# Patient Record
Sex: FEMALE | Race: WHITE | NOT HISPANIC OR LATINO | Employment: OTHER | URBAN - NONMETROPOLITAN AREA
[De-identification: names, ages, dates, MRNs, and addresses within clinical notes are randomized per-mention and may not be internally consistent; named-entity substitution may affect disease eponyms.]

---

## 2023-04-24 DIAGNOSIS — E78.2 MIXED HYPERLIPIDEMIA: Primary | ICD-10-CM

## 2023-04-24 RX ORDER — ATORVASTATIN CALCIUM 40 MG/1
TABLET, FILM COATED ORAL
Qty: 90 TABLET | Refills: 1 | Status: SHIPPED | OUTPATIENT
Start: 2023-04-24 | End: 2023-05-04 | Stop reason: SDUPTHER

## 2023-05-04 ENCOUNTER — OFFICE VISIT (OUTPATIENT)
Dept: PRIMARY CARE | Facility: CLINIC | Age: 68
End: 2023-05-04
Payer: COMMERCIAL

## 2023-05-04 VITALS
BODY MASS INDEX: 28.89 KG/M2 | DIASTOLIC BLOOD PRESSURE: 80 MMHG | HEART RATE: 52 BPM | OXYGEN SATURATION: 98 % | WEIGHT: 153 LBS | SYSTOLIC BLOOD PRESSURE: 137 MMHG | HEIGHT: 61 IN

## 2023-05-04 DIAGNOSIS — Z00.00 ROUTINE GENERAL MEDICAL EXAMINATION AT A HEALTH CARE FACILITY: Primary | ICD-10-CM

## 2023-05-04 DIAGNOSIS — Z13.220 LIPID SCREENING: ICD-10-CM

## 2023-05-04 DIAGNOSIS — Z78.0 MENOPAUSE: ICD-10-CM

## 2023-05-04 DIAGNOSIS — I73.00 RAYNAUD'S DISEASE WITHOUT GANGRENE: ICD-10-CM

## 2023-05-04 DIAGNOSIS — M17.11 ARTHRITIS OF KNEE, RIGHT: ICD-10-CM

## 2023-05-04 DIAGNOSIS — B07.0 PLANTAR WART: ICD-10-CM

## 2023-05-04 DIAGNOSIS — E78.2 MIXED HYPERLIPIDEMIA: ICD-10-CM

## 2023-05-04 DIAGNOSIS — I10 BENIGN ESSENTIAL HYPERTENSION: ICD-10-CM

## 2023-05-04 DIAGNOSIS — E03.9 HYPOTHYROIDISM, UNSPECIFIED TYPE: ICD-10-CM

## 2023-05-04 DIAGNOSIS — Z13.1 DIABETES MELLITUS SCREENING: ICD-10-CM

## 2023-05-04 DIAGNOSIS — Z13.0 SCREENING FOR DEFICIENCY ANEMIA: ICD-10-CM

## 2023-05-04 DIAGNOSIS — Z12.31 SCREENING MAMMOGRAM, ENCOUNTER FOR: ICD-10-CM

## 2023-05-04 PROBLEM — R09.89 BRUIT OF RIGHT CAROTID ARTERY: Status: ACTIVE | Noted: 2023-05-04

## 2023-05-04 PROBLEM — E78.5 HYPERLIPIDEMIA: Status: ACTIVE | Noted: 2023-05-04

## 2023-05-04 PROBLEM — E78.01 FAMILIAL HYPERCHOLESTEREMIA: Status: ACTIVE | Noted: 2023-05-04

## 2023-05-04 LAB
ALANINE AMINOTRANSFERASE (SGPT) (U/L) IN SER/PLAS: 28 U/L (ref 7–45)
ALBUMIN (G/DL) IN SER/PLAS: 4.4 G/DL (ref 3.4–5)
ALKALINE PHOSPHATASE (U/L) IN SER/PLAS: 77 U/L (ref 33–136)
ANION GAP IN SER/PLAS: 13 MMOL/L (ref 10–20)
ASPARTATE AMINOTRANSFERASE (SGOT) (U/L) IN SER/PLAS: 27 U/L (ref 9–39)
BILIRUBIN TOTAL (MG/DL) IN SER/PLAS: 0.9 MG/DL (ref 0–1.2)
CALCIUM (MG/DL) IN SER/PLAS: 9.6 MG/DL (ref 8.6–10.3)
CARBON DIOXIDE, TOTAL (MMOL/L) IN SER/PLAS: 29 MMOL/L (ref 21–32)
CHLORIDE (MMOL/L) IN SER/PLAS: 101 MMOL/L (ref 98–107)
CHOLESTEROL (MG/DL) IN SER/PLAS: 118 MG/DL (ref 0–199)
CHOLESTEROL IN HDL (MG/DL) IN SER/PLAS: 33 MG/DL
CHOLESTEROL/HDL RATIO: 3.6
CREATININE (MG/DL) IN SER/PLAS: 0.74 MG/DL (ref 0.5–1.05)
ERYTHROCYTE DISTRIBUTION WIDTH (RATIO) BY AUTOMATED COUNT: 11.9 % (ref 11.5–14.5)
ERYTHROCYTE MEAN CORPUSCULAR HEMOGLOBIN CONCENTRATION (G/DL) BY AUTOMATED: 32.7 G/DL (ref 32–36)
ERYTHROCYTE MEAN CORPUSCULAR VOLUME (FL) BY AUTOMATED COUNT: 94 FL (ref 80–100)
ERYTHROCYTES (10*6/UL) IN BLOOD BY AUTOMATED COUNT: 4.66 X10E12/L (ref 4–5.2)
GFR FEMALE: 87 ML/MIN/1.73M2
GLUCOSE (MG/DL) IN SER/PLAS: 82 MG/DL (ref 74–99)
HEMATOCRIT (%) IN BLOOD BY AUTOMATED COUNT: 43.7 % (ref 36–46)
HEMOGLOBIN (G/DL) IN BLOOD: 14.3 G/DL (ref 12–16)
LDL: 74 MG/DL (ref 0–99)
LEUKOCYTES (10*3/UL) IN BLOOD BY AUTOMATED COUNT: 6.4 X10E9/L (ref 4.4–11.3)
PLATELETS (10*3/UL) IN BLOOD AUTOMATED COUNT: 241 X10E9/L (ref 150–450)
POTASSIUM (MMOL/L) IN SER/PLAS: 4.6 MMOL/L (ref 3.5–5.3)
PROTEIN TOTAL: 6.7 G/DL (ref 6.4–8.2)
SODIUM (MMOL/L) IN SER/PLAS: 138 MMOL/L (ref 136–145)
TRIGLYCERIDE (MG/DL) IN SER/PLAS: 57 MG/DL (ref 0–149)
UREA NITROGEN (MG/DL) IN SER/PLAS: 17 MG/DL (ref 6–23)
VLDL: 11 MG/DL (ref 0–40)

## 2023-05-04 PROCEDURE — 85027 COMPLETE CBC AUTOMATED: CPT

## 2023-05-04 PROCEDURE — 86235 NUCLEAR ANTIGEN ANTIBODY: CPT

## 2023-05-04 PROCEDURE — 3075F SYST BP GE 130 - 139MM HG: CPT | Performed by: FAMILY MEDICINE

## 2023-05-04 PROCEDURE — 86225 DNA ANTIBODY NATIVE: CPT

## 2023-05-04 PROCEDURE — 99397 PER PM REEVAL EST PAT 65+ YR: CPT | Performed by: FAMILY MEDICINE

## 2023-05-04 PROCEDURE — 3079F DIAST BP 80-89 MM HG: CPT | Performed by: FAMILY MEDICINE

## 2023-05-04 PROCEDURE — 80061 LIPID PANEL: CPT

## 2023-05-04 PROCEDURE — 86039 ANTINUCLEAR ANTIBODIES (ANA): CPT

## 2023-05-04 PROCEDURE — 86038 ANTINUCLEAR ANTIBODIES: CPT

## 2023-05-04 PROCEDURE — 80053 COMPREHEN METABOLIC PANEL: CPT

## 2023-05-04 PROCEDURE — 1159F MED LIST DOCD IN RCRD: CPT | Performed by: FAMILY MEDICINE

## 2023-05-04 RX ORDER — ATORVASTATIN CALCIUM 40 MG/1
40 TABLET, FILM COATED ORAL DAILY
Qty: 90 TABLET | Refills: 3 | Status: SHIPPED | OUTPATIENT
Start: 2023-05-04 | End: 2024-01-15 | Stop reason: SDUPTHER

## 2023-05-04 RX ORDER — LEVOTHYROXINE SODIUM 100 UG/1
100 TABLET ORAL DAILY
Qty: 90 TABLET | Refills: 3 | Status: SHIPPED | OUTPATIENT
Start: 2023-05-04 | End: 2023-10-02 | Stop reason: SDUPTHER

## 2023-05-04 RX ORDER — ACETAMINOPHEN 500 MG
1 TABLET ORAL DAILY
COMMUNITY

## 2023-05-04 RX ORDER — LOSARTAN POTASSIUM 25 MG/1
25 TABLET ORAL DAILY
Qty: 90 TABLET | Refills: 2 | Status: SHIPPED | OUTPATIENT
Start: 2023-05-04 | End: 2023-10-02 | Stop reason: SDUPTHER

## 2023-05-04 RX ORDER — HYDROCHLOROTHIAZIDE 12.5 MG/1
1 TABLET ORAL DAILY PRN
COMMUNITY
Start: 2017-07-31 | End: 2023-05-04 | Stop reason: SDUPTHER

## 2023-05-04 RX ORDER — MELOXICAM 7.5 MG/1
7.5 TABLET ORAL DAILY
Qty: 90 TABLET | Refills: 3 | Status: SHIPPED | OUTPATIENT
Start: 2023-05-04 | End: 2024-01-17 | Stop reason: SDUPTHER

## 2023-05-04 RX ORDER — ASPIRIN 81 MG/1
1 TABLET ORAL DAILY
COMMUNITY
Start: 2016-10-27

## 2023-05-04 RX ORDER — HYDROCHLOROTHIAZIDE 12.5 MG/1
12.5 TABLET ORAL DAILY
Qty: 90 TABLET | Refills: 2 | Status: SHIPPED | OUTPATIENT
Start: 2023-05-04 | End: 2024-01-15 | Stop reason: SDUPTHER

## 2023-05-04 RX ORDER — MELOXICAM 15 MG/1
1 TABLET ORAL DAILY
COMMUNITY
Start: 2016-07-19 | End: 2023-05-04

## 2023-05-04 RX ORDER — IMIQUIMOD 12.5 MG/.25G
CREAM TOPICAL
Qty: 24 PACKET | Refills: 2 | Status: SHIPPED | OUTPATIENT
Start: 2023-05-04 | End: 2023-09-12 | Stop reason: ALTCHOICE

## 2023-05-04 RX ORDER — LOSARTAN POTASSIUM 25 MG/1
1 TABLET ORAL DAILY
COMMUNITY
Start: 2017-02-27 | End: 2023-05-04 | Stop reason: SDUPTHER

## 2023-05-04 ASSESSMENT — ENCOUNTER SYMPTOMS
COLOR CHANGE: 0
BLOOD IN STOOL: 0
HEMATURIA: 0
DIFFICULTY URINATING: 0
UNEXPECTED WEIGHT CHANGE: 0
SHORTNESS OF BREATH: 0
APPETITE CHANGE: 0
TROUBLE SWALLOWING: 0
CONSTIPATION: 0
CONFUSION: 0
PALPITATIONS: 0
JOINT SWELLING: 0
NERVOUS/ANXIOUS: 0
SEIZURES: 0
FEVER: 0
DIARRHEA: 0

## 2023-05-04 ASSESSMENT — PATIENT HEALTH QUESTIONNAIRE - PHQ9
SUM OF ALL RESPONSES TO PHQ9 QUESTIONS 1 AND 2: 0
SUM OF ALL RESPONSES TO PHQ9 QUESTIONS 1 AND 2: 0
1. LITTLE INTEREST OR PLEASURE IN DOING THINGS: NOT AT ALL
2. FEELING DOWN, DEPRESSED OR HOPELESS: NOT AT ALL
2. FEELING DOWN, DEPRESSED OR HOPELESS: NOT AT ALL
1. LITTLE INTEREST OR PLEASURE IN DOING THINGS: NOT AT ALL

## 2023-05-04 NOTE — PATIENT INSTRUCTIONS
1.stop losartan and start amlopidine to help w/ BP and raynauds  2. Using cream at night and wash off in AM. Every other night

## 2023-05-04 NOTE — PROGRESS NOTES
"Subjective   Patient ID: Verenice Sotelo is a 68 y.o. female who presents for Annual Exam (Yearly physical, refills ).  Having raynauds syndrome- in cold hands and now feet are changing to white/blue/red  Some pain   Hx of blue toe like changes    Having lesion on right foot            Review of Systems   Constitutional:  Negative for appetite change, fever and unexpected weight change.   HENT:  Negative for congestion and trouble swallowing.    Eyes:  Negative for visual disturbance.   Respiratory:  Negative for shortness of breath.    Cardiovascular:  Negative for chest pain, palpitations and leg swelling.   Gastrointestinal:  Negative for blood in stool, constipation and diarrhea.   Genitourinary:  Negative for difficulty urinating and hematuria.   Musculoskeletal:  Negative for gait problem and joint swelling.   Skin:  Negative for color change.   Allergic/Immunologic: Negative for immunocompromised state.   Neurological:  Negative for seizures and syncope.   Psychiatric/Behavioral:  Negative for confusion and suicidal ideas. The patient is not nervous/anxious.        Objective   /80   Pulse 52   Ht 1.549 m (5' 1\")   Wt 69.4 kg (153 lb)   SpO2 98%   BMI 28.91 kg/m²     Physical Exam  Constitutional:       General: She is not in acute distress.     Appearance: Normal appearance. She is not ill-appearing.   HENT:      Head: Normocephalic and atraumatic.      Right Ear: Tympanic membrane normal.      Left Ear: Tympanic membrane normal.      Nose: Nose normal.      Mouth/Throat:      Mouth: Mucous membranes are moist.   Eyes:      Pupils: Pupils are equal, round, and reactive to light.   Neck:      Vascular: Carotid bruit (right) present.   Cardiovascular:      Rate and Rhythm: Normal rate and regular rhythm.      Heart sounds: No murmur heard.     No friction rub. No gallop.   Pulmonary:      Effort: Pulmonary effort is normal.      Breath sounds: Normal breath sounds.   Abdominal:      General: " Abdomen is flat. There is no distension.      Palpations: Abdomen is soft.      Tenderness: There is no abdominal tenderness. There is no guarding.      Hernia: No hernia is present.   Musculoskeletal:         General: Normal range of motion.   Skin:     General: Skin is warm and dry.      Comments: Right foot plantar wart   Neurological:      General: No focal deficit present.      Mental Status: She is alert. Mental status is at baseline.      Cranial Nerves: No cranial nerve deficit.      Motor: No weakness.      Gait: Gait normal.   Psychiatric:         Mood and Affect: Mood normal.         Behavior: Behavior normal.         Thought Content: Thought content normal.         Judgment: Judgment normal.         Assessment/Plan   Problem List Items Addressed This Visit    None    Assessment:  #Hypothyroidism    #Hyperlipidemia  Lipitor 40mg     #Hypertension  Change losartan to amlodipine to help w/ raynauds in the fall  Continue hydrochlorothiazide    #raynauds:  Check lori  Norvasc in the fall     #Left knee pain: Secondary to arthritis with concern for a possible lateral meniscus tear    #Right carotid bruit: Negative carotid ultrasound    #Osteopenia    #Diverticulosis on colonoscopy     #obesity:  Lost significant wt from WW  We discussed 135lb is good BMI          #hypothyroid:        Levo 100mcg    Health Maintenance Reminder:  -Blood Work: today  -Hep C: complete  -Mammogram: ordered  -Colonoscopy: 10/31  -Pap: 5/23  -DEXA (FRAX): ordered  -Shringix : completed   -Pneumovax: complete  -Flu: Yearly

## 2023-05-05 LAB
ANA PATTERN: ABNORMAL
ANA TITER: ABNORMAL
ANTI-NUCLEAR ANTIBODY (ANA): POSITIVE

## 2023-05-09 LAB
ANTI-CENTROMERE: <0.2 AI
ANTI-CHROMATIN: <0.2 AI
ANTI-DNA (DS): 4 IU/ML
ANTI-JO-1 IGG: <0.2 AI
ANTI-RIBOSOMAL P: <0.2 AI
ANTI-RNP: 0.2 AI
ANTI-SCL-70: <0.2 AI
ANTI-SM/RNP: <0.2 AI
ANTI-SM: <0.2 AI
ANTI-SSA: <0.2 AI
ANTI-SSB: <0.2 AI

## 2023-06-15 DIAGNOSIS — M17.11 ARTHRITIS OF KNEE, RIGHT: Primary | ICD-10-CM

## 2023-06-15 RX ORDER — MELOXICAM 15 MG/1
TABLET ORAL
Qty: 90 TABLET | Refills: 3 | Status: SHIPPED | OUTPATIENT
Start: 2023-06-15 | End: 2023-09-12 | Stop reason: ALTCHOICE

## 2023-09-12 ENCOUNTER — OFFICE VISIT (OUTPATIENT)
Dept: PRIMARY CARE | Facility: CLINIC | Age: 68
End: 2023-09-12
Payer: MEDICARE

## 2023-09-12 VITALS
SYSTOLIC BLOOD PRESSURE: 138 MMHG | WEIGHT: 146 LBS | OXYGEN SATURATION: 99 % | BODY MASS INDEX: 26.87 KG/M2 | DIASTOLIC BLOOD PRESSURE: 60 MMHG | HEART RATE: 58 BPM | HEIGHT: 62 IN

## 2023-09-12 DIAGNOSIS — N81.4 CYSTOCELE WITH PROLAPSE: Primary | ICD-10-CM

## 2023-09-12 PROCEDURE — 3078F DIAST BP <80 MM HG: CPT

## 2023-09-12 PROCEDURE — 1160F RVW MEDS BY RX/DR IN RCRD: CPT

## 2023-09-12 PROCEDURE — 1159F MED LIST DOCD IN RCRD: CPT

## 2023-09-12 PROCEDURE — 1036F TOBACCO NON-USER: CPT

## 2023-09-12 PROCEDURE — 3075F SYST BP GE 130 - 139MM HG: CPT

## 2023-09-12 PROCEDURE — 99213 OFFICE O/P EST LOW 20 MIN: CPT

## 2023-09-12 NOTE — PROGRESS NOTES
Subjective   Patient ID: Verenice Sotelo is a 68 y.o. female who presents for Vaginal Prolapse (She feels something in her vaginal area, comes and goes no pain has more of a frequency to urinate and can't hold it ).  HPI  Verenice presents for a bulge that she has noticed in her vaginal opening.   She says the bulge is not there all the time but when she has to strain or to go to the bathroom she notices it.   This morning she did not have it but then she exercised and she noticed it again after exercising.   She also has sudden urges to urinate now. And then she goes and feels like she has to go again. Sometimes gets a lot of urine out sometimes not.  Lost weight since November.  Does not itch or hurt.  No abnormal vaginal discharge   No vaginal bleeding.  History of 3 vaginal deliveries.  Wakes up in the morning and the bulge is gone.  Once it protudes it stays all day.    Past Surgical History:   Procedure Laterality Date    CATARACT EXTRACTION  06/11/2015    Cataract Surgery    TUBAL LIGATION  10/18/2013    Tubal Ligation      Past Medical History:   Diagnosis Date    Acute maxillary sinusitis, unspecified 10/26/2016    Acute maxillary sinusitis    Encounter for immunization 01/09/2020    Need for shingles vaccine    Encounter for immunization 09/19/2016    Need for shingles vaccine    Encounter for immunization 09/19/2016    Needs flu shot    Encounter for screening for other viral diseases 12/01/2017    Need for hepatitis C screening test    Personal history of other specified conditions 04/23/2015    History of diarrhea     Social History     Tobacco Use    Smoking status: Never    Smokeless tobacco: Never   Substance Use Topics    Alcohol use: Never    Drug use: Never        Review of Systems  10 point review of systems performed and is negative except as noted in the HPI.      Current Outpatient Medications:     aspirin 81 mg EC tablet, Take 1 tablet (81 mg) by mouth once daily., Disp: , Rfl:      "atorvastatin (Lipitor) 40 mg tablet, Take 1 tablet (40 mg) by mouth once daily., Disp: 90 tablet, Rfl: 3    calcium carbonate-vitamin D3 600 mg-20 mcg (800 unit) tablet, Take 1 tablet by mouth once daily., Disp: , Rfl:     hydroCHLOROthiazide (HYDRODiuril) 12.5 mg tablet, Take 1 tablet (12.5 mg) by mouth once daily., Disp: 90 tablet, Rfl: 2    levothyroxine (Synthroid, Levoxyl) 100 mcg tablet, Take 1 tablet (100 mcg) by mouth once daily., Disp: 90 tablet, Rfl: 3    losartan (Cozaar) 25 mg tablet, Take 1 tablet (25 mg) by mouth once daily., Disp: 90 tablet, Rfl: 2    meloxicam (Mobic) 7.5 mg tablet, Take 1 tablet (7.5 mg) by mouth once daily., Disp: 90 tablet, Rfl: 3     Objective   /60   Pulse 58   Ht 1.575 m (5' 2\")   Wt 66.2 kg (146 lb)   SpO2 99%   BMI 26.70 kg/m²     Physical Exam  Vitals reviewed. Chaperone present: declined - see form.   Constitutional:       General: She is not in acute distress.     Appearance: Normal appearance. She is obese. She is not ill-appearing.   HENT:      Head: Normocephalic and atraumatic.   Genitourinary:     Exam position: Lithotomy position.      Labia:         Right: No rash or tenderness.         Left: No rash or tenderness.       Urethra: Prolapse present.      Vagina: No foreign body. Prolapsed vaginal walls (anterior bulge seen through in the introitus) present. No vaginal discharge, erythema, tenderness or bleeding.      Cervix: Normal.      Rectum: Normal.   Musculoskeletal:         General: Normal range of motion.   Skin:     General: Skin is warm and dry.   Neurological:      Mental Status: She is alert and oriented to person, place, and time.       Assessment/Plan   Problem List Items Addressed This Visit    None  Visit Diagnoses       Cystocele with prolapse    -  Primary    Relevant Orders    Referral to Obstetrics / Gynecology        Referral to Dr. Johnson for further evaluation of potential cystocele   Follow up as needed    Discussed at visit any " disease processes that were of concern as well as the risks, benefits and instructions on any new medication provided. Patient (and/or caretaker of patient if present) stated all questions were answered, and they voiced understanding of instructions.

## 2023-10-02 DIAGNOSIS — I10 BENIGN ESSENTIAL HYPERTENSION: ICD-10-CM

## 2023-10-02 DIAGNOSIS — E03.9 HYPOTHYROIDISM, UNSPECIFIED TYPE: ICD-10-CM

## 2023-10-02 RX ORDER — LOSARTAN POTASSIUM 25 MG/1
25 TABLET ORAL DAILY
Qty: 90 TABLET | Refills: 2 | Status: SHIPPED | OUTPATIENT
Start: 2023-10-02 | End: 2024-01-18 | Stop reason: SDUPTHER

## 2023-10-02 RX ORDER — LEVOTHYROXINE SODIUM 100 UG/1
100 TABLET ORAL DAILY
Qty: 90 TABLET | Refills: 3 | Status: SHIPPED | OUTPATIENT
Start: 2023-10-02 | End: 2024-01-15 | Stop reason: SDUPTHER

## 2023-11-13 ENCOUNTER — OFFICE VISIT (OUTPATIENT)
Dept: UROLOGY | Facility: CLINIC | Age: 68
End: 2023-11-13
Payer: MEDICARE

## 2023-11-13 DIAGNOSIS — N81.11 MIDLINE CYSTOCELE: Primary | ICD-10-CM

## 2023-11-13 LAB
POC APPEARANCE, URINE: CLEAR
POC BILIRUBIN, URINE: NEGATIVE
POC BLOOD, URINE: NEGATIVE
POC COLOR, URINE: YELLOW
POC GLUCOSE, URINE: NEGATIVE MG/DL
POC KETONES, URINE: NEGATIVE MG/DL
POC LEUKOCYTES, URINE: ABNORMAL
POC NITRITE,URINE: NEGATIVE
POC PH, URINE: 7 PH
POC PROTEIN, URINE: NEGATIVE MG/DL
POC SPECIFIC GRAVITY, URINE: 1.01
POC UROBILINOGEN, URINE: 0.2 EU/DL

## 2023-11-13 PROCEDURE — 3078F DIAST BP <80 MM HG: CPT | Performed by: STUDENT IN AN ORGANIZED HEALTH CARE EDUCATION/TRAINING PROGRAM

## 2023-11-13 PROCEDURE — 3075F SYST BP GE 130 - 139MM HG: CPT | Performed by: STUDENT IN AN ORGANIZED HEALTH CARE EDUCATION/TRAINING PROGRAM

## 2023-11-13 PROCEDURE — 1159F MED LIST DOCD IN RCRD: CPT | Performed by: STUDENT IN AN ORGANIZED HEALTH CARE EDUCATION/TRAINING PROGRAM

## 2023-11-13 PROCEDURE — 57160 INSERT PESSARY/OTHER DEVICE: CPT | Performed by: STUDENT IN AN ORGANIZED HEALTH CARE EDUCATION/TRAINING PROGRAM

## 2023-11-13 PROCEDURE — 1160F RVW MEDS BY RX/DR IN RCRD: CPT | Performed by: STUDENT IN AN ORGANIZED HEALTH CARE EDUCATION/TRAINING PROGRAM

## 2023-11-13 PROCEDURE — A4562 PESSARY, NON RUBBER,ANY TYPE: HCPCS | Performed by: STUDENT IN AN ORGANIZED HEALTH CARE EDUCATION/TRAINING PROGRAM

## 2023-11-13 PROCEDURE — 99205 OFFICE O/P NEW HI 60 MIN: CPT | Performed by: STUDENT IN AN ORGANIZED HEALTH CARE EDUCATION/TRAINING PROGRAM

## 2023-11-13 PROCEDURE — 1036F TOBACCO NON-USER: CPT | Performed by: STUDENT IN AN ORGANIZED HEALTH CARE EDUCATION/TRAINING PROGRAM

## 2023-11-13 RX ORDER — CELECOXIB 50 MG/1
400 CAPSULE ORAL ONCE
Status: CANCELLED | OUTPATIENT
Start: 2023-11-13 | End: 2023-11-13

## 2023-11-13 RX ORDER — GABAPENTIN 300 MG/1
600 CAPSULE ORAL ONCE
Status: CANCELLED | OUTPATIENT
Start: 2023-11-13 | End: 2023-11-13

## 2023-11-13 RX ORDER — ACETAMINOPHEN 325 MG/1
975 TABLET ORAL ONCE
Status: CANCELLED | OUTPATIENT
Start: 2023-11-13 | End: 2023-11-13

## 2023-11-13 NOTE — LETTER
November 13, 2023     Syeda King PA-C  [address] [facility] [id number]    Patient: Verenice Sotelo   YOB: 1955   Date of Visit: 11/13/2023       Dear Dr. Syeda King PA-C:    Thank you for referring Verenice Sotelo to me for evaluation. Below are my notes for this consultation.  If you have questions, please do not hesitate to call me. I look forward to following your patient along with you.       Sincerely,     Ruben Johnson MD      CC: No Recipients  ______________________________________________________________________________________        PCP  Fermin Bunch DO         CHIEF COMPLAINT: Pelvic organ prolapse         HISTORY OF PRESENT ILLNESS:  This is a  68 y.o. y.o. who presents with sensation of a vaginal bulge and discomfort.  Patient feels the bulge especially when she is most active and at the end of the day.  Also reports worsening urinary urgency frequency and nocturia.  She has had stress incontinence for a long time.  She has had 3 vaginal births.  Her bowel movements are relatively normal.  Previous surgery include a laparoscopic tubal ligation.  She is otherwise in generally good health         Past Medical History  She has a past medical history of Acute maxillary sinusitis, unspecified (10/26/2016), Encounter for immunization (01/09/2020), Encounter for immunization (09/19/2016), Encounter for immunization (09/19/2016), Encounter for screening for other viral diseases (12/01/2017), and Personal history of other specified conditions (04/23/2015).    Surgical History  She has a past surgical history that includes Tubal ligation (10/18/2013) and Cataract extraction (06/11/2015).     Social History  She reports that she has never smoked. She has never used smokeless tobacco. She reports that she does not drink alcohol and does not use drugs.    Family History  Family History   Problem Relation Name Age of Onset   • Cervical cancer Mother     • Prostate cancer Father    "  • Aneurysm Sister          Ruptured Cerebral   • Hyperlipidemia Brother          Allergies  Lisinopril and Sulfa (sulfonamide antibiotics)        A comprehensive 10+ review of systems was negative except for: see hpi                    PHYSICAL EXAMINATION:  BP Readings from Last 3 Encounters:   09/12/23 138/60   05/04/23 137/80   09/02/22 124/60      Wt Readings from Last 3 Encounters:   09/12/23 66.2 kg (146 lb)   05/04/23 69.4 kg (153 lb)   09/02/22 84 kg (185 lb 4 oz)      BMI: Estimated body mass index is 26.7 kg/m² as calculated from the following:    Height as of 9/12/23: 1.575 m (5' 2\").    Weight as of 9/12/23: 66.2 kg (146 lb).  BSA: Estimated body surface area is 1.7 meters squared as calculated from the following:    Height as of 9/12/23: 1.575 m (5' 2\").    Weight as of 9/12/23: 66.2 kg (146 lb).  HEENT: Normocephalic, atraumatic, PER EOMI, nonicteric, trachea normal, thyroid normal, oropharynx normal.  CARDIAC: regular rate & rhythm, S1 & S2 normal.  No heaves, thrills, gallops or murmurs.  LUNGS: Clear to auscultation, no spinal or CV tenderness.  EXTREMITIES: No evidence of cyanosis, clubbing or edema.      Pelvic:  Genitourinary:  normal external genitalia, Bartholin's glands negative, [place]'s glands negative  Urethra   normal meatus, non-tender, no periurethral mass  Vaginal mucosa  normal  Cervix  normal  Uterus  normal size, nontender  Adnexae  negative nontender, no masses  Atrophy positive    CST negative  Pelvic floor muscle contraction  5/5    POP-Q (in supine position):        Aa 1     Ba 1     C -3              gh 4     pb 3     tvl 8              Ap -3     Bp -3     D -3    Rectal: no hemorrhoids, fissures or masses    PVR (by Ultrasound): 18           IMPRESSION AND PLAN:  Verenice Sotelo is a 68 y.o. who presents with stage II uterovaginal prolapse and mixed incontinence    POP:  I discussed treatment options including pessary and surgery, with regard to surgery discussed " hysterectomy vs. Hysteropexy: major benefit of hysteropexy is shorter OR time and less EBL and outcomes equivalent to hysterectomy + repair at 3 years, but no data beyond that time point. Also discussed SCP vs native tissue repair; for SCP the failure rate is 5-10%, but associated with mesh complications including erosion <1% and SBO <0.5% vs native tissue repair which is associated with 20-30% failure rate, but no long term risk of complications and only~15% requiring additional treatment.    She was fitted with a #3 ring with support today, she will also be scheduled for a laparoscopic supracervical hysterectomy and sacrocolpopexy, she was given handouts about all options and will let me know what she wants to do ultimately    She is interested in hearing more about the premier trial    We will follow-up after UDS      MADIE    -discussed mechanism of UUI and FRANCIS, and treatment options for both including PFT, pessary, sling for FRANCIS and PFT, pharmacotherapy and third-line therapy for OAB    Follow-up after UDS    11/13/2023

## 2023-11-13 NOTE — PROGRESS NOTES
PCP  Fermin Bunch DO         CHIEF COMPLAINT: Pelvic organ prolapse         HISTORY OF PRESENT ILLNESS:  This is a  68 y.o. y.o. who presents with sensation of a vaginal bulge and discomfort.  Patient feels the bulge especially when she is most active and at the end of the day.  Also reports worsening urinary urgency frequency and nocturia.  She has had stress incontinence for a long time.  She has had 3 vaginal births.  Her bowel movements are relatively normal.  Previous surgery include a laparoscopic tubal ligation.  She is otherwise in generally good health         Past Medical History  She has a past medical history of Acute maxillary sinusitis, unspecified (10/26/2016), Encounter for immunization (01/09/2020), Encounter for immunization (09/19/2016), Encounter for immunization (09/19/2016), Encounter for screening for other viral diseases (12/01/2017), and Personal history of other specified conditions (04/23/2015).    Surgical History  She has a past surgical history that includes Tubal ligation (10/18/2013) and Cataract extraction (06/11/2015).     Social History  She reports that she has never smoked. She has never used smokeless tobacco. She reports that she does not drink alcohol and does not use drugs.    Family History  Family History   Problem Relation Name Age of Onset    Cervical cancer Mother      Prostate cancer Father      Aneurysm Sister          Ruptured Cerebral    Hyperlipidemia Brother          Allergies  Lisinopril and Sulfa (sulfonamide antibiotics)        A comprehensive 10+ review of systems was negative except for: see hpi                    PHYSICAL EXAMINATION:  BP Readings from Last 3 Encounters:   09/12/23 138/60   05/04/23 137/80   09/02/22 124/60      Wt Readings from Last 3 Encounters:   09/12/23 66.2 kg (146 lb)   05/04/23 69.4 kg (153 lb)   09/02/22 84 kg (185 lb 4 oz)      BMI: Estimated body mass index is 26.7 kg/m² as calculated from the following:    Height as of  "9/12/23: 1.575 m (5' 2\").    Weight as of 9/12/23: 66.2 kg (146 lb).  BSA: Estimated body surface area is 1.7 meters squared as calculated from the following:    Height as of 9/12/23: 1.575 m (5' 2\").    Weight as of 9/12/23: 66.2 kg (146 lb).  HEENT: Normocephalic, atraumatic, PER EOMI, nonicteric, trachea normal, thyroid normal, oropharynx normal.  CARDIAC: regular rate & rhythm, S1 & S2 normal.  No heaves, thrills, gallops or murmurs.  LUNGS: Clear to auscultation, no spinal or CV tenderness.  EXTREMITIES: No evidence of cyanosis, clubbing or edema.      Pelvic:  Genitourinary:  normal external genitalia, Bartholin's glands negative, [place]'s glands negative  Urethra   normal meatus, non-tender, no periurethral mass  Vaginal mucosa  normal  Cervix  normal  Uterus  normal size, nontender  Adnexae  negative nontender, no masses  Atrophy positive    CST negative  Pelvic floor muscle contraction  5/5    POP-Q (in supine position):        Aa 1     Ba 1     C -3              gh 4     pb 3     tvl 8              Ap -3     Bp -3     D -3    Rectal: no hemorrhoids, fissures or masses    PVR (by Ultrasound): 18           IMPRESSION AND PLAN:  Verenice Sotelo is a 68 y.o. who presents with stage II uterovaginal prolapse and mixed incontinence    POP:  I discussed treatment options including pessary and surgery, with regard to surgery discussed hysterectomy vs. Hysteropexy: major benefit of hysteropexy is shorter OR time and less EBL and outcomes equivalent to hysterectomy + repair at 3 years, but no data beyond that time point. Also discussed SCP vs native tissue repair; for SCP the failure rate is 5-10%, but associated with mesh complications including erosion <1% and SBO <0.5% vs native tissue repair which is associated with 20-30% failure rate, but no long term risk of complications and only~15% requiring additional treatment.    She was fitted with a #3 ring with support today, she will also be scheduled for a " laparoscopic supracervical hysterectomy and sacrocolpopexy, she was given handouts about all options and will let me know what she wants to do ultimately    She is interested in hearing more about the premier trial    We will follow-up after UDS      MADIE    -discussed mechanism of UUI and FRANCIS, and treatment options for both including PFT, pessary, sling for FRANCIS and PFT, pharmacotherapy and third-line therapy for OAB    Follow-up after UDS    11/13/2023

## 2023-11-16 ENCOUNTER — APPOINTMENT (OUTPATIENT)
Dept: UROLOGY | Facility: CLINIC | Age: 68
End: 2023-11-16
Payer: MEDICARE

## 2023-11-28 ENCOUNTER — HOSPITAL ENCOUNTER (OUTPATIENT)
Facility: HOSPITAL | Age: 68
Setting detail: OUTPATIENT SURGERY
End: 2023-11-28
Attending: STUDENT IN AN ORGANIZED HEALTH CARE EDUCATION/TRAINING PROGRAM | Admitting: STUDENT IN AN ORGANIZED HEALTH CARE EDUCATION/TRAINING PROGRAM
Payer: COMMERCIAL

## 2023-11-28 PROBLEM — N81.11 MIDLINE CYSTOCELE: Status: ACTIVE | Noted: 2023-11-13

## 2023-12-04 ENCOUNTER — APPOINTMENT (OUTPATIENT)
Dept: UROLOGY | Facility: CLINIC | Age: 68
End: 2023-12-04
Payer: MEDICARE

## 2023-12-07 ENCOUNTER — APPOINTMENT (OUTPATIENT)
Dept: UROLOGY | Facility: CLINIC | Age: 68
End: 2023-12-07
Payer: MEDICARE

## 2024-01-10 ENCOUNTER — APPOINTMENT (OUTPATIENT)
Dept: UROLOGY | Facility: CLINIC | Age: 69
End: 2024-01-10
Payer: COMMERCIAL

## 2024-01-15 DIAGNOSIS — E03.9 HYPOTHYROIDISM, UNSPECIFIED TYPE: ICD-10-CM

## 2024-01-15 DIAGNOSIS — E78.2 MIXED HYPERLIPIDEMIA: ICD-10-CM

## 2024-01-15 DIAGNOSIS — I10 BENIGN ESSENTIAL HYPERTENSION: ICD-10-CM

## 2024-01-15 RX ORDER — LOSARTAN POTASSIUM 25 MG/1
25 TABLET ORAL DAILY
Qty: 30 TABLET | Refills: 0 | Status: CANCELLED | OUTPATIENT
Start: 2024-01-15

## 2024-01-16 ENCOUNTER — APPOINTMENT (OUTPATIENT)
Dept: UROLOGY | Facility: CLINIC | Age: 69
End: 2024-01-16
Payer: COMMERCIAL

## 2024-01-17 DIAGNOSIS — M17.11 ARTHRITIS OF KNEE, RIGHT: ICD-10-CM

## 2024-01-17 RX ORDER — MELOXICAM 7.5 MG/1
7.5 TABLET ORAL DAILY
Qty: 90 TABLET | Refills: 3 | Status: SHIPPED | OUTPATIENT
Start: 2024-01-17 | End: 2024-04-15 | Stop reason: SDUPTHER

## 2024-01-18 DIAGNOSIS — E78.2 MIXED HYPERLIPIDEMIA: ICD-10-CM

## 2024-01-18 DIAGNOSIS — E03.9 HYPOTHYROIDISM, UNSPECIFIED TYPE: ICD-10-CM

## 2024-01-18 DIAGNOSIS — I10 BENIGN ESSENTIAL HYPERTENSION: ICD-10-CM

## 2024-01-18 RX ORDER — ATORVASTATIN CALCIUM 40 MG/1
40 TABLET, FILM COATED ORAL DAILY
Qty: 90 TABLET | Refills: 3 | Status: SHIPPED | OUTPATIENT
Start: 2024-01-18 | End: 2024-04-15 | Stop reason: WASHOUT

## 2024-01-18 RX ORDER — HYDROCHLOROTHIAZIDE 12.5 MG/1
12.5 TABLET ORAL DAILY
Qty: 30 TABLET | Refills: 0 | Status: SHIPPED | OUTPATIENT
Start: 2024-01-18 | End: 2024-04-15 | Stop reason: SDUPTHER

## 2024-01-18 RX ORDER — LEVOTHYROXINE SODIUM 100 UG/1
100 TABLET ORAL DAILY
Qty: 90 TABLET | Refills: 3 | Status: SHIPPED | OUTPATIENT
Start: 2024-01-18 | End: 2024-04-15 | Stop reason: WASHOUT

## 2024-01-18 RX ORDER — LOSARTAN POTASSIUM 25 MG/1
25 TABLET ORAL DAILY
Qty: 90 TABLET | Refills: 2 | Status: SHIPPED | OUTPATIENT
Start: 2024-01-18 | End: 2024-04-15 | Stop reason: WASHOUT

## 2024-01-18 RX ORDER — LEVOTHYROXINE SODIUM 100 UG/1
100 TABLET ORAL DAILY
Qty: 90 TABLET | Refills: 1 | Status: SHIPPED | OUTPATIENT
Start: 2024-01-18 | End: 2024-04-16 | Stop reason: SDUPTHER

## 2024-01-18 RX ORDER — ATORVASTATIN CALCIUM 40 MG/1
40 TABLET, FILM COATED ORAL DAILY
Qty: 30 TABLET | Refills: 0 | Status: SHIPPED | OUTPATIENT
Start: 2024-01-18 | End: 2024-02-06 | Stop reason: SDUPTHER

## 2024-01-18 RX ORDER — HYDROCHLOROTHIAZIDE 12.5 MG/1
12.5 TABLET ORAL DAILY
Qty: 90 TABLET | Refills: 2 | Status: SHIPPED | OUTPATIENT
Start: 2024-01-18 | End: 2024-04-15 | Stop reason: WASHOUT

## 2024-02-06 DIAGNOSIS — E78.2 MIXED HYPERLIPIDEMIA: ICD-10-CM

## 2024-02-06 RX ORDER — ATORVASTATIN CALCIUM 40 MG/1
40 TABLET, FILM COATED ORAL DAILY
Qty: 30 TABLET | Refills: 0 | Status: SHIPPED | OUTPATIENT
Start: 2024-02-06 | End: 2024-04-15 | Stop reason: SDUPTHER

## 2024-03-25 ENCOUNTER — OFFICE VISIT (OUTPATIENT)
Dept: UROLOGY | Facility: CLINIC | Age: 69
End: 2024-03-25
Payer: COMMERCIAL

## 2024-03-25 DIAGNOSIS — N39.3 SUI (STRESS URINARY INCONTINENCE, FEMALE): Primary | ICD-10-CM

## 2024-03-25 DIAGNOSIS — N32.81 OAB (OVERACTIVE BLADDER): ICD-10-CM

## 2024-03-25 DIAGNOSIS — N81.9 FEMALE GENITAL PROLAPSE, UNSPECIFIED TYPE: ICD-10-CM

## 2024-03-25 PROCEDURE — 99214 OFFICE O/P EST MOD 30 MIN: CPT | Performed by: STUDENT IN AN ORGANIZED HEALTH CARE EDUCATION/TRAINING PROGRAM

## 2024-03-25 PROCEDURE — 1036F TOBACCO NON-USER: CPT | Performed by: STUDENT IN AN ORGANIZED HEALTH CARE EDUCATION/TRAINING PROGRAM

## 2024-03-25 NOTE — PROGRESS NOTES
"HISTORY OF PRESENT ILLNESS:  Verenice Sotelo is a 69 y.o. female who presents today for a follow up visit. She reports that she feels her bulge is still coming out, even with the pessary device. She would like to reschedule surgery.          Past Medical History  She has a past medical history of Acute maxillary sinusitis, unspecified (10/26/2016), Encounter for immunization (01/09/2020), Encounter for immunization (09/19/2016), Encounter for immunization (09/19/2016), Encounter for screening for other viral diseases (12/01/2017), and Personal history of other specified conditions (04/23/2015).    Surgical History  She has a past surgical history that includes Tubal ligation (10/18/2013) and Cataract extraction (06/11/2015).     Social History  She reports that she has never smoked. She has never used smokeless tobacco. She reports that she does not drink alcohol and does not use drugs.    Family History  Family History   Problem Relation Name Age of Onset    Cervical cancer Mother      Prostate cancer Father      Aneurysm Sister          Ruptured Cerebral    Hyperlipidemia Brother          Allergies  Lisinopril and Sulfa (sulfonamide antibiotics)      A comprehensive 10+ review of systems was negative except for: see hpi                          PHYSICAL EXAMINATION:  BP Readings from Last 3 Encounters:   09/12/23 138/60   05/04/23 137/80   09/02/22 124/60      Wt Readings from Last 3 Encounters:   09/12/23 66.2 kg (146 lb)   05/04/23 69.4 kg (153 lb)   09/02/22 84 kg (185 lb 4 oz)      BMI: Estimated body mass index is 26.7 kg/m² as calculated from the following:    Height as of 9/12/23: 1.575 m (5' 2\").    Weight as of 9/12/23: 66.2 kg (146 lb).  BSA: Estimated body surface area is 1.7 meters squared as calculated from the following:    Height as of 9/12/23: 1.575 m (5' 2\").    Weight as of 9/12/23: 66.2 kg (146 lb).  HEENT: Normocephalic, atraumatic, PER EOMI, nonicteric, trachea normal, thyroid normal, " oropharynx normal.  CARDIAC: regular rate & rhythm, S1 & S2 normal.  No heaves, thrills, gallops or murmurs.  LUNGS: Clear to auscultation, no spinal or CV tenderness.  EXTREMITIES: No evidence of cyanosis, clubbing or edema.        Aa 1     Ba 1     C -3              gh 4     pb 3     tvl 8              Ap -3     Bp -3     D -3    Replaced pessary            Assessment:  Verenice Sotelo is a 69 y.o. who presents with stage II uterovaginal prolapse and mixed incontinence     POP:  Reschedule Lap scp/nayeli       She was fitted with a #5 gelhorn, #3 caused discomfort and was removed in office 3/25/24      MADIE   -discussed mechanism of UUI and FRANCIS, and treatment options for both including PFT, pessary, sling for FRANCIS and PFT, pharmacotherapy and third-line therapy for OAB    Plan on UDS       Ruben Johnson MD    Scribe Attestation  By signing my name below, I, Yanabranden Chew, Scribe   attest that this documentation has been prepared under the direction and in the presence of Ruben Johnson MD.

## 2024-03-27 PROBLEM — N32.81 OAB (OVERACTIVE BLADDER): Status: ACTIVE | Noted: 2024-03-25

## 2024-03-27 PROBLEM — N81.9 FEMALE GENITAL PROLAPSE: Status: ACTIVE | Noted: 2024-03-25

## 2024-03-27 PROBLEM — N39.3 SUI (STRESS URINARY INCONTINENCE, FEMALE): Status: ACTIVE | Noted: 2024-03-25

## 2024-03-27 RX ORDER — CEFAZOLIN SODIUM 2 G/100ML
2 INJECTION, SOLUTION INTRAVENOUS ONCE
OUTPATIENT
Start: 2024-03-27 | End: 2024-03-27

## 2024-03-27 RX ORDER — PHENAZOPYRIDINE HYDROCHLORIDE 200 MG/1
200 TABLET, FILM COATED ORAL ONCE
OUTPATIENT
Start: 2024-03-27 | End: 2024-03-27

## 2024-03-27 RX ORDER — CELECOXIB 50 MG/1
200 CAPSULE ORAL ONCE
OUTPATIENT
Start: 2024-03-27 | End: 2024-03-27

## 2024-03-27 RX ORDER — GABAPENTIN 300 MG/1
300 CAPSULE ORAL ONCE
OUTPATIENT
Start: 2024-03-27 | End: 2024-03-27

## 2024-03-27 RX ORDER — ACETAMINOPHEN 325 MG/1
975 TABLET ORAL ONCE
OUTPATIENT
Start: 2024-03-27 | End: 2024-03-27

## 2024-04-15 ENCOUNTER — OFFICE VISIT (OUTPATIENT)
Dept: PRIMARY CARE | Facility: CLINIC | Age: 69
End: 2024-04-15
Payer: COMMERCIAL

## 2024-04-15 VITALS
HEIGHT: 61 IN | OXYGEN SATURATION: 99 % | SYSTOLIC BLOOD PRESSURE: 120 MMHG | WEIGHT: 155.6 LBS | DIASTOLIC BLOOD PRESSURE: 78 MMHG | BODY MASS INDEX: 29.38 KG/M2 | HEART RATE: 56 BPM

## 2024-04-15 DIAGNOSIS — Z13.1 DIABETES MELLITUS SCREENING: ICD-10-CM

## 2024-04-15 DIAGNOSIS — Z00.00 MEDICARE ANNUAL WELLNESS VISIT, SUBSEQUENT: Primary | ICD-10-CM

## 2024-04-15 DIAGNOSIS — E78.2 MIXED HYPERLIPIDEMIA: ICD-10-CM

## 2024-04-15 DIAGNOSIS — N81.11 MIDLINE CYSTOCELE: ICD-10-CM

## 2024-04-15 DIAGNOSIS — M85.80 OSTEOPENIA, UNSPECIFIED LOCATION: ICD-10-CM

## 2024-04-15 DIAGNOSIS — E03.9 HYPOTHYROIDISM, UNSPECIFIED TYPE: ICD-10-CM

## 2024-04-15 DIAGNOSIS — Z13.0 SCREENING FOR DEFICIENCY ANEMIA: ICD-10-CM

## 2024-04-15 DIAGNOSIS — Z13.220 LIPID SCREENING: ICD-10-CM

## 2024-04-15 DIAGNOSIS — I73.00 RAYNAUD'S DISEASE WITHOUT GANGRENE: ICD-10-CM

## 2024-04-15 DIAGNOSIS — M17.11 ARTHRITIS OF KNEE, RIGHT: ICD-10-CM

## 2024-04-15 DIAGNOSIS — Z12.31 SCREENING MAMMOGRAM, ENCOUNTER FOR: ICD-10-CM

## 2024-04-15 DIAGNOSIS — I10 BENIGN ESSENTIAL HYPERTENSION: ICD-10-CM

## 2024-04-15 LAB
25(OH)D3 SERPL-MCNC: 58 NG/ML (ref 30–100)
ALBUMIN SERPL BCP-MCNC: 4.6 G/DL (ref 3.4–5)
ALP SERPL-CCNC: 71 U/L (ref 33–136)
ALT SERPL W P-5'-P-CCNC: 23 U/L (ref 7–45)
ANION GAP SERPL CALC-SCNC: 14 MMOL/L (ref 10–20)
AST SERPL W P-5'-P-CCNC: 21 U/L (ref 9–39)
BILIRUB SERPL-MCNC: 0.8 MG/DL (ref 0–1.2)
BUN SERPL-MCNC: 21 MG/DL (ref 6–23)
CALCIUM SERPL-MCNC: 9.5 MG/DL (ref 8.6–10.3)
CHLORIDE SERPL-SCNC: 101 MMOL/L (ref 98–107)
CHOLEST SERPL-MCNC: 132 MG/DL (ref 0–199)
CHOLESTEROL/HDL RATIO: 3.1
CO2 SERPL-SCNC: 32 MMOL/L (ref 21–32)
CREAT SERPL-MCNC: 0.76 MG/DL (ref 0.5–1.05)
EGFRCR SERPLBLD CKD-EPI 2021: 84 ML/MIN/1.73M*2
ERYTHROCYTE [DISTWIDTH] IN BLOOD BY AUTOMATED COUNT: 11.9 % (ref 11.5–14.5)
GLUCOSE SERPL-MCNC: 78 MG/DL (ref 74–99)
HCT VFR BLD AUTO: 41.5 % (ref 36–46)
HDLC SERPL-MCNC: 43 MG/DL
HGB BLD-MCNC: 13.5 G/DL (ref 12–16)
LDLC SERPL CALC-MCNC: 77 MG/DL
MCH RBC QN AUTO: 31 PG (ref 26–34)
MCHC RBC AUTO-ENTMCNC: 32.5 G/DL (ref 32–36)
MCV RBC AUTO: 95 FL (ref 80–100)
NON HDL CHOLESTEROL: 89 MG/DL (ref 0–149)
NRBC BLD-RTO: 0 /100 WBCS (ref 0–0)
PLATELET # BLD AUTO: 231 X10*3/UL (ref 150–450)
POTASSIUM SERPL-SCNC: 4.5 MMOL/L (ref 3.5–5.3)
PROT SERPL-MCNC: 7.1 G/DL (ref 6.4–8.2)
RBC # BLD AUTO: 4.36 X10*6/UL (ref 4–5.2)
SODIUM SERPL-SCNC: 142 MMOL/L (ref 136–145)
TRIGL SERPL-MCNC: 61 MG/DL (ref 0–149)
TSH SERPL-ACNC: 0.45 MIU/L (ref 0.44–3.98)
VLDL: 12 MG/DL (ref 0–40)
WBC # BLD AUTO: 7.6 X10*3/UL (ref 4.4–11.3)

## 2024-04-15 PROCEDURE — 82306 VITAMIN D 25 HYDROXY: CPT

## 2024-04-15 PROCEDURE — 1170F FXNL STATUS ASSESSED: CPT | Performed by: FAMILY MEDICINE

## 2024-04-15 PROCEDURE — 84443 ASSAY THYROID STIM HORMONE: CPT

## 2024-04-15 PROCEDURE — 80061 LIPID PANEL: CPT

## 2024-04-15 PROCEDURE — 36415 COLL VENOUS BLD VENIPUNCTURE: CPT

## 2024-04-15 PROCEDURE — G0439 PPPS, SUBSEQ VISIT: HCPCS | Performed by: FAMILY MEDICINE

## 2024-04-15 PROCEDURE — 3078F DIAST BP <80 MM HG: CPT | Performed by: FAMILY MEDICINE

## 2024-04-15 PROCEDURE — 85027 COMPLETE CBC AUTOMATED: CPT

## 2024-04-15 PROCEDURE — 3074F SYST BP LT 130 MM HG: CPT | Performed by: FAMILY MEDICINE

## 2024-04-15 PROCEDURE — 80053 COMPREHEN METABOLIC PANEL: CPT

## 2024-04-15 PROCEDURE — 1036F TOBACCO NON-USER: CPT | Performed by: FAMILY MEDICINE

## 2024-04-15 RX ORDER — MELOXICAM 7.5 MG/1
7.5 TABLET ORAL DAILY
Qty: 90 TABLET | Refills: 3 | Status: SHIPPED | OUTPATIENT
Start: 2024-04-15 | End: 2025-04-15

## 2024-04-15 RX ORDER — HYDROCHLOROTHIAZIDE 12.5 MG/1
12.5 TABLET ORAL DAILY
Qty: 90 TABLET | Refills: 3 | Status: SHIPPED | OUTPATIENT
Start: 2024-04-15

## 2024-04-15 RX ORDER — ATORVASTATIN CALCIUM 40 MG/1
40 TABLET, FILM COATED ORAL DAILY
Qty: 90 TABLET | Refills: 3 | Status: SHIPPED | OUTPATIENT
Start: 2024-04-15 | End: 2025-04-10

## 2024-04-15 RX ORDER — AMLODIPINE BESYLATE 5 MG/1
5 TABLET ORAL DAILY
Qty: 90 TABLET | Refills: 3 | Status: SHIPPED | OUTPATIENT
Start: 2024-04-15 | End: 2025-04-10

## 2024-04-15 ASSESSMENT — ENCOUNTER SYMPTOMS
HEMATURIA: 0
FEVER: 0
UNEXPECTED WEIGHT CHANGE: 0
CONFUSION: 0
DEPRESSION: 0
SHORTNESS OF BREATH: 0
JOINT SWELLING: 0
LOSS OF SENSATION IN FEET: 0
APPETITE CHANGE: 0
DIARRHEA: 0
CONSTIPATION: 0
DIFFICULTY URINATING: 0
PALPITATIONS: 0
COLOR CHANGE: 0
SEIZURES: 0
NERVOUS/ANXIOUS: 0
OCCASIONAL FEELINGS OF UNSTEADINESS: 0
BLOOD IN STOOL: 0
TROUBLE SWALLOWING: 0

## 2024-04-15 ASSESSMENT — ACTIVITIES OF DAILY LIVING (ADL)
BATHING: INDEPENDENT
GROCERY_SHOPPING: INDEPENDENT
MANAGING_FINANCES: INDEPENDENT
DRESSING: INDEPENDENT
TAKING_MEDICATION: INDEPENDENT
DOING_HOUSEWORK: INDEPENDENT

## 2024-04-15 ASSESSMENT — PATIENT HEALTH QUESTIONNAIRE - PHQ9: 2. FEELING DOWN, DEPRESSED OR HOPELESS: NOT AT ALL

## 2024-04-15 NOTE — PROGRESS NOTES
Subjective   Reason for Visit: Verenice Sotelo is an 69 y.o. female here for a Medicare Wellness visit.     Past Medical, Surgical, and Family History reviewed and updated in chart.         NURY Caldera is a 69 year old F presenting today for medication refills. Patient is taking levothyroxine, losartan, hydrochlorothiazide, atorvastatin as prescribed. Interested in switching losartan to amlodipine to help with raynaud's as diagnosed during last visit. Also interested in switching to [employer] for medication dispensation moving forward.    States that her mother suffered a stroke in January, of which she has been the primary caretaker. Her brother was supposed to assist but has not done as much as she had hoped. Became tearful during out visit.     Pessary placed by Dr. Gonzalez to assist with bladder prolapse. Scheduled for surgical intervention in September.    Denies chest pain, shortness of breath, headache, nausea, vomiting. No new symptoms of concern.  Patient Care Team:  Fermin SOTELO DO as PCP - General  Fermin SOTELO DO as PCP - [facility] [employer] PCP     Review of Systems   Constitutional:  Negative for appetite change, fever and unexpected weight change.   HENT:  Negative for congestion and trouble swallowing.    Eyes:  Negative for visual disturbance.   Respiratory:  Negative for shortness of breath.    Cardiovascular:  Negative for chest pain, palpitations and leg swelling.   Gastrointestinal:  Negative for blood in stool, constipation and diarrhea.   Genitourinary:  Negative for difficulty urinating and hematuria.   Musculoskeletal:  Negative for gait problem and joint swelling.   Skin:  Negative for color change.   Allergic/Immunologic: Negative for immunocompromised state.   Neurological:  Negative for seizures and syncope.   Psychiatric/Behavioral:  Negative for confusion and suicidal ideas. The patient is not nervous/anxious.        Objective   Vitals:  /78   Pulse 56  "  Ht 1.549 m (5' 1\")   Wt 70.6 kg (155 lb 9.6 oz)   SpO2 99%   BMI 29.40 kg/m²       Physical Exam  Constitutional:       General: She is not in acute distress.     Appearance: Normal appearance. She is not ill-appearing.   HENT:      Head: Normocephalic and atraumatic.      Right Ear: Tympanic membrane normal.      Left Ear: Tympanic membrane normal.      Nose: Nose normal.      Mouth/Throat:      Mouth: Mucous membranes are moist.   Eyes:      Pupils: Pupils are equal, round, and reactive to light.   Cardiovascular:      Rate and Rhythm: Normal rate and regular rhythm.      Heart sounds: No murmur heard.     No friction rub. No gallop.   Pulmonary:      Effort: Pulmonary effort is normal.      Breath sounds: Normal breath sounds.   Abdominal:      General: Abdomen is flat. There is no distension.      Palpations: Abdomen is soft.      Tenderness: There is no abdominal tenderness. There is no guarding.      Hernia: No hernia is present.   Musculoskeletal:         General: Normal range of motion.   Skin:     General: Skin is warm and dry.   Neurological:      General: No focal deficit present.      Mental Status: She is alert. Mental status is at baseline.      Cranial Nerves: No cranial nerve deficit.      Motor: No weakness.      Gait: Gait normal.   Psychiatric:         Mood and Affect: Mood normal.         Behavior: Behavior normal.         Thought Content: Thought content normal.         Judgment: Judgment normal.         Assessment/Plan   Problem List Items Addressed This Visit       Benign essential hypertension    Relevant Orders    CBC    Comprehensive Metabolic Panel    Hypothyroidism    Relevant Orders    Thyroid Stimulating Hormone     Other Visit Diagnoses       Screening for deficiency anemia    -  Primary    Relevant Orders    CBC    Diabetes mellitus screening        Relevant Orders    Comprehensive Metabolic Panel    Lipid screening        Relevant Orders    Lipid Panel    Screening mammogram, " encounter for        Relevant Orders    BI mammo bilateral screening tomosynthesis    Osteopenia, unspecified location        Relevant Orders    Vitamin D 25-Hydroxy,Total (for eval of Vitamin D levels)          Assessment:  #Hypothyroidism   check tsh  levo 100mcg    #Hyperlipidemia  Lipitor 40mg      #Hypertension  Change losartan to amlodipine to help w/ raynauds  Continue hydrochlorothiazide     #raynauds:  Check lori  Norvasc in the fall      #Left knee pain: Secondary to arthritis with concern for a possible lateral meniscus tear     #Right carotid bruit:   -Negative carotid ultrasound     #Osteopenia   -vit d/calcium     #Pelvic prolapse:  -surgery scheduled    #Diverticulosis on colonoscopy      #obesity:  Lost significant wt from WW     Health Maintenance Reminder:  -Medicare: 2025  -Preventative: next   -Blood Work: today  -Hep C: complete  -Mammogram: ordered  -Colonoscopy: 10/31  -Pap: 5/23  -DEXA (FRAX): 5/25  -Shringix : completed   -Pneumovax: complete  -Prevnar: wants to do next time   -Flu: Yearly

## 2024-04-16 DIAGNOSIS — E03.9 HYPOTHYROIDISM, UNSPECIFIED TYPE: ICD-10-CM

## 2024-04-16 RX ORDER — LEVOTHYROXINE SODIUM 100 UG/1
100 TABLET ORAL DAILY
Qty: 90 TABLET | Refills: 3 | Status: SHIPPED | OUTPATIENT
Start: 2024-04-16 | End: 2025-04-16

## 2024-05-21 ENCOUNTER — APPOINTMENT (OUTPATIENT)
Dept: RADIOLOGY | Facility: HOSPITAL | Age: 69
End: 2024-05-21
Payer: COMMERCIAL

## 2024-05-31 ENCOUNTER — HOSPITAL ENCOUNTER (OUTPATIENT)
Dept: RADIOLOGY | Facility: HOSPITAL | Age: 69
Discharge: HOME | End: 2024-05-31
Payer: COMMERCIAL

## 2024-05-31 VITALS — HEIGHT: 62 IN | BODY MASS INDEX: 29.44 KG/M2 | WEIGHT: 160 LBS

## 2024-05-31 DIAGNOSIS — Z12.31 SCREENING MAMMOGRAM, ENCOUNTER FOR: ICD-10-CM

## 2024-05-31 PROCEDURE — 77063 BREAST TOMOSYNTHESIS BI: CPT | Performed by: RADIOLOGY

## 2024-05-31 PROCEDURE — 77067 SCR MAMMO BI INCL CAD: CPT

## 2024-05-31 PROCEDURE — 77067 SCR MAMMO BI INCL CAD: CPT | Performed by: RADIOLOGY

## 2024-06-19 NOTE — PROGRESS NOTES
"HISTORY OF PRESENT ILLNESS:  Verenice Sotelo is a 69 y.o. female who presents today as a follow-up visit. The patient was last seen on 3/25/2024 with stage II uterovaginal prolapse and mixed incontinence. The patient has not underwent urodynamics testing yet.          Past Medical History  She has a past medical history of Acute maxillary sinusitis, unspecified (10/26/2016), Encounter for immunization (01/09/2020), Encounter for immunization (09/19/2016), Encounter for immunization (09/19/2016), Encounter for screening for other viral diseases (12/01/2017), and Personal history of other specified conditions (04/23/2015).    Surgical History  She has a past surgical history that includes Tubal ligation (10/18/2013) and Cataract extraction (06/11/2015).     Social History  She reports that she has never smoked. She has never used smokeless tobacco. She reports that she does not drink alcohol and does not use drugs.    Family History  Family History   Problem Relation Name Age of Onset    Cervical cancer Mother      Prostate cancer Father      Aneurysm Sister          Ruptured Cerebral    Hyperlipidemia Brother          Allergies  Lisinopril and Sulfa (sulfonamide antibiotics)      A comprehensive 10+ review of systems was negative except for: see hpi                          PHYSICAL EXAMINATION:  BP Readings from Last 3 Encounters:   04/15/24 120/78   09/12/23 138/60   05/04/23 137/80      Wt Readings from Last 3 Encounters:   05/31/24 72.6 kg (160 lb)   04/15/24 70.6 kg (155 lb 9.6 oz)   09/12/23 66.2 kg (146 lb)      BMI: Estimated body mass index is 29.26 kg/m² as calculated from the following:    Height as of 5/31/24: 1.575 m (5' 2\").    Weight as of 5/31/24: 72.6 kg (160 lb).  BSA: Estimated body surface area is 1.78 meters squared as calculated from the following:    Height as of 5/31/24: 1.575 m (5' 2\").    Weight as of 5/31/24: 72.6 kg (160 lb).  HEENT: Normocephalic, atraumatic, PER EOMI, nonicteric, " trachea normal, thyroid normal, oropharynx normal.  CARDIAC: regular rate & rhythm, S1 & S2 normal.  No heaves, thrills, gallops or murmurs.  LUNGS: Clear to auscultation, no spinal or CV tenderness.  EXTREMITIES: No evidence of cyanosis, clubbing or edema.               Assessment:  Verenice Sotelo is a 68 y.o. who presents with stage II uterovaginal prolapse and mixed incontinence     POP:  Currently has a #5 Gellhorn and this is comfortable, she is having quite a bit of discharge    F/up after UDS         MADIE   urgency improved   F/up after UDS     Ruben Johnson MD    Scribe Attestation  By signing my name below, I, Dana John, Scribe   attest that this documentation has been prepared under the direction and in the presence of Ruben Johnson MD.

## 2024-06-20 ENCOUNTER — APPOINTMENT (OUTPATIENT)
Dept: UROLOGY | Facility: CLINIC | Age: 69
End: 2024-06-20
Payer: COMMERCIAL

## 2024-06-20 DIAGNOSIS — N81.9 FEMALE GENITAL PROLAPSE, UNSPECIFIED TYPE: ICD-10-CM

## 2024-06-20 DIAGNOSIS — N32.81 OAB (OVERACTIVE BLADDER): Primary | ICD-10-CM

## 2024-06-20 PROCEDURE — 99213 OFFICE O/P EST LOW 20 MIN: CPT | Performed by: STUDENT IN AN ORGANIZED HEALTH CARE EDUCATION/TRAINING PROGRAM

## 2024-08-02 ENCOUNTER — APPOINTMENT (OUTPATIENT)
Dept: UROLOGY | Facility: CLINIC | Age: 69
End: 2024-08-02
Payer: COMMERCIAL

## 2024-08-16 ENCOUNTER — APPOINTMENT (OUTPATIENT)
Dept: UROLOGY | Facility: CLINIC | Age: 69
End: 2024-08-16
Payer: COMMERCIAL

## 2024-08-19 ENCOUNTER — APPOINTMENT (OUTPATIENT)
Dept: UROLOGY | Facility: CLINIC | Age: 69
End: 2024-08-19
Payer: COMMERCIAL

## 2024-08-20 ENCOUNTER — APPOINTMENT (OUTPATIENT)
Dept: UROLOGY | Facility: CLINIC | Age: 69
End: 2024-08-20
Payer: COMMERCIAL

## 2024-08-20 DIAGNOSIS — N81.9 FEMALE GENITAL PROLAPSE, UNSPECIFIED TYPE: Primary | ICD-10-CM

## 2024-08-20 PROCEDURE — 99442 PR PHYS/QHP TELEPHONE EVALUATION 11-20 MIN: CPT | Performed by: STUDENT IN AN ORGANIZED HEALTH CARE EDUCATION/TRAINING PROGRAM

## 2024-08-20 NOTE — PROGRESS NOTES
Virtual or Telephone Consent    A telephone visit (audio only) between the patient (at the originating site) and the provider (at the distant site) was utilized to provide this telehealth service.   Verbal consent was requested and obtained from Verenice Sotelo on this date, 08/21/24 for a telehealth visit.     HISTORY OF PRESENT ILLNESS:  Verenice Sotelo is a 69 y.o. female who presents today for a virtual follow up visit. She reports she has not had her UDS test done because it keeps getting cancelled. She is still having discharge.          Past Medical History  She has a past medical history of Acute maxillary sinusitis, unspecified (10/26/2016), Arthritis, Diverticulosis, Encounter for immunization (01/09/2020), Encounter for immunization (09/19/2016), Encounter for immunization (09/19/2016), Encounter for screening for other viral diseases (12/01/2017), Female genital prolapse, Hyperlipidemia, Hypertension, Hypothyroidism, Midline cystocele, OAB (overactive bladder), Osteopenia, Personal history of other specified conditions (04/23/2015), Raynaud's disease, Right carotid bruit, and FRANCIS (stress urinary incontinence, female).    Surgical History  She has a past surgical history that includes Tubal ligation (10/18/2013) and Cataract extraction (06/11/2015).     Social History  She reports that she has never smoked. She has never used smokeless tobacco. She reports that she does not drink alcohol and does not use drugs.    Family History  Family History   Problem Relation Name Age of Onset    Cervical cancer Mother      Prostate cancer Father      Aneurysm Sister          Ruptured Cerebral    Hyperlipidemia Brother          Allergies  Lisinopril and Sulfa (sulfonamide antibiotics)      A comprehensive 10+ review of systems was negative except for: see hpi                    Assessment:  Verenice Sotelo is a 68 y.o. who presents with stage II uterovaginal prolapse and mixed incontinence      POP:  Currently has a #5 Gellhorn and this is comfortable, she is having quite a bit of discharge  UDS keeps getting cancelled due  Will have her come in for office CST         MADIE   urgency improved       I spent a total of 11 minutes speaking with the patient on the telephone            All questions and concerns were answered and addressed.  The patient expressed understanding and agrees with the plan.     Ruben Johnson MD    Scribe Attestation  By signing my name below, I, Yana Chew, Scribe   attest that this documentation has been prepared under the direction and in the presence of Ruben Johnson MD.

## 2024-08-26 ENCOUNTER — APPOINTMENT (OUTPATIENT)
Dept: UROLOGY | Facility: CLINIC | Age: 69
End: 2024-08-26
Payer: COMMERCIAL

## 2024-08-26 DIAGNOSIS — N81.9 FEMALE GENITAL PROLAPSE, UNSPECIFIED TYPE: Primary | ICD-10-CM

## 2024-08-26 DIAGNOSIS — N39.3 SUI (STRESS URINARY INCONTINENCE, FEMALE): ICD-10-CM

## 2024-08-26 PROCEDURE — G2211 COMPLEX E/M VISIT ADD ON: HCPCS | Performed by: STUDENT IN AN ORGANIZED HEALTH CARE EDUCATION/TRAINING PROGRAM

## 2024-08-26 PROCEDURE — 99214 OFFICE O/P EST MOD 30 MIN: CPT | Performed by: STUDENT IN AN ORGANIZED HEALTH CARE EDUCATION/TRAINING PROGRAM

## 2024-08-26 RX ORDER — TAMSULOSIN HYDROCHLORIDE 0.4 MG/1
CAPSULE ORAL
Qty: 10 CAPSULE | Refills: 0 | Status: SHIPPED | OUTPATIENT
Start: 2024-08-26

## 2024-08-26 NOTE — PROGRESS NOTES
"HISTORY OF PRESENT ILLNESS:  Verenice Sotelo is a 69 y.o. female who presents today for a follow up visit.  Planning on sacrocolpopexy in a few weeks.  Procedure discussed in detail.         Past Medical History  She has a past medical history of Acute maxillary sinusitis, unspecified (10/26/2016), Arthritis, Diverticulosis, Encounter for immunization (01/09/2020), Encounter for immunization (09/19/2016), Encounter for immunization (09/19/2016), Encounter for screening for other viral diseases (12/01/2017), Female genital prolapse, Hyperlipidemia, Hypertension, Hypothyroidism, Midline cystocele, OAB (overactive bladder), Osteopenia, Personal history of other specified conditions (04/23/2015), Raynaud's disease, Right carotid bruit, and FRANCIS (stress urinary incontinence, female).    Surgical History  She has a past surgical history that includes Tubal ligation (10/18/2013) and Cataract extraction (06/11/2015).     Social History  She reports that she has never smoked. She has never used smokeless tobacco. She reports that she does not drink alcohol and does not use drugs.    Family History  Family History   Problem Relation Name Age of Onset    Cervical cancer Mother      Prostate cancer Father      Aneurysm Sister          Ruptured Cerebral    Hyperlipidemia Brother          Allergies  Lisinopril and Sulfa (sulfonamide antibiotics)      A comprehensive 10+ review of systems was negative except for: see hpi                          PHYSICAL EXAMINATION:  BP Readings from Last 3 Encounters:   04/15/24 120/78   09/12/23 138/60   05/04/23 137/80      Wt Readings from Last 3 Encounters:   05/31/24 72.6 kg (160 lb)   04/15/24 70.6 kg (155 lb 9.6 oz)   09/12/23 66.2 kg (146 lb)      BMI: Estimated body mass index is 29.26 kg/m² as calculated from the following:    Height as of 5/31/24: 1.575 m (5' 2\").    Weight as of 5/31/24: 72.6 kg (160 lb).  BSA: Estimated body surface area is 1.78 meters squared as calculated from " "the following:    Height as of 5/31/24: 1.575 m (5' 2\").    Weight as of 5/31/24: 72.6 kg (160 lb).  HEENT: Normocephalic, atraumatic, PER EOMI, nonicteric, trachea normal, thyroid normal, oropharynx normal.  CARDIAC: regular rate & rhythm, S1 & S2 normal.  No heaves, thrills, gallops or murmurs.  LUNGS: Clear to auscultation, no spinal or CV tenderness.  EXTREMITIES: No evidence of cyanosis, clubbing or edema.       +CST         Assessment:  Verenice Sotelo is a 69 y.o. who presents with stage II uterovaginal prolapse and mixed incontinence     POP:  Currently has a #5 Gellhorn and this is comfortable, she is having quite a bit of discharge          MADIE   urgency improved   She does have a positive CST, we will plan on sling    Flomax prescribed      Follow up post op, 2 weeks with Sameera and 6 weeks with me     All questions and concerns were answered and addressed.  The patient expressed understanding and agrees with the plan.     Ruben Johnson MD    Scribe Attestation  By signing my name below, I, Yana Chew, Scribdana   attest that this documentation has been prepared under the direction and in the presence of Ruben Johnson MD.  "

## 2024-08-30 ENCOUNTER — TELEPHONE (OUTPATIENT)
Dept: OBSTETRICS AND GYNECOLOGY | Facility: HOSPITAL | Age: 69
End: 2024-08-30

## 2024-08-30 ENCOUNTER — PRE-ADMISSION TESTING (OUTPATIENT)
Dept: PREADMISSION TESTING | Facility: HOSPITAL | Age: 69
End: 2024-08-30
Payer: COMMERCIAL

## 2024-08-30 VITALS
OXYGEN SATURATION: 98 % | BODY MASS INDEX: 30.22 KG/M2 | WEIGHT: 160.05 LBS | DIASTOLIC BLOOD PRESSURE: 83 MMHG | HEIGHT: 61 IN | TEMPERATURE: 97.5 F | SYSTOLIC BLOOD PRESSURE: 149 MMHG | RESPIRATION RATE: 18 BRPM | HEART RATE: 58 BPM

## 2024-08-30 DIAGNOSIS — Z01.818 PREOPERATIVE EXAMINATION: Primary | ICD-10-CM

## 2024-08-30 DIAGNOSIS — N81.9 FEMALE GENITAL PROLAPSE, UNSPECIFIED TYPE: ICD-10-CM

## 2024-08-30 DIAGNOSIS — N39.3 SUI (STRESS URINARY INCONTINENCE, FEMALE): ICD-10-CM

## 2024-08-30 DIAGNOSIS — N32.81 OAB (OVERACTIVE BLADDER): ICD-10-CM

## 2024-08-30 LAB
ALBUMIN SERPL BCP-MCNC: 4.3 G/DL (ref 3.4–5)
ALP SERPL-CCNC: 65 U/L (ref 33–136)
ALT SERPL W P-5'-P-CCNC: 26 U/L (ref 7–45)
ANION GAP SERPL CALC-SCNC: 11 MMOL/L (ref 10–20)
AST SERPL W P-5'-P-CCNC: 24 U/L (ref 9–39)
BASOPHILS # BLD AUTO: 0.03 X10*3/UL (ref 0–0.1)
BASOPHILS NFR BLD AUTO: 0.5 %
BILIRUB SERPL-MCNC: 0.7 MG/DL (ref 0–1.2)
BUN SERPL-MCNC: 19 MG/DL (ref 6–23)
CALCIUM SERPL-MCNC: 9.2 MG/DL (ref 8.6–10.3)
CHLORIDE SERPL-SCNC: 102 MMOL/L (ref 98–107)
CO2 SERPL-SCNC: 29 MMOL/L (ref 21–32)
CREAT SERPL-MCNC: 0.73 MG/DL (ref 0.5–1.05)
EGFRCR SERPLBLD CKD-EPI 2021: 89 ML/MIN/1.73M*2
EOSINOPHIL # BLD AUTO: 0.12 X10*3/UL (ref 0–0.7)
EOSINOPHIL NFR BLD AUTO: 2.1 %
ERYTHROCYTE [DISTWIDTH] IN BLOOD BY AUTOMATED COUNT: 11.7 % (ref 11.5–14.5)
GLUCOSE SERPL-MCNC: 86 MG/DL (ref 74–99)
HCT VFR BLD AUTO: 40.2 % (ref 36–46)
HGB BLD-MCNC: 13.5 G/DL (ref 12–16)
IMM GRANULOCYTES # BLD AUTO: 0.02 X10*3/UL (ref 0–0.7)
IMM GRANULOCYTES NFR BLD AUTO: 0.4 % (ref 0–0.9)
LYMPHOCYTES # BLD AUTO: 2 X10*3/UL (ref 1.2–4.8)
LYMPHOCYTES NFR BLD AUTO: 35.1 %
MCH RBC QN AUTO: 30.5 PG (ref 26–34)
MCHC RBC AUTO-ENTMCNC: 33.6 G/DL (ref 32–36)
MCV RBC AUTO: 91 FL (ref 80–100)
MONOCYTES # BLD AUTO: 0.5 X10*3/UL (ref 0.1–1)
MONOCYTES NFR BLD AUTO: 8.8 %
NEUTROPHILS # BLD AUTO: 3.02 X10*3/UL (ref 1.2–7.7)
NEUTROPHILS NFR BLD AUTO: 53.1 %
NRBC BLD-RTO: 0 /100 WBCS (ref 0–0)
PLATELET # BLD AUTO: 208 X10*3/UL (ref 150–450)
POTASSIUM SERPL-SCNC: 3.8 MMOL/L (ref 3.5–5.3)
PROT SERPL-MCNC: 6.9 G/DL (ref 6.4–8.2)
RBC # BLD AUTO: 4.42 X10*6/UL (ref 4–5.2)
SODIUM SERPL-SCNC: 138 MMOL/L (ref 136–145)
WBC # BLD AUTO: 5.7 X10*3/UL (ref 4.4–11.3)

## 2024-08-30 PROCEDURE — 87081 CULTURE SCREEN ONLY: CPT | Mod: GEALAB

## 2024-08-30 PROCEDURE — 36415 COLL VENOUS BLD VENIPUNCTURE: CPT

## 2024-08-30 PROCEDURE — 80053 COMPREHEN METABOLIC PANEL: CPT

## 2024-08-30 PROCEDURE — 93005 ELECTROCARDIOGRAM TRACING: CPT

## 2024-08-30 PROCEDURE — 99204 OFFICE O/P NEW MOD 45 MIN: CPT | Performed by: NURSE PRACTITIONER

## 2024-08-30 PROCEDURE — 85025 COMPLETE CBC W/AUTO DIFF WBC: CPT

## 2024-08-30 RX ORDER — CHLORHEXIDINE GLUCONATE ORAL RINSE 1.2 MG/ML
15 SOLUTION DENTAL DAILY
Qty: 30 ML | Refills: 0 | Status: SHIPPED | OUTPATIENT
Start: 2024-08-30 | End: 2024-09-01

## 2024-08-30 RX ORDER — MULTIVIT-MIN/IRON FUM/FOLIC AC 7.5 MG-4
2 TABLET ORAL DAILY
COMMUNITY

## 2024-08-30 RX ORDER — CHLORHEXIDINE GLUCONATE 40 MG/ML
1 SOLUTION TOPICAL DAILY
Start: 2024-08-30 | End: 2024-09-04

## 2024-08-30 ASSESSMENT — DUKE ACTIVITY SCORE INDEX (DASI)
TOTAL_SCORE: 44.7
CAN YOU WALK INDOORS, SUCH AS AROUND YOUR HOUSE: YES
CAN YOU RUN A SHORT DISTANCE: YES
CAN YOU DO HEAVY WORK AROUND THE HOUSE LIKE SCRUBBING FLOORS OR LIFTING AND MOVING HEAVY FURNITURE: YES
DASI METS SCORE: 8.2
CAN YOU DO YARD WORK LIKE RAKING LEAVES, WEEDING OR PUSHING A MOWER: YES
CAN YOU DO LIGHT WORK AROUND THE HOUSE LIKE DUSTING OR WASHING DISHES: YES
CAN YOU PARTICIPATE IN STRENOUS SPORTS LIKE SWIMMING, SINGLES TENNIS, FOOTBALL, BASKETBALL, OR SKIING: NO
CAN YOU DO MODERATE WORK AROUND THE HOUSE LIKE VACUUMING, SWEEPING FLOORS OR CARRYING GROCERIES: YES
CAN YOU CLIMB A FLIGHT OF STAIRS OR WALK UP A HILL: YES
CAN YOU WALK A BLOCK OR TWO ON LEVEL GROUND: YES
CAN YOU TAKE CARE OF YOURSELF (EAT, DRESS, BATHE, OR USE TOILET): YES
CAN YOU HAVE SEXUAL RELATIONS: YES
CAN YOU PARTICIPATE IN MODERATE RECREATIONAL ACTIVITIES LIKE GOLF, BOWLING, DANCING, DOUBLES TENNIS OR THROWING A BASEBALL OR FOOTBALL: NO

## 2024-08-30 ASSESSMENT — ENCOUNTER SYMPTOMS
FEVER: 1
DYSPNEA WITH EXERTION: 0
WEAKNESS: 1
NECK NEGATIVE: 1
SHORTNESS OF BREATH: 0
GASTROINTESTINAL NEGATIVE: 1
LIGHT-HEADEDNESS: 0
CHILLS: 0
MUSCULOSKELETAL NEGATIVE: 1
NUMBNESS: 0
PALPITATIONS: 0
WHEEZING: 0
COUGH: 0

## 2024-08-30 ASSESSMENT — PAIN - FUNCTIONAL ASSESSMENT: PAIN_FUNCTIONAL_ASSESSMENT: 0-10

## 2024-08-30 ASSESSMENT — LIFESTYLE VARIABLES: SMOKING_STATUS: NONSMOKER

## 2024-08-30 ASSESSMENT — ACTIVITIES OF DAILY LIVING (ADL): ADL_SCORE: 0

## 2024-08-30 ASSESSMENT — PAIN SCALES - GENERAL: PAINLEVEL_OUTOF10: 1

## 2024-08-30 NOTE — TELEPHONE ENCOUNTER
Spoke with Verenice Sotelo regarding participation in the Constipation Control after Prolapse Surgery study (Pre- and post-operative compared to post-operative only polyethylene glycol 3350 for minimally invasive urogynecologic prolapse surgery IRB # [id number]).     Study screening performed:  Yes to all of the Inclusion criteria:  *Age 18-88yo  *Assigned female sex at birth  *Undergoing an apical prolapse procedure including: sacrocolpopexy, sacrospinous ligament suspension, uterosacral ligament suspension, or colpocleisis. Planned urogynecologic procedure may include hysterectomy, adnexectomy, colporrhaphy, perineorrhaphy, or treatment of stress urinary incontinence.  *Planning to undergo primary procedure as above via minimally invasive route: vaginal or laparoscopic (including robotic-assisted). Including those that have an unplanned conversion intraoperatively of minimally invasive route to open laparotomy.    Exclusion criteria:  YES- Diagnosis of IBS, IBD, slow transit constipation, obstructed defecation, or symptomatic constipation at time of study screening and recruitment. (Slow transit constipation and obstructed defecation defined as per AGA 2013 constipation statement. Symptomatic constipation defined as any of the following: Lamb Stool Scale score of 1 or 2, PAC-SYM score greater than 1.0, self-reported 2 or less bowel movements per week.)  No - Current use of pharmacologic laxative agent >=1x/week (prescription or OTC) for the treatment of constipation symptoms  No - Allergy or intolerance to polyethylene glycol 3350  No - Planning to undergo surgery via laparotomy. Scheduled for any concurrent non-urogynecologic procedure, ex abdominoplasty   No - History of large bowel resection, surgical treatment of constipation, or anal sphincter surgery  No - History of sacral neuromodulation    No - Current or prior radiation therapy to the abdomen or pelvis  No - Current or prior diagnosis of  malignancy   No - Currently pregnant at time of study enrollment by patient report     Participant was consented in person 8/26/24.  Patient no longer eligible based on baseline constipation symptoms. No further study participation or communication planned. All questions answered.     Dulce Colvin MD   [facility] Fellow and

## 2024-08-30 NOTE — PREPROCEDURE INSTRUCTIONS
Thank you for visiting Preadmission Testing (PAT) today for your pre-procedure evaluation, you were seen by     Kami Valentine CNP  Pre Admission Testing  [facility]  [phone]    This summary includes instructions and information to aid you during your perioperative period.  Please read carefully. If you have any questions about your visit today, please call the number listed above.  If you become ill or have any changes to your health before your surgery, please contact your primary care provider and alert your surgeon.    Preparing for your Surgery       Exercises  Preoperative Deep Breathing Exercises  Why it is important to do deep breathing exercises before my surgery?  Deep breathing exercises strengthen your breathing muscles.  This helps you to recover after your surgery and decreases the chance of breathing complications.  How are the deep breathing exercises done?  Sit straight with your back supported.  Breathe in deeply and slowly through your nose. Your lower rib cage should expand and your abdomen may move forward.  Hold that breath for 3 to 5 seconds.  Breathe out through pursed lips, slowly and completely.  Rest and repeat 10 times every hour while awake.  Rest longer if you become dizzy or lightheaded.       Preoperative Brain Exercises    What are brain exercises?  A brain exercise is any activity that engages your thinking (cognitive) skills.    What types of activities are considered brain exercises?  Jigsaw puzzles, crossword puzzles, word jumble, memory games, word search, and many more.  Many can be found free online or on your phone via a mobile rosina.    Why should I do brain exercises before my surgery?  More recent research has shown brain exercise before surgery can lower the risk of postoperative delirium (confusion) which can be especially important for older adults.  Patients who did brain exercises for 5 to 10 hours the days before surgery, cut their risk  of postoperative delirium in half up to 1 week after surgery.    Sit-to-Stand Exercise    What is the sit-to-stand exercise?  The sit-to-stand exercise strengthens the muscles of your lower body and muscles in the center of your body (core muscles for stability) helping to maintain and improve your strength and mobility.  How do I do the sit-to-stand exercise?  The goal is to do this exercise without using your arms or hands.  If this is too difficult, use your arms and hands or a chair with armrests to help slowly push yourself to the standing position and lower yourself back to the sitting position. As the movement becomes easier use your arms and hands less.    Steps to the sit-to-stand exercise  Sit up tall in a sturdy chair, knees bent, feet flat on the floor shoulder-width apart.  Shift your hips/pelvis forward in the chair to correctly position yourself for the next movement.  Lean forward at your hips.  Stand up straight putting equal weight on both feet.  Check to be sure you are properly aligned with the chair, in a slow controlled movement sit back down.  Repeat this exercise 10-15 times.  If needed you can do it fewer times until your strength improves.  Rest for 1 minute.  Do another 10-15 sit-to-stand exercises.  Try to do this in the morning and evening.        Instructions    Preoperative Fasting Guidelines    Why must I stop eating and drinking near surgery time?  With sedation, food or liquid in your stomach can enter your lungs causing serious complications  Food can increase nausea and vomiting  When do I need to stop eating and drinking before my surgery?      Do not eat any food or drink any liquids after midnight the night before your surgery/procedure.  You may have sips of water to take medications.        Simple things you can do to help prevent blood clots     Blood clots are blockages that can form in the body's veins. When a blood clot forms in your deep veins, it may be called a deep  vein thrombosis, or DVT for short. Blood clots can happen in any part of the body where blood flows, but they are most common in the arms and legs. If a piece of a blood clot breaks free and travels to the lungs, it is called a pulmonary embolus (PE). A PE can be a very serious problem.         Being in the hospital or having surgery can raise your chances of getting a blood clot because you may not be well enough to move around as much as you normally do.         Ways you can help prevent blood clots in the hospital       Wearing SCDs  SCDs stands for Sequential Compression Devices.   SCDs are special sleeves that wrap around your legs. They attach to a pump that fills them with air to gently squeeze your legs every few minutes.  This helps return the blood in your legs to your heart.   SCDs should only be taken off when walking or bathing. SCDs may not be comfortable, but they can help save your life.              Pump SCD leg sleeves  Wearing compression stockings - if your doctor orders them. These special snug-fitting stockings gently squeeze your legs to help blood flow.       Walking. Walking helps move the blood in your legs.   If your doctor says it is ok, try walking the halls at least   5 times a day. Ask us to help you get up, so you don't fall.      Taking any blood-thinning medicines your doctor orders.              Ways you can help prevent blood clots at home         Wearing compression stockings - if your doctor orders them.   Walking - to help move the blood in your legs.    Taking any blood-thinning medicines your doctor orders.      Signs of a blood clot or PE    Tell your doctor or nurse right away if you have any of the problems listed below.         If you are at home, seek medical care right away. Call 911 for chest pain or problems breathing.            Signs of a blood clot (DVT) - such as pain, swelling, redness, or warmth in your arm or legs.  Signs of a pulmonary embolism (PE) - such as  "chest pain or feeling short of breath      Tobacco and Alcohol;  Do not drink alcohol or smoke within 24 hours of surgery.  It is best to quit smoking for as long as possible before any surgery or procedure.        Other Instructions  Why did I have my nose, under my arms, and groin swabbed? The purpose of the swab is to identify Staphylococcus aureus inside your nose or on your skin.  The swab was sent to the laboratory for culture.  A positive swab/culture for Staphylococcus aureus is called colonization or carriage.     What is Staphylococcus aureus? Staphylococcus aureus, also known as \"staph\", is a germ found on the skin or in the nose of healthy people.  Sometimes Staphylococcus aureus can get into the body and cause an infection.  This can be minor (such as pimples, boils, or other skin problems).  It might also be serious (such as a blood infection, pneumonia, or a surgical site infection).     What is Staphylococcus aureus colonization or carriage? Colonization or carriage means that a person has the germ but is not sick from it.  These bacteria can be spread on the hands or when breathing or sneezing.   How is Staphylococcus aureus spread? It is most often spread by close contact with a person or item that carries it.   What happens if my culture is positive for Staphylococcus aureus? Your doctor/medical team will use this information to guide any antibiotic treatment which may be necessary.  Regardless of the culture results, we will clean the inside of your nose with a betadine swab just before you have your surgery.   Will I get an infection if I have Staphylococcus aureus in my nose or on my skin? Anyone can get an infection with Staphylococcus aureus.  However, the best way to reduce your risk of infection is to follow the instructions provided to you for the use of your CHG soap and dental rinse.      Body Wash:     What is a home preoperative antibacterial shower? This shower is a way of cleaning " the skin with a germ-killing solution before surgery.  The solution contains chlorhexidine, commonly known as CHG.  CHG is a skin cleanser with germ-killing ability.  Let your doctor know if you are allergic to chlorhexidine.   Why do I need to take a preoperative antibacterial shower? Skin is not sterile.  It is best to try to make your skin as free of germs as possible before surgery.  Proper cleansing with a germ-killing soap before surgery can lower the number of germs on your skin.  This helps to reduce the risk of infection at the surgical site.    Following the instructions listed below will help you prepare your skin for surgery.   How do I use the solution? Steps:  Begin using your CHG soap 5 days before your scheduled surgery on ______9/16/2024_____.     Keep CHG soap away from ear canals and eyes.  Rinse completely, do not condition.  Hair extensions should be removed. ,      Oral/Dental Rinse:     What is oral/dental rinse?  It is a mouthwash. It is a way of cleaning the mouth with a germ-killing solution before your surgery.  The solution contains chlorhexidine, commonly known as CHG. It is used inside the mouth to kill a bacteria known as Staphylococcus aureus.  Let your doctor know if you are allergic to Chlorhexidine.   Why do I need to use CHG oral/dental rinse? The CHG oral/dental rinse helps to kill a bacteria in your mouth known as Staphylococcus aureus.  This reduces the risk of infection at the surgical site.    Using your CHG oral/dental rinse STEPS: Use your CHG oral/dental rinse after you brush your teeth the night before (at bedtime) and the morning of your surgery.  Follow all directions on your prescription label.    Use the cap on the container to measure 15 ml.  Swish (gargle if you can) the mouthwash in your mouth for at least 30 seconds, (do not swallow) and spit out.  After you use your CHG rinse, do not rinse your mouth with water, drink or eat.    Please refer to the prescription  label for the appropriate time to resume oral intake   What side effects might I have using the CHG oral/dental rinse? CHG rinse will stick to plaque on the teeth.  Brush and floss just before use.  Teeth brushing will help avoid staining of plaque during use.          The Week before Surgery        Seven days before Surgery  Check your PAT medication instructions  Do the exercises provided to you by PAT  Arrange for a responsible, adult licensed  to take you home after surgery and stay with you for 24 hours.  You will not be permitted to drive yourself home if you have received any anesthetic/sedation  Six days before surgery  Check your PAT medication instructions  Do the exercises provided to you by PAT  Start using Chlorhexidene (CHG) body wash if prescribed  Five days before surgery  Check your PAT medication instructions  Do the exercises provided to you by PAT  Continue to use CHG body wash if prescribed  Three days before surgery  Check your PAT medication instructions  Do the exercises provided to you by PAT  Continue to use CHG body wash if prescribed  Two days before surgery  Check your PAT medication instructions  Do the exercises provided to you by PAT  Continue to use CHG body wash if prescribed    The Day before Surgery       Check your PAT medication and all other PAT instructions including when to stop eating and drinking  You will be called with your arrival time for surgery in the late afternoon.  If you do not receive a call please reach out to [facility] Pre-Op. [phone]  Do not smoke or drink 24 hours before surgery  Prepare items to bring with you to the hospital  Shower with your chlorhexidine wash if prescribed  Brush your teeth and use your chlorhexidine dental rinse if prescribed    The Day of Surgery       Check your PAT medication instructions  Ensure you follow the instructions for when to stop eating and drinking  Shower, if prescribed use CHG.  Do not apply any lotions, creams,  moisturizers, perfume or deodorant  Brush your teeth and use your CHG dental rinse if prescribed  Wear loose comfortable clothing  Avoid make-up  Remove  jewelry and piercings, consider professional piercing removal with a plastic spacer if needed  Bring photo ID and Insurance card  Bring an accurate medication list that includes medication dose, frequency and allergies  Bring a copy of your advanced directives (will, health care power of )  Bring any devices and controllers as well as medical devices you have been provided with for surgery (CPAP, slings, braces, etc.)  Dentures, eyeglasses, and contacts will be removed before surgery, please bring cases for contacts or glasses

## 2024-08-30 NOTE — CPM/PAT H&P
CPM/PAT Evaluation       Name: Verenice OREILLY Ashleigh (Verenice L Ashleigh)  /Age: 1955/69 y.o.     Visit Type:   In-Person       Chief Complaint: FRANCIS, OAB    HPI 68 y/o female scheduled for disorder sling placement for laparoscopic hysterectomy and sacrocolpopexy on 2024 with  Dr. Johnson secondary to FRANCIS, OAB.  PMHX includes FRANCIS, OAB, HTN.  PAT is consulted today for perioperative risk stratification and optimization.      Past Medical History:   Diagnosis Date    Acute maxillary sinusitis, unspecified 10/26/2016    Acute maxillary sinusitis    Arthritis     Diverticulosis     Encounter for immunization 2020    Need for shingles vaccine    Encounter for immunization 2016    Need for shingles vaccine    Encounter for immunization 2016    Needs flu shot    Encounter for screening for other viral diseases 2017    Need for hepatitis C screening test    Female genital prolapse     Hyperlipidemia     Hypertension     Hypothyroidism     Midline cystocele     OAB (overactive bladder)     Osteopenia     Personal history of other specified conditions 2015    History of diarrhea    Raynaud's disease     Right carotid bruit     FRANCIS (stress urinary incontinence, female)     Wears glasses     Wears partial dentures     lower       Past Surgical History:   Procedure Laterality Date    CATARACT EXTRACTION  2015    Cataract Surgery    TUBAL LIGATION  10/18/2013    Tubal Ligation       Patient  has no history on file for sexual activity.    Family History   Problem Relation Name Age of Onset    Cervical cancer Mother      Prostate cancer Father      Diabetes type II Father      Aneurysm Sister          Ruptured Cerebral    Hyperlipidemia Brother      Diabetes type II Brother         Allergies   Allergen Reactions    Lisinopril Cough    Sulfa (Sulfonamide Antibiotics) Other, Rash and Unknown       Prior to Admission medications    Medication Sig Start Date End Date Taking? Authorizing  Provider   amLODIPine (Norvasc) 5 mg tablet Take 1 tablet (5 mg) by mouth once daily. 4/15/24 4/10/25  Fermin Bunch V DO   aspirin 81 mg EC tablet Take 1 tablet (81 mg) by mouth once daily. 10/27/16   Historical Provider, MD   atorvastatin (Lipitor) 40 mg tablet Take 1 tablet (40 mg) by mouth once daily. 4/15/24 4/10/25  Fermin Bunch V, DO   calcium carbonate-vitamin D3 600 mg-20 mcg (800 unit) tablet Take 1 tablet by mouth once daily.    Historical Provider, MD   chlorhexidine (Hibiclens) 4 % external liquid Apply 1 Application topically once daily for 5 days. Use per CPM/PAT provided instructions 8/30/24 9/4/24  LISA Jacobson   chlorhexidine (Peridex) 0.12 % solution Use 15 mL in the mouth or throat once daily for 2 doses. 8/30/24 9/1/24  LISA Jacobson   hydroCHLOROthiazide (Microzide) 12.5 mg tablet Take 1 tablet (12.5 mg) by mouth once daily. 4/15/24   Fermin Bunch V, DO   levothyroxine (Synthroid, Levoxyl) 100 mcg tablet Take 1 tablet (100 mcg) by mouth once daily. 4/16/24 4/16/25  Fermin Bunch V, DO   meloxicam (Mobic) 7.5 mg tablet Take 1 tablet (7.5 mg) by mouth once daily. 4/15/24 4/15/25  Fermin Bunch V DO   POTASSIUM GLUCONATE ORAL Take 1 tablet by mouth once daily.    Historical Provider, MD   tamsulosin (Flomax) 0.4 mg 24 hr capsule Take 3 days before surgery and 7 days after 8/26/24   Ruben Johnson MD        PAT ROS:   Constitutional:    fever   no chills  Neuro/Psych:    no numbness   weakness   no light-headedness  Eyes:    use of corrective lenses  Ears:   neg    Nose:   Mouth:   Throat:   neg    Neck:   neg    Cardio:    no chest pain   no palpitations   no GRAYSON  Respiratory:    no cough   no wheezing   no shortness of breath  Endocrine:   GI:   neg    :   neg    Musculoskeletal:   neg    Hematologic:    history of blood transfusion  Skin:      Physical Exam  Constitutional:       Appearance: Normal appearance.   HENT:      Head: Normocephalic and  atraumatic.      Mouth/Throat:      Mouth: Mucous membranes are moist.      Pharynx: Oropharynx is clear.   Eyes:      Extraocular Movements: Extraocular movements intact.      Pupils: Pupils are equal, round, and reactive to light.   Cardiovascular:      Rate and Rhythm: Normal rate and regular rhythm.   Pulmonary:      Effort: Pulmonary effort is normal.      Breath sounds: Normal breath sounds.   Abdominal:      General: Abdomen is flat.      Palpations: Abdomen is soft.   Musculoskeletal:         General: Normal range of motion.      Cervical back: Normal range of motion and neck supple.   Skin:     General: Skin is warm and dry.   Neurological:      General: No focal deficit present.      Mental Status: She is alert and oriented to person, place, and time.   Psychiatric:         Mood and Affect: Mood normal.         Behavior: Behavior normal.          PAT AIRWAY:   Airway:     Mallampati::  II    Neck ROM::  Full   partials      Visit Vitals  /83   Pulse 58   Temp 36.4 °C (97.5 °F)   Resp 18       DASI Risk Score      Flowsheet Row Most Recent Value   DASI SCORE 44.7   METS Score (Will be calculated only when all the questions are answered) 8.2          Caprini DVT Assessment      Flowsheet Row Most Recent Value   DVT Score 8   Current Status Major surgery planned, lasting over 3 hours   Age 60-75 years   BMI 30 or less          Modified Frailty Index    No data to display       CHADS2 Stroke Risk  Current as of 25 minutes ago        N/A 3 to 100%: High Risk   2 to < 3%: Medium Risk   0 to < 2%: Low Risk     Last Change: N/A          This score determines the patient's risk of having a stroke if the patient has atrial fibrillation.        This score is not applicable to this patient. Components are not calculated.          Revised Cardiac Risk Index      Flowsheet Row Most Recent Value   Revised Cardiac Risk Calculator 0          Apfel Simplified Score      Flowsheet Row Most Recent Value   Apfel  Simplified Score Calculator 3          Risk Analysis Index Results This Encounter         8/30/2024  1029             HU Cancer History: Patient does not indicate history of cancer    Total Risk Analysis Index Score Without Cancer: 22    Total Risk Analysis Index Score: 22          Stop Bang Score      Flowsheet Row Most Recent Value   Do you snore loudly? 0   Do you often feel tired or fatigued after your sleep? 0   Has anyone ever observed you stop breathing in your sleep? 0   Do you have or are you being treated for high blood pressure? 1   Recent BMI (Calculated) 29.3   Is BMI greater than 35 kg/m2? 0=No   Age older than 50 years old? 1=Yes   Is your neck circumference greater than 17 inches (Male) or 16 inches (Female)? 0   Gender - Male 0=No   STOP-BANG Total Score 2                  Assessment and Plan:     HPI 68 y/o female scheduled for disorder sling placement for laparoscopic hysterectomy and sacrocolpopexy on 9/16/2024 with  Dr. Johnson secondary to FRANCIS, OAB.  PMHX includes FRANCIS, OAB, HTN.  PAT is consulted today for perioperative risk stratification and optimization.  Neuro:  No neurologic diagnosis, however, the patient is at increased risk for perioperative delirium secondary to  age, polypharmacy  Patient is at increased risk for perioperative CVA secondary to  perioperative interruption of antithrombotic, HTN, increased age    HEENT:  No HEENT diagnosis or significant findings on chart review or clinical presentation and evaluation. No further preoperative testing/intervention indicated at this time.    Cardiovascular:  Hypertension -managed by PCP  Controlled  Will continue amlodipine and hydrochlorothiazide  Holding aspirin 7 days prior to procedure  METS: 8.2  RCRI: 0 points, 3.9%  risk for postoperative MACE   PRESLEY: 0.1% risk for 30 day postoperative MACE  EKG - as above    Pulmonary:  No pulmonary diagnosis, however patient is at increased risk of perioperative complications secondary to  age >  60, duration of surgery > 2 hours  Stop Bang score is 2 placing patient at low risk for LIDYA  ARISCAT: 26-44 points, 13.3% risk of in-hospital postoperative pulmonary complication  PRODIGY: Moderate risk for opioid induced respiratory depression  Pumonary education discussed, patient also provided deep breathing exercises with  educational handout    Renal:   No renal diagnosis, however patient is at increase risk for perioperative renal complications secondary to  Age equal to or greater than 56, BMI equal to or greater than 30, HTN, use of an ace, arb, or NSAID      Endocrine:  No endocrine diagnosis or significant findings on chart review or clinical presentation and evaluation. No further testing or intervention is indicated at this time.    Hematologic:  No hematologic diagnosis, however patient is at an increased risk for DVT  Caprini Score 8, patient at High risk for perioperative DVT.  Patient provided with VTE education/handout.    Gastrointestinal:   No GI diagnosis or significant findings on chart review or clinical presentation and evaluation.   Apfel 3    OB/GYN  Follows with Dr. Johnson.  Last seen 8/26/2024 for overactive bladder and FRANCIS  Plan on sling      Infectious disease:   No infectious diagnosis or significant findings on chart review or clinical presentation and evaluation.   Prescription provided for CHG body wash and dental rinse. CHG use instructions reviewed and provided to patient.  Staph screen collected    Musculoskeletal:   No diagnosis or significant findings on chart review or clinical presentation and evaluation.     Anesthesia/Airway:  No anesthesia complications      Medication instructions and NPO guidelines reviewed with the patient.  All questions or concerns discussed and addressed.      Labs and EKG ordered

## 2024-09-01 LAB — STAPHYLOCOCCUS SPEC CULT: NORMAL

## 2024-09-03 LAB
ATRIAL RATE: 62 BPM
P AXIS: 48 DEGREES
P OFFSET: 167 MS
P ONSET: 134 MS
PR INTERVAL: 176 MS
Q ONSET: 222 MS
QRS COUNT: 10 BEATS
QRS DURATION: 74 MS
QT INTERVAL: 410 MS
QTC CALCULATION(BAZETT): 416 MS
QTC FREDERICIA: 414 MS
R AXIS: 36 DEGREES
T AXIS: 61 DEGREES
T OFFSET: 427 MS
VENTRICULAR RATE: 62 BPM

## 2024-09-11 ENCOUNTER — ANESTHESIA EVENT (OUTPATIENT)
Dept: OPERATING ROOM | Facility: HOSPITAL | Age: 69
End: 2024-09-11
Payer: COMMERCIAL

## 2024-09-13 ENCOUNTER — OFFICE VISIT (OUTPATIENT)
Dept: PRIMARY CARE | Facility: CLINIC | Age: 69
End: 2024-09-13
Payer: COMMERCIAL

## 2024-09-13 ENCOUNTER — TELEPHONE (OUTPATIENT)
Dept: UROLOGY | Facility: CLINIC | Age: 69
End: 2024-09-13
Payer: COMMERCIAL

## 2024-09-13 VITALS
OXYGEN SATURATION: 97 % | HEART RATE: 79 BPM | HEIGHT: 61 IN | BODY MASS INDEX: 30.21 KG/M2 | WEIGHT: 160 LBS | DIASTOLIC BLOOD PRESSURE: 60 MMHG | SYSTOLIC BLOOD PRESSURE: 124 MMHG

## 2024-09-13 DIAGNOSIS — S80.811A CAT SCRATCH OF LOWER LEG, RIGHT, INITIAL ENCOUNTER: ICD-10-CM
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
## 2024-09-13 DIAGNOSIS — L03.115 CELLULITIS OF RIGHT LOWER EXTREMITY: Primary | ICD-10-CM

## 2024-09-13 PROCEDURE — 1160F RVW MEDS BY RX/DR IN RCRD: CPT | Performed by: FAMILY MEDICINE

## 2024-09-13 PROCEDURE — 3078F DIAST BP <80 MM HG: CPT | Performed by: FAMILY MEDICINE

## 2024-09-13 PROCEDURE — 3074F SYST BP LT 130 MM HG: CPT | Performed by: FAMILY MEDICINE

## 2024-09-13 PROCEDURE — 1159F MED LIST DOCD IN RCRD: CPT | Performed by: FAMILY MEDICINE

## 2024-09-13 PROCEDURE — 99213 OFFICE O/P EST LOW 20 MIN: CPT | Performed by: FAMILY MEDICINE

## 2024-09-13 PROCEDURE — 3008F BODY MASS INDEX DOCD: CPT | Performed by: FAMILY MEDICINE

## 2024-09-13 RX ORDER — DOXYCYCLINE 100 MG/1
100 CAPSULE ORAL 2 TIMES DAILY
Qty: 14 CAPSULE | Refills: 0 | Status: SHIPPED | OUTPATIENT
Start: 2024-09-13 | End: 2024-09-20

## 2024-09-13 RX ORDER — MUPIROCIN 20 MG/G
OINTMENT TOPICAL 3 TIMES DAILY
Qty: 22 G | Refills: 0 | Status: SHIPPED | OUTPATIENT
Start: 2024-09-13 | End: 2024-09-23

## 2024-09-13 NOTE — PROGRESS NOTES
Subjective   Patient ID: Verenice Sotelo is a 69 y.o. female who presents for Abrasion (Got scratched by her cat on Wednesday. Having surgery Monday was told to come get antibiotic ).  HPI  Got scratched by her cat 2 days ago  Scratched on right lower leg  No discharge  No bleeding  It is painful  Some pain  No fever, chills  Some redness in area    Using triple antibiotic cream on it    Current Outpatient Medications:     amLODIPine (Norvasc) 5 mg tablet, Take 1 tablet (5 mg) by mouth once daily., Disp: 90 tablet, Rfl: 3    aspirin 81 mg EC tablet, Take 1 tablet (81 mg) by mouth once daily at bedtime., Disp: , Rfl:     atorvastatin (Lipitor) 40 mg tablet, Take 1 tablet (40 mg) by mouth once daily. (Patient taking differently: Take 1 tablet (40 mg) by mouth once daily at bedtime.), Disp: 90 tablet, Rfl: 3    calcium carbonate-vitamin D3 600 mg-20 mcg (800 unit) tablet, Take 1 tablet by mouth 2 times a day., Disp: , Rfl:     hydroCHLOROthiazide (Microzide) 12.5 mg tablet, Take 1 tablet (12.5 mg) by mouth once daily., Disp: 90 tablet, Rfl: 3    levothyroxine (Synthroid, Levoxyl) 100 mcg tablet, Take 1 tablet (100 mcg) by mouth once daily. (Patient taking differently: Take 1 tablet (100 mcg) by mouth once daily at bedtime.), Disp: 90 tablet, Rfl: 3    meloxicam (Mobic) 7.5 mg tablet, Take 1 tablet (7.5 mg) by mouth once daily., Disp: 90 tablet, Rfl: 3    tamsulosin (Flomax) 0.4 mg 24 hr capsule, Take 3 days before surgery and 7 days after, Disp: 10 capsule, Rfl: 0    doxycycline (Vibramycin) 100 mg capsule, Take 1 capsule (100 mg) by mouth 2 times a day for 7 days., Disp: 14 capsule, Rfl: 0    multivitamin with minerals tablet, Take 2 tablets by mouth once daily., Disp: , Rfl:     mupirocin (Bactroban) 2 % ointment, Apply topically 3 times a day for 10 days. apply to affected area, Disp: 22 g, Rfl: 0    POTASSIUM GLUCONATE ORAL, Take 1 tablet by mouth once daily., Disp: , Rfl:    Past Surgical History:   Procedure  "Laterality Date    CATARACT EXTRACTION  06/11/2015    Cataract Surgery    TUBAL LIGATION  10/18/2013    Tubal Ligation      Past Medical History:   Diagnosis Date    Acute maxillary sinusitis, unspecified 10/26/2016    Acute maxillary sinusitis    Arthritis     Diverticulosis     Encounter for immunization 01/09/2020    Need for shingles vaccine    Encounter for immunization 09/19/2016    Need for shingles vaccine    Encounter for immunization 09/19/2016    Needs flu shot    Encounter for screening for other viral diseases 12/01/2017    Need for hepatitis C screening test    Female genital prolapse     Hyperlipidemia     Hypertension     Hypothyroidism     Midline cystocele     OAB (overactive bladder)     Osteopenia     Personal history of other specified conditions 04/23/2015    History of diarrhea    Raynaud's disease     Right carotid bruit     FRANCIS (stress urinary incontinence, female)     Wears glasses     Wears partial dentures     lower     Social History     Tobacco Use    Smoking status: Never    Smokeless tobacco: Never   Substance Use Topics    Alcohol use: Never    Drug use: Never      Family History   Problem Relation Name Age of Onset    Cervical cancer Mother      Prostate cancer Father      Diabetes type II Father      Aneurysm Sister          Ruptured Cerebral    Hyperlipidemia Brother      Diabetes type II Brother        Review of Systems    Objective   /60   Pulse 79   Ht 1.549 m (5' 1\")   Wt 72.6 kg (160 lb)   SpO2 97%   BMI 30.23 kg/m²    Physical Exam  Vitals and nursing note reviewed.   Constitutional:       General: She is not in acute distress.     Appearance: Normal appearance. She is not ill-appearing.   Cardiovascular:      Pulses: Normal pulses.   Musculoskeletal:         General: Normal range of motion.   Skin:     Capillary Refill: Capillary refill takes less than 2 seconds.      Comments: 2 large puncture wounds on right mid-calf  No discharge or bleeding  No surrounding " redness   Neurological:      General: No focal deficit present.      Mental Status: She is alert and oriented to person, place, and time.      Sensory: No sensory deficit.      Motor: No weakness.   Psychiatric:         Mood and Affect: Mood normal.         Behavior: Behavior normal.         Assessment/Plan   Problem List Items Addressed This Visit    None  Visit Diagnoses       Cellulitis of right lower extremity    -  Primary    Relevant Medications    doxycycline (Vibramycin) 100 mg capsule    mupirocin (Bactroban) 2 % ointment    Cat scratch of lower leg, right, initial encounter        Relevant Medications    mupirocin (Bactroban) 2 % ointment        Clean with soap and water  Watch for signs of infection    Patient understands and agrees with treatment plan    Froy Reyes, DO

## 2024-09-13 NOTE — TELEPHONE ENCOUNTER
"Pt has surgery scheduled with Dr. Johnson on Monday. Pt is concerned if she can proceed with surgery/not because her cat \"clawed\" her and patient is considered the area is infected. Nurse discussed with Dr. Johnson and he said ok to proceed with surgery however pt needs to go to urgent care. Nurse notified pt that she needs to go to urgent care, pt verbalized understanding.   "

## 2024-09-16 ENCOUNTER — ANESTHESIA (OUTPATIENT)
Dept: OPERATING ROOM | Facility: HOSPITAL | Age: 69
End: 2024-09-16
Payer: COMMERCIAL

## 2024-09-16 ENCOUNTER — HOSPITAL ENCOUNTER (OUTPATIENT)
Facility: HOSPITAL | Age: 69
Setting detail: OUTPATIENT SURGERY
Discharge: HOME | End: 2024-09-16
Attending: STUDENT IN AN ORGANIZED HEALTH CARE EDUCATION/TRAINING PROGRAM | Admitting: STUDENT IN AN ORGANIZED HEALTH CARE EDUCATION/TRAINING PROGRAM
Payer: COMMERCIAL

## 2024-09-16 VITALS
RESPIRATION RATE: 18 BRPM | WEIGHT: 157.41 LBS | TEMPERATURE: 96.8 F | HEART RATE: 65 BPM | HEIGHT: 61 IN | DIASTOLIC BLOOD PRESSURE: 58 MMHG | BODY MASS INDEX: 29.72 KG/M2 | OXYGEN SATURATION: 100 % | SYSTOLIC BLOOD PRESSURE: 112 MMHG

## 2024-09-16 DIAGNOSIS — N81.9 FEMALE GENITAL PROLAPSE, UNSPECIFIED TYPE: ICD-10-CM

## 2024-09-16 DIAGNOSIS — G89.18 POST-OP PAIN: ICD-10-CM

## 2024-09-16 DIAGNOSIS — N32.81 OAB (OVERACTIVE BLADDER): ICD-10-CM

## 2024-09-16 DIAGNOSIS — N39.3 SUI (STRESS URINARY INCONTINENCE, FEMALE): Primary | ICD-10-CM

## 2024-09-16 LAB
ERYTHROCYTE [DISTWIDTH] IN BLOOD BY AUTOMATED COUNT: 11.6 % (ref 11.5–14.5)
HCT VFR BLD AUTO: 37.7 % (ref 36–46)
HGB BLD-MCNC: 12.4 G/DL (ref 12–16)
MCH RBC QN AUTO: 30.6 PG (ref 26–34)
MCHC RBC AUTO-ENTMCNC: 32.9 G/DL (ref 32–36)
MCV RBC AUTO: 93 FL (ref 80–100)
NRBC BLD-RTO: 0 /100 WBCS (ref 0–0)
PLATELET # BLD AUTO: 210 X10*3/UL (ref 150–450)
RBC # BLD AUTO: 4.05 X10*6/UL (ref 4–5.2)
WBC # BLD AUTO: 11.7 X10*3/UL (ref 4.4–11.3)

## 2024-09-16 PROCEDURE — 2720000007 HC OR 272 NO HCPCS: Performed by: STUDENT IN AN ORGANIZED HEALTH CARE EDUCATION/TRAINING PROGRAM

## 2024-09-16 PROCEDURE — 2500000004 HC RX 250 GENERAL PHARMACY W/ HCPCS (ALT 636 FOR OP/ED): Performed by: ANESTHESIOLOGY

## 2024-09-16 PROCEDURE — 2500000001 HC RX 250 WO HCPCS SELF ADMINISTERED DRUGS (ALT 637 FOR MEDICARE OP): Performed by: STUDENT IN AN ORGANIZED HEALTH CARE EDUCATION/TRAINING PROGRAM

## 2024-09-16 PROCEDURE — 7100000010 HC PHASE TWO TIME - EACH INCREMENTAL 1 MINUTE: Performed by: STUDENT IN AN ORGANIZED HEALTH CARE EDUCATION/TRAINING PROGRAM

## 2024-09-16 PROCEDURE — 2780000003 HC OR 278 NO HCPCS: Performed by: STUDENT IN AN ORGANIZED HEALTH CARE EDUCATION/TRAINING PROGRAM

## 2024-09-16 PROCEDURE — A58542 PR LAP, SUPRACERVIAL HYSTERECTOMY W/ TUBE AND OV, <250G: Performed by: ANESTHESIOLOGY

## 2024-09-16 PROCEDURE — 3700000002 HC GENERAL ANESTHESIA TIME - EACH INCREMENTAL 1 MINUTE: Performed by: STUDENT IN AN ORGANIZED HEALTH CARE EDUCATION/TRAINING PROGRAM

## 2024-09-16 PROCEDURE — 7100000002 HC RECOVERY ROOM TIME - EACH INCREMENTAL 1 MINUTE: Performed by: STUDENT IN AN ORGANIZED HEALTH CARE EDUCATION/TRAINING PROGRAM

## 2024-09-16 PROCEDURE — 2500000005 HC RX 250 GENERAL PHARMACY W/O HCPCS: Performed by: STUDENT IN AN ORGANIZED HEALTH CARE EDUCATION/TRAINING PROGRAM

## 2024-09-16 PROCEDURE — 88305 TISSUE EXAM BY PATHOLOGIST: CPT | Mod: TC,GEALAB | Performed by: STUDENT IN AN ORGANIZED HEALTH CARE EDUCATION/TRAINING PROGRAM

## 2024-09-16 PROCEDURE — 2500000004 HC RX 250 GENERAL PHARMACY W/ HCPCS (ALT 636 FOR OP/ED): Performed by: STUDENT IN AN ORGANIZED HEALTH CARE EDUCATION/TRAINING PROGRAM

## 2024-09-16 PROCEDURE — 57425 LAPAROSCOPY SURG COLPOPEXY: CPT | Performed by: STUDENT IN AN ORGANIZED HEALTH CARE EDUCATION/TRAINING PROGRAM

## 2024-09-16 PROCEDURE — C1771 REP DEV, URINARY, W/SLING: HCPCS | Performed by: STUDENT IN AN ORGANIZED HEALTH CARE EDUCATION/TRAINING PROGRAM

## 2024-09-16 PROCEDURE — 57288 REPAIR BLADDER DEFECT: CPT | Performed by: STUDENT IN AN ORGANIZED HEALTH CARE EDUCATION/TRAINING PROGRAM

## 2024-09-16 PROCEDURE — 7100000009 HC PHASE TWO TIME - INITIAL BASE CHARGE: Performed by: STUDENT IN AN ORGANIZED HEALTH CARE EDUCATION/TRAINING PROGRAM

## 2024-09-16 PROCEDURE — 3600000004 HC OR TIME - INITIAL BASE CHARGE - PROCEDURE LEVEL FOUR: Performed by: STUDENT IN AN ORGANIZED HEALTH CARE EDUCATION/TRAINING PROGRAM

## 2024-09-16 PROCEDURE — 3700000001 HC GENERAL ANESTHESIA TIME - INITIAL BASE CHARGE: Performed by: STUDENT IN AN ORGANIZED HEALTH CARE EDUCATION/TRAINING PROGRAM

## 2024-09-16 PROCEDURE — 7100000001 HC RECOVERY ROOM TIME - INITIAL BASE CHARGE: Performed by: STUDENT IN AN ORGANIZED HEALTH CARE EDUCATION/TRAINING PROGRAM

## 2024-09-16 PROCEDURE — 36415 COLL VENOUS BLD VENIPUNCTURE: CPT | Performed by: ANESTHESIOLOGY

## 2024-09-16 PROCEDURE — 2500000005 HC RX 250 GENERAL PHARMACY W/O HCPCS

## 2024-09-16 PROCEDURE — 3600000009 HC OR TIME - EACH INCREMENTAL 1 MINUTE - PROCEDURE LEVEL FOUR: Performed by: STUDENT IN AN ORGANIZED HEALTH CARE EDUCATION/TRAINING PROGRAM

## 2024-09-16 PROCEDURE — 85027 COMPLETE CBC AUTOMATED: CPT | Performed by: ANESTHESIOLOGY

## 2024-09-16 PROCEDURE — 58542 LSH W/T/O UT 250 G OR LESS: CPT | Performed by: STUDENT IN AN ORGANIZED HEALTH CARE EDUCATION/TRAINING PROGRAM

## 2024-09-16 PROCEDURE — P9045 ALBUMIN (HUMAN), 5%, 250 ML: HCPCS | Mod: JZ | Performed by: ANESTHESIOLOGY

## 2024-09-16 PROCEDURE — A58542 PR LAP, SUPRACERVIAL HYSTERECTOMY W/ TUBE AND OV, <250G: Performed by: NURSE ANESTHETIST, CERTIFIED REGISTERED

## 2024-09-16 PROCEDURE — C1781 MESH (IMPLANTABLE): HCPCS | Performed by: STUDENT IN AN ORGANIZED HEALTH CARE EDUCATION/TRAINING PROGRAM

## 2024-09-16 PROCEDURE — 2500000004 HC RX 250 GENERAL PHARMACY W/ HCPCS (ALT 636 FOR OP/ED)

## 2024-09-16 DEVICE — MESH, Y, VERTESSA LITE 26 X 4 X 3CM: Type: IMPLANTABLE DEVICE | Site: BLADDER | Status: FUNCTIONAL

## 2024-09-16 RX ORDER — MIDAZOLAM HYDROCHLORIDE 1 MG/ML
INJECTION INTRAMUSCULAR; INTRAVENOUS AS NEEDED
Status: DISCONTINUED | OUTPATIENT
Start: 2024-09-16 | End: 2024-09-16

## 2024-09-16 RX ORDER — ACETAMINOPHEN 325 MG/1
975 TABLET ORAL ONCE
Status: COMPLETED | OUTPATIENT
Start: 2024-09-16 | End: 2024-09-16

## 2024-09-16 RX ORDER — ADHESIVE BANDAGE
15 BANDAGE TOPICAL DAILY PRN
Qty: 360 ML | Refills: 0 | Status: SHIPPED | OUTPATIENT
Start: 2024-09-16

## 2024-09-16 RX ORDER — ALBUTEROL SULFATE 0.83 MG/ML
2.5 SOLUTION RESPIRATORY (INHALATION) ONCE AS NEEDED
Status: DISCONTINUED | OUTPATIENT
Start: 2024-09-16 | End: 2024-09-16 | Stop reason: HOSPADM

## 2024-09-16 RX ORDER — CEFAZOLIN 1 G/1
INJECTION, POWDER, FOR SOLUTION INTRAVENOUS AS NEEDED
Status: DISCONTINUED | OUTPATIENT
Start: 2024-09-16 | End: 2024-09-16

## 2024-09-16 RX ORDER — ALBUMIN HUMAN 50 G/1000ML
12.5 SOLUTION INTRAVENOUS ONCE
Status: COMPLETED | OUTPATIENT
Start: 2024-09-16 | End: 2024-09-16

## 2024-09-16 RX ORDER — ONDANSETRON HYDROCHLORIDE 2 MG/ML
8 INJECTION, SOLUTION INTRAVENOUS ONCE
Status: DISCONTINUED | OUTPATIENT
Start: 2024-09-16 | End: 2024-09-16 | Stop reason: HOSPADM

## 2024-09-16 RX ORDER — KETOROLAC TROMETHAMINE 15 MG/ML
15 INJECTION, SOLUTION INTRAMUSCULAR; INTRAVENOUS ONCE
Status: COMPLETED | OUTPATIENT
Start: 2024-09-16 | End: 2024-09-16

## 2024-09-16 RX ORDER — SODIUM CHLORIDE, SODIUM LACTATE, POTASSIUM CHLORIDE, CALCIUM CHLORIDE 600; 310; 30; 20 MG/100ML; MG/100ML; MG/100ML; MG/100ML
100 INJECTION, SOLUTION INTRAVENOUS CONTINUOUS
Status: DISCONTINUED | OUTPATIENT
Start: 2024-09-16 | End: 2024-09-16 | Stop reason: HOSPADM

## 2024-09-16 RX ORDER — POLYETHYLENE GLYCOL 3350 17 G/17G
17 POWDER, FOR SOLUTION ORAL DAILY
Qty: 30 PACKET | Refills: 0 | Status: SHIPPED | OUTPATIENT
Start: 2024-09-16 | End: 2024-09-17

## 2024-09-16 RX ORDER — PROPOFOL 10 MG/ML
INJECTION, EMULSION INTRAVENOUS AS NEEDED
Status: DISCONTINUED | OUTPATIENT
Start: 2024-09-16 | End: 2024-09-16

## 2024-09-16 RX ORDER — DOCUSATE SODIUM 100 MG/1
100 CAPSULE, LIQUID FILLED ORAL 2 TIMES DAILY
Qty: 10 CAPSULE | Refills: 0 | Status: SHIPPED | OUTPATIENT
Start: 2024-09-16 | End: 2024-09-21

## 2024-09-16 RX ORDER — PHENAZOPYRIDINE HYDROCHLORIDE 100 MG/1
200 TABLET, FILM COATED ORAL ONCE
Status: COMPLETED | OUTPATIENT
Start: 2024-09-16 | End: 2024-09-16

## 2024-09-16 RX ORDER — ONDANSETRON HYDROCHLORIDE 2 MG/ML
INJECTION, SOLUTION INTRAVENOUS AS NEEDED
Status: DISCONTINUED | OUTPATIENT
Start: 2024-09-16 | End: 2024-09-16

## 2024-09-16 RX ORDER — HYDROMORPHONE HYDROCHLORIDE 2 MG/ML
INJECTION, SOLUTION INTRAMUSCULAR; INTRAVENOUS; SUBCUTANEOUS AS NEEDED
Status: DISCONTINUED | OUTPATIENT
Start: 2024-09-16 | End: 2024-09-16

## 2024-09-16 RX ORDER — GABAPENTIN 300 MG/1
300 CAPSULE ORAL ONCE
Status: COMPLETED | OUTPATIENT
Start: 2024-09-16 | End: 2024-09-16

## 2024-09-16 RX ORDER — LIDOCAINE HYDROCHLORIDE 20 MG/ML
INJECTION, SOLUTION INFILTRATION; PERINEURAL AS NEEDED
Status: DISCONTINUED | OUTPATIENT
Start: 2024-09-16 | End: 2024-09-16

## 2024-09-16 RX ORDER — KETOROLAC TROMETHAMINE 10 MG/1
10 TABLET, FILM COATED ORAL EVERY 6 HOURS PRN
Qty: 20 TABLET | Refills: 0 | Status: SHIPPED | OUTPATIENT
Start: 2024-09-16 | End: 2024-09-21

## 2024-09-16 RX ORDER — CELECOXIB 100 MG/1
200 CAPSULE ORAL ONCE
Status: DISCONTINUED | OUTPATIENT
Start: 2024-09-16 | End: 2024-09-16 | Stop reason: HOSPADM

## 2024-09-16 RX ORDER — LIDOCAINE HYDROCHLORIDE AND EPINEPHRINE 10; 10 MG/ML; UG/ML
INJECTION, SOLUTION INFILTRATION; PERINEURAL AS NEEDED
Status: DISCONTINUED | OUTPATIENT
Start: 2024-09-16 | End: 2024-09-16 | Stop reason: HOSPADM

## 2024-09-16 RX ORDER — ACETAMINOPHEN 500 MG
1000 TABLET ORAL EVERY 6 HOURS PRN
Qty: 20 TABLET | Refills: 0 | Status: SHIPPED | OUTPATIENT
Start: 2024-09-16 | End: 2024-09-27

## 2024-09-16 RX ORDER — BUPIVACAINE HYDROCHLORIDE 5 MG/ML
INJECTION, SOLUTION PERINEURAL AS NEEDED
Status: DISCONTINUED | OUTPATIENT
Start: 2024-09-16 | End: 2024-09-16 | Stop reason: HOSPADM

## 2024-09-16 RX ORDER — ACETAMINOPHEN 325 MG/1
650 TABLET ORAL EVERY 4 HOURS PRN
Status: DISCONTINUED | OUTPATIENT
Start: 2024-09-16 | End: 2024-09-16 | Stop reason: HOSPADM

## 2024-09-16 RX ORDER — ROCURONIUM BROMIDE 10 MG/ML
INJECTION, SOLUTION INTRAVENOUS AS NEEDED
Status: DISCONTINUED | OUTPATIENT
Start: 2024-09-16 | End: 2024-09-16

## 2024-09-16 RX ORDER — CEFAZOLIN SODIUM 2 G/100ML
2 INJECTION, SOLUTION INTRAVENOUS ONCE
Status: DISCONTINUED | OUTPATIENT
Start: 2024-09-16 | End: 2024-09-16 | Stop reason: HOSPADM

## 2024-09-16 RX ORDER — PHENYLEPHRINE HCL IN 0.9% NACL 0.4MG/10ML
SYRINGE (ML) INTRAVENOUS AS NEEDED
Status: DISCONTINUED | OUTPATIENT
Start: 2024-09-16 | End: 2024-09-16

## 2024-09-16 RX ORDER — TRAMADOL HYDROCHLORIDE 50 MG/1
50 TABLET ORAL EVERY 6 HOURS PRN
Qty: 20 TABLET | Refills: 0 | Status: SHIPPED | OUTPATIENT
Start: 2024-09-16 | End: 2024-09-21

## 2024-09-16 RX ORDER — FENTANYL CITRATE 50 UG/ML
INJECTION, SOLUTION INTRAMUSCULAR; INTRAVENOUS AS NEEDED
Status: DISCONTINUED | OUTPATIENT
Start: 2024-09-16 | End: 2024-09-16

## 2024-09-16 SDOH — HEALTH STABILITY: MENTAL HEALTH: CURRENT SMOKER: 0

## 2024-09-16 ASSESSMENT — PAIN SCALES - GENERAL
PAINLEVEL_OUTOF10: 0 - NO PAIN
PAINLEVEL_OUTOF10: 2
PAINLEVEL_OUTOF10: 3
PAINLEVEL_OUTOF10: 5 - MODERATE PAIN
PAINLEVEL_OUTOF10: 3
PAINLEVEL_OUTOF10: 3
PAINLEVEL_OUTOF10: 2
PAINLEVEL_OUTOF10: 3
PAINLEVEL_OUTOF10: 0 - NO PAIN
PAINLEVEL_OUTOF10: 3
PAIN_LEVEL: 2
PAINLEVEL_OUTOF10: 3
PAINLEVEL_OUTOF10: 2

## 2024-09-16 ASSESSMENT — PAIN - FUNCTIONAL ASSESSMENT: PAIN_FUNCTIONAL_ASSESSMENT: 0-10

## 2024-09-16 NOTE — ANESTHESIA PREPROCEDURE EVALUATION
Patient: Verenice Sotelo    Procedure Information       Date/Time: 09/16/24 0725    Procedures:       DESARA SLING PLACEMENT      LAPAROSCOPIC SUPRACERVICAL HYSTERECTOMY      SACROCOLPOPEXY    Location: [facility] / [facility]    Surgeons: Ruben Johnson MD            Relevant Problems   Cardiac   (+) Benign essential hypertension   (+) Familial hypercholesteremia   (+) Hyperlipidemia      Endocrine   (+) Hypothyroidism       Clinical information reviewed:    Allergies  Meds               NPO Detail:  NPO/Void Status  Date of Last Liquid: 09/16/24  Time of Last Liquid: 0530  Date of Last Solid: 09/15/24  Time of Last Solid: 1900         Physical Exam    Airway  Mallampati: II     Cardiovascular - normal exam     Dental    Pulmonary - normal exam     Abdominal - normal exam           Anesthesia Plan    History of general anesthesia?: yes  History of complications of general anesthesia?: no    ASA 2     The patient is not a current smoker.    intravenous induction   Anesthetic plan and risks discussed with patient.  Use of blood products discussed with patient who.    Plan discussed with CRNA and attending.

## 2024-09-16 NOTE — ANESTHESIA PROCEDURE NOTES
Airway  Date/Time: 9/16/2024 8:02 AM  Urgency: elective      Staffing  Performed: SRNA   Authorized by: Kostas Rowe MD    Performed by: Jeffery Mosher  Patient location during procedure: OR    Indications and Patient Condition  Indications for airway management: anesthesia and airway protection  Spontaneous Ventilation: absent  Sedation level: deep  Preoxygenated: yes  Patient position: sniffing  MILS maintained throughout  Mask difficulty assessment: 1 - vent by mask  Planned trial extubation    Final Airway Details  Final airway type: endotracheal airway      Successful airway: ETT  Cuffed: yes   Successful intubation technique: video laryngoscopy  Facilitating devices/methods: intubating stylet  Endotracheal tube insertion site: oral  Blade: Lisette  Blade size: #3  ETT size (mm): 7.0  Cormack-Lehane Classification: grade I - full view of glottis  Placement verified by: chest auscultation and capnometry   Measured from: lips  ETT to lips (cm): 22  Number of attempts at approach: 1

## 2024-09-16 NOTE — OP NOTE
DESARA SLING PLACEMENT, LAPAROSCOPIC SUPRACERVICAL HYSTERECTOMY, SACROCOLPOPEXY, Cystoscopy Rigid Operative Note     Date: 2024  OR Location: [facility]    Name: Verenice Sotelo, : 1955, Age: 69 y.o., MRN: [id number], Sex: female    Diagnosis  Pre-op Diagnosis      * FRANCIS (stress urinary incontinence, female) [N39.3]     * OAB (overactive bladder) [N32.81]     * Female genital prolapse, unspecified type [N81.9] Post-op Diagnosis     * FRANCIS (stress urinary incontinence, female) [N39.3]     * OAB (overactive bladder) [N32.81]     * Female genital prolapse, unspecified type [N81.9]     Procedures  DESARA SLING PLACEMENT  73813 - [place] SLING OPERATION STRESS INCONTINENCE    LAPAROSCOPIC SUPRACERVICAL HYSTERECTOMY  09968 - [place] LAPAROSCOPY SUPRACERVICAL HYSTERECTOMY 250 GM/<    SACROCOLPOPEXY  51895 - [place] LAPAROSCOPY COLPOPEXY SUSPENSION VAGINAL APEX    Cystoscopy Rigid  35031 - MA CYSTOURETHROSCOPY      Surgeons      * Ruben Johnson - Primary    Resident/Fellow/Other Assistant:  Surgeons and Role:     * Sameera Jaime PA-C - [facility] Assist    Procedure Summary  Anesthesia: Anesthesia type not filed in the log.  ASA: ASA status not filed in the log.  Anesthesia Staff: Anesthesiologist: Kostas Rowe MD  CRNA: BALAJI Villa-KENYA  SRNA: Jeffery Mosher  Estimated Blood Loss: 20 mL  Intra-op Medications:   Administrations occurring from 0725 to 1125 on 24:   Medication Name Total Dose   BUPivacaine HCl (Marcaine) 0.5 % (5 mg/mL) injection 28 mL   lidocaine-epinephrine (Xylocaine W/EPI) 1 %-1:100,000 injection 10 mL              Anesthesia Record               Intraprocedure I/O Totals          Output    Total Blood Loss - Surgical Delivery (mL) 25 mL    Total Output 25 mL       Other (could not be determined as input or output)    Surgical Delivery Estimated Blood Loss (mL) 25          Specimen:   ID Type Source Tests Collected by Time   1 : UTERUS WITHOUT CERVIX, FALLOPIAN TUBES AND OVARIES BILATERAL  Tissue UTERUS WITHOUT CERVIX, FALLOPIAN TUBES AND OVARIES BILATERAL SURGICAL PATHOLOGY EXAM Ruben Johnson MD 9/16/2024 1058        Staff:   Circulator: Christina  Circulator: Karlee Fungub Person: Anayeli Fungub Person: Mario Fungub Person: Vasu Barbosa Circulator: Corrina         Drains and/or Catheters:   Urethral Catheter Non-latex 16 Fr. (Active)       Tourniquet Times:         Implants:  Implants       Type Name Action Serial No.      Surgical Mesh Sling Implant MESH, Y, VERTESSA LITE 26 X 4 X 3CM - [id number] Implanted               Findings:  stage 3 uterovginal prolapse    Indications: Verenice Sotelo is an 69 y.o. female who is having surgery for FRANCIS (stress urinary incontinence, female) [N39.3]  OAB (overactive bladder) [N32.81]  Female genital prolapse, unspecified type [N81.9].     The patient was seen in the preoperative area. The risks, benefits, complications, treatment options, non-operative alternatives, expected recovery and outcomes were discussed with the patient. The possibilities of reaction to medication, pulmonary aspiration, injury to surrounding structures, bleeding, recurrent infection, the need for additional procedures, failure to diagnose a condition, and creating a complication requiring transfusion or operation were discussed with the patient. The patient concurred with the proposed plan, giving informed consent.  The site of surgery was properly noted/marked if necessary per policy. The patient has been actively warmed in preoperative area. Preoperative antibiotics have been ordered and given within 1 hours of incision. Venous thrombosis prophylaxis have been ordered including bilateral sequential compression devices    Procedure Details:             Patient was taken to the operating room, prepped  and draped in usual sterile fashion after being placed in dorsal lithotomy. A Teresa catheter was placed in the bladder, and an Vcare uterine manipulator was placed in the uterine.. We  then began the procedure by entering the abdomen  through the umbilicus using Nicolasa technique, the abdomen  was insufflated with carbon dioxide gas to 15 mmHg of carbon dioxide gas then the 12 mm balloon port was placed.  Then under direct visualization we placed 5 mm ports in the right and left lower quadrants and a 12 mm port in the midline 3 cm above the puic bone.  Using the bipolar cauter electrosurgical device, the right IP ligament was secured and ligated, then sequential bites were taken through the broad ligament to the round ligament was then secured and ligated. The broad ligament was then opened and dissected anteriorly to create a bladder flap as well as posteriorly. The same was done on the left side, and this was carried down to the level of the uterine vessels. The uterine vessels were then skeletonized, clamped, coagulated and then transected using voyant and the bipolar and scissors. Hemostasis was achieved with the bipolar cautery device..  The bladder was  then dissected off the anterior vaginal wall and cervix in order to  create site for mesh attachment.  The same procedure was then done on the  opposite side.  The uterus was amputated at the level of the internal  cervical os and placed in the right paracolic gutter.  The cervical stump and endocervical canal were cauterized. The stump  was then imbricated with serosa using interrupted 0-vicryl stitches  We then  turned our attention to the presacral dissection.  The sigmoid was swept  out of the pelvis and held out of the way with an endo loop stitch on the epipoloica.  The sacral promontory was identified.  The  peritoneum over the sacral promontory was grasped and entered sharply.  Careful sharp and blunt dissection were then used to develop the  presacral ligament plane.  The middle sacral artery was identified,  cauterized, and transected.  The peritoneum was then dissected in a  caudal fashion toward the right uterosacral ligament in  order to create  a space to peritonealize the mesh.  We then placed the Vertesa Light mesh into  the abdomen and attached at the anterior and posterior cervical stumps  and vagina using multiple interrupted 0 PDS sutures.  The vaginal apex  was then tensioned appropriately with the C-point 8 cm proximal to the  hymen and the mesh was attached to the sacral ligament using 2  interrupted 0 Ethibond sutures.  The peritoneum was then closed entirely  over the mesh using a running 0 Vicryl stitch.  Once this was completed,  cystoscopy was performed and spill of urine was noted bilaterally from the ureteral orifices.  The bladder was then fully evaluated and found to not have any abnormalities The bladder was drained and the Teresa was  replaced.  We then extended the umbilical incision and placed an Candy  retractor through the abdomen and brought the uterine specimen to the  retractor and then proceeded to remove it without difficulty.  Once this was completed, the fascia was closed using  interrupted figure-of-eight 0 Vicryl sutures.   The skin incisions were all closed using a running 4-0  Vicryl.    Two sites were identified with each site 2.5 cm. from the midline at the level of the symphysis pubis. 1% lidocaine with epinephrine was injected vaginally under the urethra in the vaginal epithelium and bilaterally in the direction of two periurethral tunnels that were about to be created for the sling.  At this point, a 2 cm. sagittal excision was performed vaginally, beginning 1 cm. beneath the urethral meatus. Sharp dissection was carried out bilaterally using Metzenbaum scissors and periurethral tunnels were created bilaterally.  A Teresa catheter with a catheter guide was inserted into the bladder.   The TVT trocars were passed vaginally and retropubically with bladder deviation to the opposite side until it appeared suprapubically through a stab incision. The catheter was removed.  A cystoscopy was performed, which  was entirely normal.  The bladder was drained.   The trocars were brought up entirely suprapubically and excised.  The end of the mesh was held in place with a Jillian clamp.   The end of the mesh was held in place with a Jillian clamp. Tension was adjusted on the mesh by drawing up on the suprapubic ends with a #8 Hegar dilator maintained between the tape and the urethra. The plastic sheaths were removed bilaterally.  The excess mesh was excised suprapubically.  The suprapubic sites were closed with interrupted stitches of 4-0 Vicryl suture.  The vaginal incision was closed with a running stitch of 0 Vicryl suture.      Complications:  None; patient tolerated the procedure well.    Disposition: PACU - hemodynamically stable.  Condition: stable         Additional Details:     Attending Attestation: I was present and scrubbed for the entire procedure.    Ruben Johnson  Phone Number: [phone]

## 2024-09-16 NOTE — DISCHARGE INSTRUCTIONS
Call Dr. Johnson for any problems and/or concerns     *Walk as much as possible, it is ok to use stairs carefully  *Ok to shower, avoid soaking in tub baths or swimming for 6 weeks after surgery   *Continue the coughing and deep breathing exercises that your learned in the hospital  *No lifting/straining and avoid constipation for 6 weeks (Avoid strenuous activity)  *Prevent constipation by using stool softeners and staying hydrated, so that you do not strain against your stitches or have pain from constipation  *Vaginal rest for 6 weeks (Do not put anything in your vagina except prescribed vaginal estrogen. Do not use tampons or douches. Do not have sex until cleared by physician)     Call Doctor right away for:     *Fever above 100.4/shaking chills  *Bright red vaginal bleeding or bleeding that soaks more than one sanitary pad per hour  *A foul smelling discharge from the vagina  *Trouble urinating or burning with urination  *Severe pain or bloating in your abdomen  *Persistent nausea/vomiting  *Redness, swelling, or drainage at your incision sites  *Chest pain/shortness of breath-call 911.     - You will be going home with prescriptions for pain medication. If you are able to take them, alternate a dose of Acetaminophen (Tylenol) and Ketorolac (Toradol) every 3 hours. (For example, 1000mg of Tylenol at 09:00am, 10mg Toradol at 12:00pm, 1000mg of Tylenol at 3:00pm, 10mg Toradol at 6:00pm).   - Use any prescribed narcotic (ie Tramadol) for breakthrough pain as prescribed. Use a stool softener, such as Miralax, to prevent constipation from the narcotic medication.

## 2024-09-17 RX ORDER — POLYETHYLENE GLYCOL 3350 17 G/17G
17 POWDER, FOR SOLUTION ORAL DAILY
Qty: 510 G | Refills: 0 | Status: SHIPPED | OUTPATIENT
Start: 2024-09-17

## 2024-09-19 DIAGNOSIS — N95.8 GENITOURINARY SYNDROME OF MENOPAUSE: Primary | ICD-10-CM

## 2024-09-19 RX ORDER — ESTRADIOL 0.1 MG/G
2 CREAM VAGINAL DAILY
Qty: 42.5 G | Refills: 12 | Status: SHIPPED | OUTPATIENT
Start: 2024-09-19 | End: 2025-09-19

## 2024-09-21 DIAGNOSIS — N32.81 OAB (OVERACTIVE BLADDER): Primary | ICD-10-CM

## 2024-09-23 ENCOUNTER — OFFICE VISIT (OUTPATIENT)
Dept: UROLOGY | Facility: CLINIC | Age: 69
End: 2024-09-23
Payer: COMMERCIAL

## 2024-09-23 DIAGNOSIS — Z09 POSTOP CHECK: Primary | ICD-10-CM

## 2024-09-23 PROCEDURE — 99024 POSTOP FOLLOW-UP VISIT: CPT | Performed by: STUDENT IN AN ORGANIZED HEALTH CARE EDUCATION/TRAINING PROGRAM

## 2024-09-23 NOTE — PROGRESS NOTES
HISTORY OF PRESENT ILLNESS:  Verenice Sotelo is a 69 y.o. female who presents today status post sling on 9/16/24. She reports she has something bothering her in her pelvic area. Other than that, she is doing well. She does have bowel movements, but they are getting smaller and less frequent. She asks if smooth move tea at night would be good.          Past Medical History  She has a past medical history of Acute maxillary sinusitis, unspecified (10/26/2016), Arthritis, Diverticulosis, Encounter for immunization (01/09/2020), Encounter for immunization (09/19/2016), Encounter for immunization (09/19/2016), Encounter for screening for other viral diseases (12/01/2017), Female genital prolapse, Hyperlipidemia, Hypertension, Hypothyroidism, Midline cystocele, OAB (overactive bladder), Osteopenia, Personal history of other specified conditions (04/23/2015), Raynaud's disease, Right carotid bruit, FRANCIS (stress urinary incontinence, female), Wears glasses, and Wears partial dentures.    Surgical History  She has a past surgical history that includes Tubal ligation (10/18/2013); Cataract extraction (06/11/2015); Hysterectomy (N/A, 09/16/2024); Urethral sling (N/A, 09/16/2024); and Sacrocolpopexy (N/A, 09/16/2024).     Social History  She reports that she has never smoked. She has never used smokeless tobacco. She reports that she does not drink alcohol and does not use drugs.    Family History  Family History   Problem Relation Name Age of Onset    Cervical cancer Mother      Prostate cancer Father      Diabetes type II Father      Aneurysm Sister          Ruptured Cerebral    Hyperlipidemia Brother      Diabetes type II Brother          Allergies  Lisinopril and Sulfa (sulfonamide antibiotics)      A comprehensive 10+ review of systems was negative except for: see hpi                          PHYSICAL EXAMINATION:  BP Readings from Last 3 Encounters:   09/16/24 112/58   09/13/24 124/60   08/30/24 149/83      Wt Readings  "from Last 3 Encounters:   09/16/24 71.4 kg (157 lb 6.5 oz)   09/13/24 72.6 kg (160 lb)   08/30/24 72.6 kg (160 lb 0.9 oz)      BMI: Estimated body mass index is 29.74 kg/m² as calculated from the following:    Height as of 9/16/24: 1.549 m (5' 1\").    Weight as of 9/16/24: 71.4 kg (157 lb 6.5 oz).  BSA: Estimated body surface area is 1.75 meters squared as calculated from the following:    Height as of 9/16/24: 1.549 m (5' 1\").    Weight as of 9/16/24: 71.4 kg (157 lb 6.5 oz).  HEENT: Normocephalic, atraumatic, PER EOMI, nonicteric, trachea normal, thyroid normal, oropharynx normal.  CARDIAC: regular rate & rhythm, S1 & S2 normal.  No heaves, thrills, gallops or murmurs.  LUNGS: Clear to auscultation, no spinal or CV tenderness.  EXTREMITIES: No evidence of cyanosis, clubbing or edema.    Patient has a suprapubic hematoma without any overlying bruising, there is no extension into the vaginal wall           Assessment:  Verenice Sotelo is a 69 y.o. who presents with stage II uterovaginal prolapse and mixed incontinence     POP:   status post laparoscopic supracervical hysterectomy, sacrocolpopexy, sling 9/16/2024    Has a retropubic hematoma but otherwise doing well       MADIE   urgency improved   She does have a positive CST  Status post sling 9/16/2024 complicated by suprapubic hematoma  Recommend rest, ice and pain relief with Tylenol  as needed      Follow up post op       All questions and concerns were answered and addressed.  The patient expressed understanding and agrees with the plan.     Ruben Johnson MD    Scribe Attestation  By signing my name below, I, Mayuri Plata   attest that this documentation has been prepared under the direction and in the presence of Ruben Johnson MD.  "

## 2024-09-24 LAB
LABORATORY COMMENT REPORT: NORMAL
PATH REPORT.COMMENTS IMP SPEC: NORMAL
PATH REPORT.FINAL DX SPEC: NORMAL
PATH REPORT.GROSS SPEC: NORMAL
PATH REPORT.RELEVANT HX SPEC: NORMAL
PATH REPORT.TOTAL CANCER: NORMAL

## 2024-09-27 ENCOUNTER — APPOINTMENT (OUTPATIENT)
Dept: RADIOLOGY | Facility: HOSPITAL | Age: 69
DRG: 699 | End: 2024-09-27
Payer: COMMERCIAL

## 2024-09-27 ENCOUNTER — HOSPITAL ENCOUNTER (INPATIENT)
Facility: HOSPITAL | Age: 69
LOS: 2 days | Discharge: ANOTHER HOSPITAL | DRG: 699 | End: 2024-09-29
Attending: STUDENT IN AN ORGANIZED HEALTH CARE EDUCATION/TRAINING PROGRAM | Admitting: STUDENT IN AN ORGANIZED HEALTH CARE EDUCATION/TRAINING PROGRAM
Payer: COMMERCIAL

## 2024-09-27 ENCOUNTER — ANESTHESIA (OUTPATIENT)
Dept: OPERATING ROOM | Facility: HOSPITAL | Age: 69
End: 2024-09-27
Payer: COMMERCIAL

## 2024-09-27 ENCOUNTER — ANESTHESIA EVENT (OUTPATIENT)
Dept: OPERATING ROOM | Facility: HOSPITAL | Age: 69
End: 2024-09-27
Payer: COMMERCIAL

## 2024-09-27 DIAGNOSIS — N81.11 MIDLINE CYSTOCELE: ICD-10-CM

## 2024-09-27 DIAGNOSIS — N17.9 AKI (ACUTE KIDNEY INJURY) (CMS-HCC): Primary | ICD-10-CM

## 2024-09-27 DIAGNOSIS — N39.3 SUI (STRESS URINARY INCONTINENCE, FEMALE): ICD-10-CM

## 2024-09-27 DIAGNOSIS — E78.2 MIXED HYPERLIPIDEMIA: ICD-10-CM

## 2024-09-27 DIAGNOSIS — I73.00 RAYNAUD'S DISEASE WITHOUT GANGRENE: ICD-10-CM

## 2024-09-27 DIAGNOSIS — E78.01 FAMILIAL HYPERCHOLESTEREMIA: ICD-10-CM

## 2024-09-27 DIAGNOSIS — N32.81 OAB (OVERACTIVE BLADDER): ICD-10-CM

## 2024-09-27 DIAGNOSIS — R09.89 BRUIT OF RIGHT CAROTID ARTERY: ICD-10-CM

## 2024-09-27 DIAGNOSIS — M17.11 ARTHRITIS OF KNEE, RIGHT: ICD-10-CM

## 2024-09-27 DIAGNOSIS — S37.20XA INJURY OF BLADDER, INITIAL ENCOUNTER: ICD-10-CM

## 2024-09-27 DIAGNOSIS — I10 BENIGN ESSENTIAL HYPERTENSION: ICD-10-CM

## 2024-09-27 LAB
ALBUMIN SERPL BCP-MCNC: 3.7 G/DL (ref 3.4–5)
ALP SERPL-CCNC: 89 U/L (ref 33–136)
ALT SERPL W P-5'-P-CCNC: 15 U/L (ref 7–45)
ANION GAP SERPL CALC-SCNC: 12 MMOL/L (ref 10–20)
APPEARANCE UR: CLEAR
AST SERPL W P-5'-P-CCNC: 18 U/L (ref 9–39)
BASOPHILS # BLD AUTO: 0.04 X10*3/UL (ref 0–0.1)
BASOPHILS NFR BLD AUTO: 0.5 %
BILIRUB SERPL-MCNC: 0.4 MG/DL (ref 0–1.2)
BILIRUB UR STRIP.AUTO-MCNC: NEGATIVE MG/DL
BUN SERPL-MCNC: 42 MG/DL (ref 6–23)
CALCIUM SERPL-MCNC: 9 MG/DL (ref 8.6–10.3)
CHLORIDE SERPL-SCNC: 103 MMOL/L (ref 98–107)
CO2 SERPL-SCNC: 25 MMOL/L (ref 21–32)
COLOR UR: COLORLESS
CREAT SERPL-MCNC: 2.75 MG/DL (ref 0.5–1.05)
EGFRCR SERPLBLD CKD-EPI 2021: 18 ML/MIN/1.73M*2
EOSINOPHIL # BLD AUTO: 0.12 X10*3/UL (ref 0–0.7)
EOSINOPHIL NFR BLD AUTO: 1.4 %
ERYTHROCYTE [DISTWIDTH] IN BLOOD BY AUTOMATED COUNT: 11.9 % (ref 11.5–14.5)
GLUCOSE SERPL-MCNC: 88 MG/DL (ref 74–99)
GLUCOSE UR STRIP.AUTO-MCNC: NORMAL MG/DL
HCT VFR BLD AUTO: 36.9 % (ref 36–46)
HGB BLD-MCNC: 12.3 G/DL (ref 12–16)
IMM GRANULOCYTES # BLD AUTO: 0.03 X10*3/UL (ref 0–0.7)
IMM GRANULOCYTES NFR BLD AUTO: 0.4 % (ref 0–0.9)
INR PPP: 1 (ref 0.9–1.1)
KETONES UR STRIP.AUTO-MCNC: NEGATIVE MG/DL
LACTATE SERPL-SCNC: 0.7 MMOL/L (ref 0.4–2)
LEUKOCYTE ESTERASE UR QL STRIP.AUTO: ABNORMAL
LYMPHOCYTES # BLD AUTO: 1.79 X10*3/UL (ref 1.2–4.8)
LYMPHOCYTES NFR BLD AUTO: 21 %
MCH RBC QN AUTO: 30.4 PG (ref 26–34)
MCHC RBC AUTO-ENTMCNC: 33.3 G/DL (ref 32–36)
MCV RBC AUTO: 91 FL (ref 80–100)
MONOCYTES # BLD AUTO: 0.52 X10*3/UL (ref 0.1–1)
MONOCYTES NFR BLD AUTO: 6.1 %
NEUTROPHILS # BLD AUTO: 6.03 X10*3/UL (ref 1.2–7.7)
NEUTROPHILS NFR BLD AUTO: 70.6 %
NITRITE UR QL STRIP.AUTO: NEGATIVE
NRBC BLD-RTO: 0 /100 WBCS (ref 0–0)
PH UR STRIP.AUTO: 6 [PH]
PLATELET # BLD AUTO: 321 X10*3/UL (ref 150–450)
POTASSIUM SERPL-SCNC: 4.4 MMOL/L (ref 3.5–5.3)
PROT SERPL-MCNC: 6.6 G/DL (ref 6.4–8.2)
PROT UR STRIP.AUTO-MCNC: NEGATIVE MG/DL
PROTHROMBIN TIME: 11.7 SECONDS (ref 9.8–12.8)
RBC # BLD AUTO: 4.04 X10*6/UL (ref 4–5.2)
RBC # UR STRIP.AUTO: NEGATIVE /UL
RBC #/AREA URNS AUTO: ABNORMAL /HPF
RBC MORPH BLD: NORMAL
SODIUM SERPL-SCNC: 136 MMOL/L (ref 136–145)
SP GR UR STRIP.AUTO: 1
SQUAMOUS #/AREA URNS AUTO: ABNORMAL /HPF
UROBILINOGEN UR STRIP.AUTO-MCNC: NORMAL MG/DL
WBC # BLD AUTO: 8.5 X10*3/UL (ref 4.4–11.3)
WBC #/AREA URNS AUTO: ABNORMAL /HPF

## 2024-09-27 PROCEDURE — 2500000004 HC RX 250 GENERAL PHARMACY W/ HCPCS (ALT 636 FOR OP/ED): Performed by: ANESTHESIOLOGY

## 2024-09-27 PROCEDURE — 74430 CONTRAST X-RAY BLADDER: CPT | Performed by: RADIOLOGY

## 2024-09-27 PROCEDURE — 2500000004 HC RX 250 GENERAL PHARMACY W/ HCPCS (ALT 636 FOR OP/ED): Performed by: NURSE ANESTHETIST, CERTIFIED REGISTERED

## 2024-09-27 PROCEDURE — 96361 HYDRATE IV INFUSION ADD-ON: CPT

## 2024-09-27 PROCEDURE — 7100000002 HC RECOVERY ROOM TIME - EACH INCREMENTAL 1 MINUTE: Performed by: STUDENT IN AN ORGANIZED HEALTH CARE EDUCATION/TRAINING PROGRAM

## 2024-09-27 PROCEDURE — 85610 PROTHROMBIN TIME: CPT | Performed by: PHYSICIAN ASSISTANT

## 2024-09-27 PROCEDURE — 52000 CYSTOURETHROSCOPY: CPT | Performed by: STUDENT IN AN ORGANIZED HEALTH CARE EDUCATION/TRAINING PROGRAM

## 2024-09-27 PROCEDURE — 96375 TX/PRO/DX INJ NEW DRUG ADDON: CPT

## 2024-09-27 PROCEDURE — 99222 1ST HOSP IP/OBS MODERATE 55: CPT

## 2024-09-27 PROCEDURE — 85025 COMPLETE CBC W/AUTO DIFF WBC: CPT | Performed by: PHYSICIAN ASSISTANT

## 2024-09-27 PROCEDURE — 99221 1ST HOSP IP/OBS SF/LOW 40: CPT | Performed by: STUDENT IN AN ORGANIZED HEALTH CARE EDUCATION/TRAINING PROGRAM

## 2024-09-27 PROCEDURE — 83935 ASSAY OF URINE OSMOLALITY: CPT | Mod: GEALAB

## 2024-09-27 PROCEDURE — 74176 CT ABD & PELVIS W/O CONTRAST: CPT

## 2024-09-27 PROCEDURE — 7100000001 HC RECOVERY ROOM TIME - INITIAL BASE CHARGE: Performed by: STUDENT IN AN ORGANIZED HEALTH CARE EDUCATION/TRAINING PROGRAM

## 2024-09-27 PROCEDURE — 3700000002 HC GENERAL ANESTHESIA TIME - EACH INCREMENTAL 1 MINUTE: Performed by: STUDENT IN AN ORGANIZED HEALTH CARE EDUCATION/TRAINING PROGRAM

## 2024-09-27 PROCEDURE — 99285 EMERGENCY DEPT VISIT HI MDM: CPT

## 2024-09-27 PROCEDURE — 2500000004 HC RX 250 GENERAL PHARMACY W/ HCPCS (ALT 636 FOR OP/ED): Performed by: STUDENT IN AN ORGANIZED HEALTH CARE EDUCATION/TRAINING PROGRAM

## 2024-09-27 PROCEDURE — 1100000001 HC PRIVATE ROOM DAILY

## 2024-09-27 PROCEDURE — 0T9B8ZZ DRAINAGE OF BLADDER, VIA NATURAL OR ARTIFICIAL OPENING ENDOSCOPIC: ICD-10-PCS | Performed by: STUDENT IN AN ORGANIZED HEALTH CARE EDUCATION/TRAINING PROGRAM

## 2024-09-27 PROCEDURE — 80053 COMPREHEN METABOLIC PANEL: CPT | Performed by: PHYSICIAN ASSISTANT

## 2024-09-27 PROCEDURE — 3700000001 HC GENERAL ANESTHESIA TIME - INITIAL BASE CHARGE: Performed by: STUDENT IN AN ORGANIZED HEALTH CARE EDUCATION/TRAINING PROGRAM

## 2024-09-27 PROCEDURE — 51600 INJECTION FOR BLADDER X-RAY: CPT | Performed by: RADIOLOGY

## 2024-09-27 PROCEDURE — 96374 THER/PROPH/DIAG INJ IV PUSH: CPT

## 2024-09-27 PROCEDURE — 83605 ASSAY OF LACTIC ACID: CPT | Performed by: PHYSICIAN ASSISTANT

## 2024-09-27 PROCEDURE — 82436 ASSAY OF URINE CHLORIDE: CPT | Mod: GEALAB

## 2024-09-27 PROCEDURE — 51702 INSERT TEMP BLADDER CATH: CPT

## 2024-09-27 PROCEDURE — 81001 URINALYSIS AUTO W/SCOPE: CPT | Performed by: PHYSICIAN ASSISTANT

## 2024-09-27 PROCEDURE — 87086 URINE CULTURE/COLONY COUNT: CPT | Mod: GEALAB | Performed by: PHYSICIAN ASSISTANT

## 2024-09-27 PROCEDURE — 2550000001 HC RX 255 CONTRASTS

## 2024-09-27 PROCEDURE — 96376 TX/PRO/DX INJ SAME DRUG ADON: CPT

## 2024-09-27 PROCEDURE — 3600000008 HC OR TIME - EACH INCREMENTAL 1 MINUTE - PROCEDURE LEVEL THREE: Performed by: STUDENT IN AN ORGANIZED HEALTH CARE EDUCATION/TRAINING PROGRAM

## 2024-09-27 PROCEDURE — 2500000004 HC RX 250 GENERAL PHARMACY W/ HCPCS (ALT 636 FOR OP/ED): Performed by: PHYSICIAN ASSISTANT

## 2024-09-27 PROCEDURE — 74176 CT ABD & PELVIS W/O CONTRAST: CPT | Performed by: RADIOLOGY

## 2024-09-27 PROCEDURE — 3600000003 HC OR TIME - INITIAL BASE CHARGE - PROCEDURE LEVEL THREE: Performed by: STUDENT IN AN ORGANIZED HEALTH CARE EDUCATION/TRAINING PROGRAM

## 2024-09-27 PROCEDURE — 72192 CT PELVIS W/O DYE: CPT

## 2024-09-27 PROCEDURE — 2500000005 HC RX 250 GENERAL PHARMACY W/O HCPCS: Performed by: NURSE ANESTHETIST, CERTIFIED REGISTERED

## 2024-09-27 PROCEDURE — 36415 COLL VENOUS BLD VENIPUNCTURE: CPT | Performed by: PHYSICIAN ASSISTANT

## 2024-09-27 RX ORDER — FENTANYL CITRATE 50 UG/ML
INJECTION, SOLUTION INTRAMUSCULAR; INTRAVENOUS AS NEEDED
Status: DISCONTINUED | OUTPATIENT
Start: 2024-09-27 | End: 2024-09-27

## 2024-09-27 RX ORDER — HEPARIN SODIUM 5000 [USP'U]/ML
5000 INJECTION, SOLUTION INTRAVENOUS; SUBCUTANEOUS EVERY 8 HOURS SCHEDULED
Status: DISCONTINUED | OUTPATIENT
Start: 2024-09-27 | End: 2024-09-30 | Stop reason: HOSPADM

## 2024-09-27 RX ORDER — ALBUTEROL SULFATE 0.83 MG/ML
2.5 SOLUTION RESPIRATORY (INHALATION) ONCE AS NEEDED
Status: DISCONTINUED | OUTPATIENT
Start: 2024-09-27 | End: 2024-09-27 | Stop reason: HOSPADM

## 2024-09-27 RX ORDER — MEPERIDINE HYDROCHLORIDE 25 MG/ML
12.5 INJECTION INTRAMUSCULAR; INTRAVENOUS; SUBCUTANEOUS EVERY 10 MIN PRN
Status: DISCONTINUED | OUTPATIENT
Start: 2024-09-27 | End: 2024-09-27 | Stop reason: HOSPADM

## 2024-09-27 RX ORDER — ASPIRIN 81 MG/1
81 TABLET ORAL NIGHTLY
Status: DISCONTINUED | OUTPATIENT
Start: 2024-09-27 | End: 2024-09-30 | Stop reason: HOSPADM

## 2024-09-27 RX ORDER — DIPHENHYDRAMINE HYDROCHLORIDE 50 MG/ML
12.5 INJECTION INTRAMUSCULAR; INTRAVENOUS ONCE AS NEEDED
Status: DISCONTINUED | OUTPATIENT
Start: 2024-09-27 | End: 2024-09-27 | Stop reason: HOSPADM

## 2024-09-27 RX ORDER — ONDANSETRON HYDROCHLORIDE 2 MG/ML
4 INJECTION, SOLUTION INTRAVENOUS ONCE
Status: COMPLETED | OUTPATIENT
Start: 2024-09-27 | End: 2024-09-27

## 2024-09-27 RX ORDER — HYDROCHLOROTHIAZIDE 25 MG/1
12.5 TABLET ORAL DAILY
Status: DISCONTINUED | OUTPATIENT
Start: 2024-09-27 | End: 2024-09-28

## 2024-09-27 RX ORDER — SODIUM CHLORIDE, SODIUM LACTATE, POTASSIUM CHLORIDE, CALCIUM CHLORIDE 600; 310; 30; 20 MG/100ML; MG/100ML; MG/100ML; MG/100ML
20 INJECTION, SOLUTION INTRAVENOUS CONTINUOUS
Status: DISCONTINUED | OUTPATIENT
Start: 2024-09-27 | End: 2024-09-28

## 2024-09-27 RX ORDER — OXYCODONE HYDROCHLORIDE 5 MG/1
5 TABLET ORAL EVERY 4 HOURS PRN
Status: DISCONTINUED | OUTPATIENT
Start: 2024-09-27 | End: 2024-09-27 | Stop reason: HOSPADM

## 2024-09-27 RX ORDER — PROPOFOL 10 MG/ML
INJECTION, EMULSION INTRAVENOUS AS NEEDED
Status: DISCONTINUED | OUTPATIENT
Start: 2024-09-27 | End: 2024-09-27

## 2024-09-27 RX ORDER — TRAMADOL HYDROCHLORIDE 50 MG/1
50 TABLET ORAL EVERY 6 HOURS PRN
Status: DISCONTINUED | OUTPATIENT
Start: 2024-09-27 | End: 2024-09-28

## 2024-09-27 RX ORDER — CEFAZOLIN SODIUM 2 G/100ML
2 INJECTION, SOLUTION INTRAVENOUS ONCE
Status: DISCONTINUED | OUTPATIENT
Start: 2024-09-27 | End: 2024-09-28

## 2024-09-27 RX ORDER — ONDANSETRON HYDROCHLORIDE 2 MG/ML
INJECTION, SOLUTION INTRAVENOUS AS NEEDED
Status: DISCONTINUED | OUTPATIENT
Start: 2024-09-27 | End: 2024-09-27

## 2024-09-27 RX ORDER — ATORVASTATIN CALCIUM 40 MG/1
40 TABLET, FILM COATED ORAL DAILY
Status: DISCONTINUED | OUTPATIENT
Start: 2024-09-27 | End: 2024-09-30 | Stop reason: HOSPADM

## 2024-09-27 RX ORDER — ONDANSETRON HYDROCHLORIDE 2 MG/ML
4 INJECTION, SOLUTION INTRAVENOUS ONCE AS NEEDED
Status: DISCONTINUED | OUTPATIENT
Start: 2024-09-27 | End: 2024-09-27 | Stop reason: HOSPADM

## 2024-09-27 RX ORDER — ACETAMINOPHEN 325 MG/1
1000 TABLET ORAL EVERY 6 HOURS PRN
Status: DISCONTINUED | OUTPATIENT
Start: 2024-09-27 | End: 2024-09-29

## 2024-09-27 RX ORDER — MORPHINE SULFATE 4 MG/ML
4 INJECTION INTRAVENOUS ONCE
Status: COMPLETED | OUTPATIENT
Start: 2024-09-27 | End: 2024-09-27

## 2024-09-27 RX ORDER — LEVOTHYROXINE SODIUM 100 UG/1
100 TABLET ORAL DAILY
Status: DISCONTINUED | OUTPATIENT
Start: 2024-09-28 | End: 2024-09-30 | Stop reason: HOSPADM

## 2024-09-27 RX ORDER — CEFAZOLIN 1 G/1
INJECTION, POWDER, FOR SOLUTION INTRAVENOUS AS NEEDED
Status: DISCONTINUED | OUTPATIENT
Start: 2024-09-27 | End: 2024-09-27

## 2024-09-27 RX ORDER — DROPERIDOL 2.5 MG/ML
0.62 INJECTION, SOLUTION INTRAMUSCULAR; INTRAVENOUS ONCE AS NEEDED
Status: DISCONTINUED | OUTPATIENT
Start: 2024-09-27 | End: 2024-09-27 | Stop reason: HOSPADM

## 2024-09-27 RX ORDER — LIDOCAINE HYDROCHLORIDE 10 MG/ML
INJECTION, SOLUTION EPIDURAL; INFILTRATION; INTRACAUDAL; PERINEURAL AS NEEDED
Status: DISCONTINUED | OUTPATIENT
Start: 2024-09-27 | End: 2024-09-27

## 2024-09-27 RX ORDER — SODIUM CHLORIDE, SODIUM LACTATE, POTASSIUM CHLORIDE, CALCIUM CHLORIDE 600; 310; 30; 20 MG/100ML; MG/100ML; MG/100ML; MG/100ML
100 INJECTION, SOLUTION INTRAVENOUS CONTINUOUS
Status: DISCONTINUED | OUTPATIENT
Start: 2024-09-27 | End: 2024-09-27 | Stop reason: HOSPADM

## 2024-09-27 RX ORDER — SUCCINYLCHOLINE CHLORIDE 20 MG/ML
INJECTION INTRAMUSCULAR; INTRAVENOUS AS NEEDED
Status: DISCONTINUED | OUTPATIENT
Start: 2024-09-27 | End: 2024-09-27

## 2024-09-27 RX ORDER — ROCURONIUM BROMIDE 10 MG/ML
INJECTION, SOLUTION INTRAVENOUS AS NEEDED
Status: DISCONTINUED | OUTPATIENT
Start: 2024-09-27 | End: 2024-09-27

## 2024-09-27 RX ORDER — AMLODIPINE BESYLATE 5 MG/1
5 TABLET ORAL DAILY
Status: DISCONTINUED | OUTPATIENT
Start: 2024-09-27 | End: 2024-09-27

## 2024-09-27 RX ORDER — OXYCODONE HYDROCHLORIDE 5 MG/1
5 TABLET ORAL EVERY 6 HOURS PRN
Status: DISCONTINUED | OUTPATIENT
Start: 2024-09-27 | End: 2024-09-28

## 2024-09-27 RX ORDER — ESTRADIOL 0.1 MG/G
2 CREAM VAGINAL DAILY
Status: DISCONTINUED | OUTPATIENT
Start: 2024-09-27 | End: 2024-09-30 | Stop reason: HOSPADM

## 2024-09-27 RX ORDER — AMLODIPINE BESYLATE 5 MG/1
5 TABLET ORAL DAILY
Status: DISCONTINUED | OUTPATIENT
Start: 2024-09-28 | End: 2024-09-30 | Stop reason: HOSPADM

## 2024-09-27 SDOH — SOCIAL STABILITY: SOCIAL INSECURITY: WITHIN THE LAST YEAR, HAVE YOU BEEN AFRAID OF YOUR PARTNER OR EX-PARTNER?: NO

## 2024-09-27 SDOH — ECONOMIC STABILITY: INCOME INSECURITY: IN THE PAST 12 MONTHS, HAS THE ELECTRIC, GAS, OIL, OR WATER COMPANY THREATENED TO SHUT OFF SERVICE IN YOUR HOME?: NO

## 2024-09-27 SDOH — SOCIAL STABILITY: SOCIAL INSECURITY
WITHIN THE LAST YEAR, HAVE TO BEEN RAPED OR FORCED TO HAVE ANY KIND OF SEXUAL ACTIVITY BY YOUR PARTNER OR EX-PARTNER?: NO

## 2024-09-27 SDOH — HEALTH STABILITY: MENTAL HEALTH: HOW OFTEN DO YOU HAVE 6 OR MORE DRINKS ON ONE OCCASION?: NEVER

## 2024-09-27 SDOH — HEALTH STABILITY: MENTAL HEALTH: HOW OFTEN DO YOU HAVE A DRINK CONTAINING ALCOHOL?: NEVER

## 2024-09-27 SDOH — SOCIAL STABILITY: SOCIAL INSECURITY: HAVE YOU HAD ANY THOUGHTS OF HARMING ANYONE ELSE?: NO

## 2024-09-27 SDOH — SOCIAL STABILITY: SOCIAL INSECURITY: WITHIN THE LAST YEAR, HAVE YOU BEEN HUMILIATED OR EMOTIONALLY ABUSED IN OTHER WAYS BY YOUR PARTNER OR EX-PARTNER?: NO

## 2024-09-27 SDOH — ECONOMIC STABILITY: FOOD INSECURITY: WITHIN THE PAST 12 MONTHS, YOU WORRIED THAT YOUR FOOD WOULD RUN OUT BEFORE YOU GOT MONEY TO BUY MORE.: NEVER TRUE

## 2024-09-27 SDOH — HEALTH STABILITY: MENTAL HEALTH: HOW MANY STANDARD DRINKS CONTAINING ALCOHOL DO YOU HAVE ON A TYPICAL DAY?: PATIENT DOES NOT DRINK

## 2024-09-27 SDOH — SOCIAL STABILITY: SOCIAL INSECURITY: DOES ANYONE TRY TO KEEP YOU FROM HAVING/CONTACTING OTHER FRIENDS OR DOING THINGS OUTSIDE YOUR HOME?: NO

## 2024-09-27 SDOH — SOCIAL STABILITY: SOCIAL INSECURITY: ABUSE: ADULT

## 2024-09-27 SDOH — ECONOMIC STABILITY: HOUSING INSECURITY: AT ANY TIME IN THE PAST 12 MONTHS, WERE YOU HOMELESS OR LIVING IN A SHELTER (INCLUDING NOW)?: NO

## 2024-09-27 SDOH — SOCIAL STABILITY: SOCIAL INSECURITY
WITHIN THE LAST YEAR, HAVE YOU BEEN KICKED, HIT, SLAPPED, OR OTHERWISE PHYSICALLY HURT BY YOUR PARTNER OR EX-PARTNER?: NO

## 2024-09-27 SDOH — ECONOMIC STABILITY: INCOME INSECURITY: HOW HARD IS IT FOR YOU TO PAY FOR THE VERY BASICS LIKE FOOD, HOUSING, MEDICAL CARE, AND HEATING?: NOT HARD AT ALL

## 2024-09-27 SDOH — SOCIAL STABILITY: SOCIAL INSECURITY: ARE THERE ANY APPARENT SIGNS OF INJURIES/BEHAVIORS THAT COULD BE RELATED TO ABUSE/NEGLECT?: NO

## 2024-09-27 SDOH — SOCIAL STABILITY: SOCIAL INSECURITY: HAVE YOU HAD THOUGHTS OF HARMING ANYONE ELSE?: NO

## 2024-09-27 SDOH — ECONOMIC STABILITY: INCOME INSECURITY: IN THE LAST 12 MONTHS, WAS THERE A TIME WHEN YOU WERE NOT ABLE TO PAY THE MORTGAGE OR RENT ON TIME?: NO

## 2024-09-27 SDOH — ECONOMIC STABILITY: HOUSING INSECURITY: IN THE PAST 12 MONTHS, HOW MANY TIMES HAVE YOU MOVED WHERE YOU WERE LIVING?: 1

## 2024-09-27 SDOH — HEALTH STABILITY: MENTAL HEALTH: CURRENT SMOKER: 0

## 2024-09-27 SDOH — ECONOMIC STABILITY: FOOD INSECURITY: WITHIN THE PAST 12 MONTHS, THE FOOD YOU BOUGHT JUST DIDN'T LAST AND YOU DIDN'T HAVE MONEY TO GET MORE.: NEVER TRUE

## 2024-09-27 SDOH — ECONOMIC STABILITY: TRANSPORTATION INSECURITY
IN THE PAST 12 MONTHS, HAS LACK OF TRANSPORTATION KEPT YOU FROM MEETINGS, WORK, OR FROM GETTING THINGS NEEDED FOR DAILY LIVING?: NO

## 2024-09-27 SDOH — SOCIAL STABILITY: SOCIAL INSECURITY: DO YOU FEEL ANYONE HAS EXPLOITED OR TAKEN ADVANTAGE OF YOU FINANCIALLY OR OF YOUR PERSONAL PROPERTY?: NO

## 2024-09-27 SDOH — SOCIAL STABILITY: SOCIAL INSECURITY: DO YOU FEEL UNSAFE GOING BACK TO THE PLACE WHERE YOU ARE LIVING?: NO

## 2024-09-27 SDOH — SOCIAL STABILITY: SOCIAL INSECURITY: WERE YOU ABLE TO COMPLETE ALL THE BEHAVIORAL HEALTH SCREENINGS?: YES

## 2024-09-27 SDOH — SOCIAL STABILITY: SOCIAL INSECURITY: HAS ANYONE EVER THREATENED TO HURT YOUR FAMILY OR YOUR PETS?: NO

## 2024-09-27 SDOH — SOCIAL STABILITY: SOCIAL INSECURITY: ARE YOU OR HAVE YOU BEEN THREATENED OR ABUSED PHYSICALLY, EMOTIONALLY, OR SEXUALLY BY ANYONE?: NO

## 2024-09-27 SDOH — ECONOMIC STABILITY: TRANSPORTATION INSECURITY
IN THE PAST 12 MONTHS, HAS THE LACK OF TRANSPORTATION KEPT YOU FROM MEDICAL APPOINTMENTS OR FROM GETTING MEDICATIONS?: NO

## 2024-09-27 ASSESSMENT — COGNITIVE AND FUNCTIONAL STATUS - GENERAL
MOBILITY SCORE: 24
MOBILITY SCORE: 24
DAILY ACTIVITIY SCORE: 24
DAILY ACTIVITIY SCORE: 24
PATIENT BASELINE BEDBOUND: NO

## 2024-09-27 ASSESSMENT — ACTIVITIES OF DAILY LIVING (ADL)
GROOMING: INDEPENDENT
WALKS IN HOME: INDEPENDENT
FEEDING YOURSELF: INDEPENDENT
TOILETING: INDEPENDENT
PATIENT'S MEMORY ADEQUATE TO SAFELY COMPLETE DAILY ACTIVITIES?: YES
ADEQUATE_TO_COMPLETE_ADL: YES
BATHING: INDEPENDENT
HEARING - RIGHT EAR: FUNCTIONAL
JUDGMENT_ADEQUATE_SAFELY_COMPLETE_DAILY_ACTIVITIES: YES
LACK_OF_TRANSPORTATION: NO
DRESSING YOURSELF: INDEPENDENT
HEARING - LEFT EAR: FUNCTIONAL

## 2024-09-27 ASSESSMENT — PAIN SCALES - GENERAL
PAINLEVEL_OUTOF10: 8
PAINLEVEL_OUTOF10: 5 - MODERATE PAIN
PAINLEVEL_OUTOF10: 6
PAINLEVEL_OUTOF10: 0 - NO PAIN
PAINLEVEL_OUTOF10: 4
PAINLEVEL_OUTOF10: 6
PAINLEVEL_OUTOF10: 5 - MODERATE PAIN

## 2024-09-27 ASSESSMENT — LIFESTYLE VARIABLES
HAVE YOU EVER FELT YOU SHOULD CUT DOWN ON YOUR DRINKING: NO
SKIP TO QUESTIONS 9-10: 1
EVER FELT BAD OR GUILTY ABOUT YOUR DRINKING: NO
HAVE PEOPLE ANNOYED YOU BY CRITICIZING YOUR DRINKING: NO
TOTAL SCORE: 0
AUDIT-C TOTAL SCORE: 0
EVER HAD A DRINK FIRST THING IN THE MORNING TO STEADY YOUR NERVES TO GET RID OF A HANGOVER: NO

## 2024-09-27 ASSESSMENT — PATIENT HEALTH QUESTIONNAIRE - PHQ9
2. FEELING DOWN, DEPRESSED OR HOPELESS: NOT AT ALL
SUM OF ALL RESPONSES TO PHQ9 QUESTIONS 1 & 2: 0
1. LITTLE INTEREST OR PLEASURE IN DOING THINGS: NOT AT ALL

## 2024-09-27 ASSESSMENT — PAIN - FUNCTIONAL ASSESSMENT
PAIN_FUNCTIONAL_ASSESSMENT: 0-10

## 2024-09-27 ASSESSMENT — COLUMBIA-SUICIDE SEVERITY RATING SCALE - C-SSRS
2. HAVE YOU ACTUALLY HAD ANY THOUGHTS OF KILLING YOURSELF?: NO
6. HAVE YOU EVER DONE ANYTHING, STARTED TO DO ANYTHING, OR PREPARED TO DO ANYTHING TO END YOUR LIFE?: NO
1. IN THE PAST MONTH, HAVE YOU WISHED YOU WERE DEAD OR WISHED YOU COULD GO TO SLEEP AND NOT WAKE UP?: NO
1. IN THE PAST MONTH, HAVE YOU WISHED YOU WERE DEAD OR WISHED YOU COULD GO TO SLEEP AND NOT WAKE UP?: NO
6. HAVE YOU EVER DONE ANYTHING, STARTED TO DO ANYTHING, OR PREPARED TO DO ANYTHING TO END YOUR LIFE?: NO
2. HAVE YOU ACTUALLY HAD ANY THOUGHTS OF KILLING YOURSELF?: NO

## 2024-09-27 ASSESSMENT — PAIN DESCRIPTION - ORIENTATION: ORIENTATION: LOWER

## 2024-09-27 ASSESSMENT — PAIN DESCRIPTION - PROGRESSION: CLINICAL_PROGRESSION: GRADUALLY IMPROVING

## 2024-09-27 ASSESSMENT — PAIN DESCRIPTION - DESCRIPTORS
DESCRIPTORS: ACHING;BURNING
DESCRIPTORS: ACHING

## 2024-09-27 ASSESSMENT — PAIN DESCRIPTION - PAIN TYPE
TYPE: ACUTE PAIN
TYPE: ACUTE PAIN

## 2024-09-27 ASSESSMENT — PAIN DESCRIPTION - LOCATION
LOCATION: VAGINA
LOCATION: ABDOMEN

## 2024-09-27 NOTE — ANESTHESIA PROCEDURE NOTES
Airway  Date/Time: 9/27/2024 5:46 PM  Urgency: elective    Airway not difficult    Staffing  Performed: CRNA   Authorized by: Manjit Cole MD    Performed by: BALAJI Barr-KENYA  Patient location during procedure: OR    Indications and Patient Condition  Indications for airway management: anesthesia  Spontaneous Ventilation: absent  Sedation level: deep  Preoxygenated: yes  Patient position: sniffing  Mask difficulty assessment: 1 - vent by mask    Final Airway Details  Final airway type: endotracheal airway      Successful airway: ETT  Cuffed: yes   Successful intubation technique: video laryngoscopy  Facilitating devices/methods: intubating stylet  Blade size: #3  ETT size (mm): 7.0  Cormack-Lehane Classification: grade I - full view of glottis  Placement verified by: chest auscultation and capnometry   Cuff volume (mL): 6  Measured from: lips  ETT to lips (cm): 20  Ventilation between attempts: none  Number of other approaches attempted: 0

## 2024-09-27 NOTE — H&P
"  HPI    Verenice Sotelo is a 69 y.o. female who presented to the hospital for   Chief Complaint   Patient presents with    Abdominal Pain     HPI:  Verenice Sotelo is a 69 year old female with past medication history of HTN, hyperlipidemia, hypothyroidism, Raynauds, genital prolapse s/p bladder sling placement who presented to the hospital for lower abdominal discomfort and vulvar pain/swelling.    On 9/16/24, Patient had sling placement, hysterectomy, and laparoscopic colpopexy procedures done by Dr. Johnson for genital prolapse and urinary incontinence. Patient had an unremarkable post-operative course for several days following the procedure and was able to ambulate with minimal pain.     Patient began experiencing lower abdominal discomfort and vulvar pain around 9/19. She first noticed a stinging pain near the left side of her vagina. The pain progressively worsened and that area became swollen and was \"hard as a rock\". The swelling pain progressed to other areas of her vagina. She reports pain with urination as urine passes the affected areas. She also complains of pain at rest which becomes more severe when she tries to walk. She has been wearing pads due to difficulty making it to the bathroom. Since the surgery, patient has had normal p.o intake. Denies any nausea, vomiting, fevers, chills, chest pain, or any other symptoms. She reports normal bowel movements.         ED Course   Vitals - /81   Pulse 62   Temp 36.6 °C (97.9 °F) (Temporal)   Resp 20   Wt 72.6 kg (160 lb)   SpO2 98%     Lab Results   Component Value Date    WBC 8.5 09/27/2024    HGB 12.3 09/27/2024    HCT 36.9 09/27/2024    MCV 91 09/27/2024     09/27/2024     Lab Results   Component Value Date    GLUCOSE 88 09/27/2024    CALCIUM 9.0 09/27/2024     09/27/2024    K 4.4 09/27/2024    CO2 25 09/27/2024     09/27/2024    BUN 42 (H) 09/27/2024    CREATININE 2.75 (H) 09/27/2024   No results found for: \"TROPHS\"No " "results found for: \"BNP\"No results found for: \"DDIMERVTE\"    Imaging  CT abdomen pelvis wo IV contrast   Final Result   1. Ascites in the abdomen and pelvis without pneumoperitoneum or   intraperitoneal abscess; correlate clinically.   2. Fat stranding involving the lower abdominal/pelvic subcutaneous   tissues extending inferiorly to the level of the perineum, with an   elongated fluid collection anteriorly to the left anterior pelvic   musculature, most likely a postsurgical seroma. Correlate clinically   and follow-up as needed.   3. Small hiatal hernia. Colonic diverticulosis without acute   diverticulitis.        Signed by: Reed Thomas 9/27/2024 1:05 PM   Dictation workstation:   [id number]           Interventions -   Medications   sodium chloride 0.9 % bolus 1,000 mL (0 mL intravenous Stopped 9/27/24 1307)   ondansetron (Zofran) injection 4 mg (4 mg intravenous Given 9/27/24 1150)   morphine injection 4 mg (4 mg intravenous Given 9/27/24 1149)   morphine injection 4 mg (4 mg intravenous Given 9/27/24 1350)       Micro:   - Urine culture in process  - UA: 75 leukocyte esterase and 6-10 WBCs. Negative nitrite, blood, protein, ketones.     ED Interventions: Zofran, 1L normal saline bolus, morphine 4mg x2     ROS: 12 points review of system is negative except as stated in the HPI above.     Past Medical History   She has a past medical history of Acute maxillary sinusitis, unspecified (10/26/2016), Arthritis, Diverticulosis, Encounter for immunization (01/09/2020), Encounter for immunization (09/19/2016), Encounter for immunization (09/19/2016), Encounter for screening for other viral diseases (12/01/2017), Female genital prolapse, Hyperlipidemia, Hypertension, Hypothyroidism, Midline cystocele, OAB (overactive bladder), Osteopenia, Personal history of other specified conditions (04/23/2015), Raynaud's disease, Right carotid bruit, FRANCIS (stress urinary incontinence, female), Wears glasses, and Wears partial " dentures.  Surgical History     Past Surgical History:   Procedure Laterality Date    CATARACT EXTRACTION  06/11/2015    Cataract Surgery    HYSTERECTOMY N/A 09/16/2024    SACROCOLPOPEXY N/A 09/16/2024    TUBAL LIGATION  10/18/2013    Tubal Ligation    URETHRAL SLING N/A 09/16/2024     Family History     Family History   Problem Relation Name Age of Onset    Cervical cancer Mother      Prostate cancer Father      Diabetes type II Father      Aneurysm Sister          Ruptured Cerebral    Hyperlipidemia Brother      Diabetes type II Brother       Social History     Social History     Socioeconomic History    Marital status:      Spouse name: Not on file    Number of children: Not on file    Years of education: Not on file    Highest education level: Not on file   Occupational History    Not on file   Tobacco Use    Smoking status: Never    Smokeless tobacco: Never   Substance and Sexual Activity    Alcohol use: Never    Drug use: Never    Sexual activity: Not on file   Other Topics Concern    Not on file   Social History Narrative    Not on file     Social Determinants of Health     Financial Resource Strain: Low Risk  (9/27/2024)    Overall Financial Resource Strain (CARDIA)     Difficulty of Paying Living Expenses: Not hard at all   Food Insecurity: Not on file   Transportation Needs: No Transportation Needs (9/27/2024)    PRAPARE - Transportation     Lack of Transportation (Medical): No     Lack of Transportation (Non-Medical): No   Physical Activity: Not on file   Stress: Not on file   Social Connections: Not on file   Intimate Partner Violence: Not on file   Housing Stability: Low Risk  (9/27/2024)    Housing Stability Vital Sign     Unable to Pay for Housing in the Last Year: No     Number of Times Moved in the Last Year: 1     Homeless in the Last Year: No       Tobacco Use: Low Risk  (9/27/2024)    Patient History     Smoking Tobacco Use: Never     Smokeless Tobacco Use: Never     Passive Exposure: Not  "on file        Social History     Substance and Sexual Activity   Alcohol Use Never      Allergies     Allergies   Allergen Reactions    Lisinopril Cough    Sulfa (Sulfonamide Antibiotics) Other, Rash and Unknown         Objective     Vitals  Visit Vitals  /81   Pulse 62   Temp 36.6 °C (97.9 °F) (Temporal)   Resp 20   Ht 1.702 m (5' 7\")   Wt 72.6 kg (160 lb)   SpO2 98%   BMI 25.06 kg/m²   OB Status Postmenopausal   Smoking Status Never   BSA 1.85 m²        Physical Examination:  Gen: Tearful and in pain  HEENT: AT/NC, no conjunctival pallor, anicteric sclera, EOMI   Neck: supple, no LAD/JVD  Chest: CTAB, no wheezing / labored respirations  CVS: RRR, no murmurs  Abd: Wearing abdominal binder. Well-healing laparoscopic surgery insertion sites. Abdomen soft, flat, NT/ND, BS+  Ext: no cyanosis/clubbing or pedal edema  Neuro: AOx3, motor 5/5 globally, sensation intact    I/Os  No intake or output data in the 24 hours ending 09/27/24 1408    Labs:   Results from last 72 hours   Lab Units 09/27/24  1147   SODIUM mmol/L 136   POTASSIUM mmol/L 4.4   CHLORIDE mmol/L 103   CO2 mmol/L 25   BUN mg/dL 42*   CREATININE mg/dL 2.75*   GLUCOSE mg/dL 88   CALCIUM mg/dL 9.0   ANION GAP mmol/L 12   EGFR mL/min/1.73m*2 18*      Results from last 72 hours   Lab Units 09/27/24  1147   WBC AUTO x10*3/uL 8.5   HEMOGLOBIN g/dL 12.3   HEMATOCRIT % 36.9   PLATELETS AUTO x10*3/uL 321   NEUTROS PCT AUTO % 70.6   LYMPHS PCT AUTO % 21.0   MONOS PCT AUTO % 6.1   EOS PCT AUTO % 1.4      Lab Results   Component Value Date    CALCIUM 9.0 09/27/2024        Images    CT abdomen pelvis wo IV contrast  Narrative: Interpreted By:  Reed Thomas,   STUDY:  CT ABDOMEN PELVIS WO IV CONTRAST; 9/27/2024 12:37 pm      INDICATION:  Signs/Symptoms:lower abd pain and swelling with recent surgery      COMPARISON:  None      ACCESSION NUMBER(S):  [id number]      ORDERING CLINICIAN:  KAREN BANDA      TECHNIQUE:  Spiral axial unenhanced images were obtained " from the upper abdomen  to the symphysis pubis. Oral contrast was not administered.      All CT examinations are performed with one or more of the following  dose reduction techniques: Automated Exposure Control, adjustment of  mA and/or kV according to patient size, or use of iterative  reconstruction techniques.      FINDINGS:  Lung bases: The lung bases are clear.  Liver: Normal in size without focal lesions.  Gallbladder: Grossly unremarkable.  Spleen: Normal in size without focal lesions.  Pancreas: Unremarkable.  Adrenal glands: No focal lesions.  The kidneys are normal in size and position.  There are no renal calculi or hydronephrosis.  No abnormal calcifications are seen within the course of the ureters  or within the bladder. Pelvic reproductive organs: Unremarkable.      Small hiatal hernia is noted. Several colonic diverticula are  present, without acute diverticulitis. There is no bowel obstruction.  The appendix is seen and appears normal. There is free fluid in the  abdomen and pelvis, however there is no pneumoperitoneum or abscess.  There is also fat stranding of the subcutaneous soft tissues of the  anterior pelvic wall, extending inferiorly to the perineum. There is  also elongated fluid collection anteriorly to the left anterior  pelvic wall measuring up to 2 cm in thickness, most likely a  postsurgical seroma. Mild atherosclerotic calcifications involve the  aorta, without focal aneurysm.      Impression: 1. Ascites in the abdomen and pelvis without pneumoperitoneum or  intraperitoneal abscess; correlate clinically.  2. Fat stranding involving the lower abdominal/pelvic subcutaneous  tissues extending inferiorly to the level of the perineum, with an  elongated fluid collection anteriorly to the left anterior pelvic  musculature, most likely a postsurgical seroma. Correlate clinically  and follow-up as needed.  3. Small hiatal hernia. Colonic diverticulosis without acute  diverticulitis.       Signed by: Reed Thomas 9/27/2024 1:05 PM  Dictation workstation:   [id number]    Assessment and Plan      Verenice Sotelo is a 69 y.o. female admitted for the management of lower abdominal discomfort and vaginal pain s/p multiple urologic procedures 9/16.     Acute Medical Issues   #Vaginal and lower abdominal pain s/p multiple urologic procedures on 9/16  -ED spoke with Dr. Johnson who will evaluate the patient  -CT a/p showed ascites and fatstranding involving lower abdominal/pelvic subcuteanous tissues  -CT cystogram ordered, follow up results  -Place reed catheter  -Pain regimen in place  -NPO for now    #Acute Kidney Injury  -Creatinine 2.75 (baseline ~0.75)  -Likely post-obstructive following her surgery on 9/16  -1L normal saline and morphine given in ED  -Electrolytes within normal limits  -Place reed catheter  -Continue to trend    Chronic Medical Issues   #Hypertension  -Continue amlodipine  -Hold hydrochlorothiazide in setting of JOSE F    #Hypothyroidism  -Continue home levothyroxine     #Hyperlipidemia  -Continue home atorvastatin     F: IVF PRN   E: Replete PRN  N: NPO for now  GI ppx: None  DVT ppx: heparin and scd  Antibiotics: None  Tubes/Lines/Drains: PIVs    Code Status: Full Code   Emergency Contact: Extended Emergency Contact Information  Primary Emergency Contact: Eder Sotelo  Home Phone: [phone]  Mobile Phone: [phone]  Relation: Spouse     Disposition: 69 y.o.female admitted for management of lower abdominal and vaginal pain s/p multiple urological procedures. Anticipate >48hr stay.     Em Salcido MD  Transitional Year, PGY-1      Disclaimer: Documentation completed with the information available at the time of input. The times in the chart may not be reflective of actual patient care times, interventions, or procedures. Documentation occurs after the physical care of the patient. Dictation technology was used during documentation.

## 2024-09-27 NOTE — CONSULTS
CHIEF COMPLAINT:           HISTORY OF PRESENT ILLNESS:  This is a  69 y.o. y.o. who presents with pain, n/v, s/p sling and lap nayeli/scp 9/16/2024.  Seen yesterday in clinic for what was believed to be retropubic hematoma.  However patient presented today she had a CT scan demonstrated fluid in the abdomen and pelvis as well as in the retropubic space.  A CT cystogram demonstrated a clear tract extending from the bladder  into the subcutaneous tissue.         Past Medical History  She has a past medical history of Acute maxillary sinusitis, unspecified (10/26/2016), Arthritis, Diverticulosis, Encounter for immunization (01/09/2020), Encounter for immunization (09/19/2016), Encounter for immunization (09/19/2016), Encounter for screening for other viral diseases (12/01/2017), Female genital prolapse, Hyperlipidemia, Hypertension, Hypothyroidism, Midline cystocele, OAB (overactive bladder), Osteopenia, Personal history of other specified conditions (04/23/2015), Raynaud's disease, Right carotid bruit, FRANCIS (stress urinary incontinence, female), Wears glasses, and Wears partial dentures.    Surgical History  She has a past surgical history that includes Tubal ligation (10/18/2013); Cataract extraction (06/11/2015); Hysterectomy (N/A, 09/16/2024); Urethral sling (N/A, 09/16/2024); and Sacrocolpopexy (N/A, 09/16/2024).     Social History  She reports that she has never smoked. She has never used smokeless tobacco. She reports that she does not drink alcohol and does not use drugs.    Family History  Family History   Problem Relation Name Age of Onset    Cervical cancer Mother      Prostate cancer Father      Diabetes type II Father      Aneurysm Sister          Ruptured Cerebral    Hyperlipidemia Brother      Diabetes type II Brother          Allergies  Lisinopril and Sulfa (sulfonamide antibiotics)        A comprehensive 10+ review of systems was negative except for: see hpi                    PHYSICAL  "EXAMINATION:  BP Readings from Last 3 Encounters:   09/27/24 127/78   09/16/24 112/58   09/13/24 124/60      Wt Readings from Last 3 Encounters:   09/27/24 72.6 kg (160 lb)   09/16/24 71.4 kg (157 lb 6.5 oz)   09/13/24 72.6 kg (160 lb)      BMI: Estimated body mass index is 25.06 kg/m² as calculated from the following:    Height as of this encounter: 1.702 m (5' 7\").    Weight as of this encounter: 72.6 kg (160 lb).  BSA: Estimated body surface area is 1.85 meters squared as calculated from the following:    Height as of this encounter: 1.702 m (5' 7\").    Weight as of this encounter: 72.6 kg (160 lb).  HEENT: Normocephalic, atraumatic, PER EOMI, nonicteric, trachea normal, thyroid normal, oropharynx normal.  CARDIAC: regular rate & rhythm, S1 & S2 normal.  No heaves, thrills, gallops or murmurs.  LUNGS: Clear to auscultation, no spinal or CV tenderness.  EXTREMITIES: No evidence of cyanosis, clubbing or edema.      Pelvi    IMPRESSION AND PLAN:  Verenice Sotelo is a 69 y.o. who presents with suspected bladder perforation    Will plan on cystoscopy if no evidence of mesh seen we will leave the Teresa in for several weeks and repeat a CT cystogram to confirm healing    If there is presence of mesh, patient will need excision and may need to be [place] to [facility] [place]    Elevated creatinine is likely not due to JOSE F but rather due to absorption of urine, would continue monitoring          All questions and concerns were answered and addressed.  The patient expressed understanding and agrees with the plan.     9/27/2024       "

## 2024-09-27 NOTE — ED PROVIDER NOTES
HPI   No chief complaint on file.      This is a 69-year-old female coming in for lower abdominal discomfort.  Patient had an September 19 multiple procedures completed including a bladder sling, hysterectomy and vaginal mesh placed.  Patient reports that since the procedure she has had increased progressing pain to the lower abdomen.  She is not voiding as much as she feels like she should be and is also having some dribbling with voiding.  Patient denies any fevers.  No constipation.  Having normal bowel movements.  She has not had any vomiting.  Patient called her surgeon, Dr. Johnson who advised her to come in to be evaluated.      History provided by:  Patient          Patient History   Past Medical History:   Diagnosis Date    Acute maxillary sinusitis, unspecified 10/26/2016    Acute maxillary sinusitis    Arthritis     Diverticulosis     Encounter for immunization 01/09/2020    Need for shingles vaccine    Encounter for immunization 09/19/2016    Need for shingles vaccine    Encounter for immunization 09/19/2016    Needs flu shot    Encounter for screening for other viral diseases 12/01/2017    Need for hepatitis C screening test    Female genital prolapse     Hyperlipidemia     Hypertension     Hypothyroidism     Midline cystocele     OAB (overactive bladder)     Osteopenia     Personal history of other specified conditions 04/23/2015    History of diarrhea    Raynaud's disease     Right carotid bruit     FRANCIS (stress urinary incontinence, female)     Wears glasses     Wears partial dentures     lower     Past Surgical History:   Procedure Laterality Date    CATARACT EXTRACTION  06/11/2015    Cataract Surgery    HYSTERECTOMY N/A 09/16/2024    SACROCOLPOPEXY N/A 09/16/2024    TUBAL LIGATION  10/18/2013    Tubal Ligation    URETHRAL SLING N/A 09/16/2024     Family History   Problem Relation Name Age of Onset    Cervical cancer Mother      Prostate cancer Father      Diabetes type II Father      Aneurysm Sister           Ruptured Cerebral    Hyperlipidemia Brother      Diabetes type II Brother       Social History     Tobacco Use    Smoking status: Never    Smokeless tobacco: Never   Substance Use Topics    Alcohol use: Never    Drug use: Never       Physical Exam   ED Triage Vitals   Temp Pulse Resp BP   -- -- -- --      SpO2 Temp src Heart Rate Source Patient Position   -- -- -- --      BP Location FiO2 (%)     -- --       Physical Exam  Vitals and nursing note reviewed.   Constitutional:       General: She is not in acute distress.     Appearance: Normal appearance. She is not toxic-appearing.   HENT:      Head: Normocephalic and atraumatic.      Nose: Nose normal.      Mouth/Throat:      Mouth: Mucous membranes are moist.      Pharynx: Oropharynx is clear.   Eyes:      Extraocular Movements: Extraocular movements intact.      Conjunctiva/sclera: Conjunctivae normal.      Pupils: Pupils are equal, round, and reactive to light.   Cardiovascular:      Rate and Rhythm: Regular rhythm.      Pulses: Normal pulses.      Heart sounds: Normal heart sounds.   Pulmonary:      Effort: Pulmonary effort is normal. No respiratory distress.      Breath sounds: Normal breath sounds.   Abdominal:      General: Abdomen is flat. Bowel sounds are normal.      Palpations: Abdomen is soft.      Tenderness: There is abdominal tenderness.   Musculoskeletal:         General: Normal range of motion.      Cervical back: Normal range of motion and neck supple.   Skin:     General: Skin is warm and dry.      Coloration: Skin is not jaundiced or pale.      Findings: No bruising.   Neurological:      General: No focal deficit present.      Mental Status: She is alert and oriented to person, place, and time. Mental status is at baseline.   Psychiatric:         Mood and Affect: Mood normal.         Behavior: Behavior normal.           ED Course & MDM                  No data recorded                                 Medical Decision  Making  Summary:  Medical Decision Making:   Patient presented as described in HPI. Patient case including ROS, PE, and treatment and plan discussed with ED attending if attached as cosigner. Results from labs and or imaging included below if completed. Verenice Sotelo  is a 69 y.o. coming in for Patient presents with:  Abdominal Pain  .  Patient complains of lower abdominal pain.  She does have recent surgery.  CT without contrast was completed because patient's lab work did show an acute kidney injury.  Patient's creatinine is 2.75.  Spoke with Dr. Johnson who advised to attempt a get a CT urogram Or CT cystogram and hospitalized.  Also advised placing Teresa catheter.  Patient was given IV hydration.  She was given morphine for pain.  She states it did help her discomfort. CT did show ascites as well as other abnormalities.  Patient is made aware of the findings.  She agrees with hospitalization.      Shared decision making was completed for required hospital stay. I also explained the plan and treatment course. Patient/guardian is in agreement with plan, treatment course, and state that they will comply.    Labs Reviewed  COMPREHENSIVE METABOLIC PANEL - Abnormal     Glucose                       88                     Sodium                        136                    Potassium                     4.4                    Chloride                      103                    Bicarbonate                   25                     Anion Gap                     12                     Urea Nitrogen                 42 (*)                 Creatinine                    2.75 (*)               eGFR                          18 (*)                 Calcium                       9.0                    Albumin                       3.7                    Alkaline Phosphatase          89                     Total Protein                 6.6                    AST                           18                     Bilirubin, Total               0.4                    ALT                           15                  PROTIME-INR - Normal     Protime                       11.7                   INR                           1.0                 LACTATE - Normal     Lactate                       0.7                      Narrative: Venipuncture immediately after or during the administration of Metamizole may lead to falsely low results. Testing should be performed immediately prior to Metamizole dosing.  CBC WITH AUTO DIFFERENTIAL     WBC                           8.5                    nRBC                          0.0                    RBC                           4.04                   Hemoglobin                    12.3                   Hematocrit                    36.9                   MCV                           91                     MCH                           30.4                   MCHC                          33.3                   RDW                           11.9                   Platelets                     321                    Neutrophils %                 70.6                   Immature Granulocytes %, Automated   0.4                    Lymphocytes %                 21.0                   Monocytes %                   6.1                    Eosinophils %                 1.4                    Basophils %                   0.5                    Neutrophils Absolute          6.03                   Immature Granulocytes Absolute, Au*   0.03                   Lymphocytes Absolute          1.79                   Monocytes Absolute            0.52                   Eosinophils Absolute          0.12                   Basophils Absolute            0.04                URINALYSIS WITH REFLEX CULTURE AND MICROSCOPIC       Narrative: The following orders were created for panel order Urinalysis with Reflex Culture and Microscopic.                Procedure                               Abnormality         Status                                    ---------                               -----------         ------                                   Urinalysis with Reflex C...[[id number]]                                                               Extra Urine Gray Tube[[id number]]                                                                                     Please view results for these tests on the individual orders.  URINALYSIS WITH REFLEX CULTURE AND MICROSCOPIC  EXTRA URINE GRAY TUBE  MORPHOLOGY   CT abdomen pelvis wo IV contrast   Final Result    1. Ascites in the abdomen and pelvis without pneumoperitoneum or    intraperitoneal abscess; correlate clinically.    2. Fat stranding involving the lower abdominal/pelvic subcutaneous    tissues extending inferiorly to the level of the perineum, with an    elongated fluid collection anteriorly to the left anterior pelvic    musculature, most likely a postsurgical seroma. Correlate clinically    and follow-up as needed.    3. Small hiatal hernia. Colonic diverticulosis without acute    diverticulitis.          Signed by: Reed Thomas 9/27/2024 1:05 PM    Dictation workstation:   [id number]     CT urography w 3D volume rendered imaging    (Results Pending)                         Tests/Medications/Escalations of Care considered but not given: As in MDM    Patient care discussed with: N/A  Social Determinants affecting care: N/A    Final diagnosis and disposition as documented          Shared decision making was completed and determined that patient will be hospitalized. I discussed the differential; results and admit plan with the patient and/or family/friend/caregiver if present.  I emphasized the importance of hospitalization need for re-evaluation/continued monitoring/interventions. They agreed that if they feel their condition is worsening or if they have any other concern they should alert staff immediately for further assistance. I gave the patient an opportunity to ask all questions  they had and answered all of them accordingly. The patient and/or family/friend/caregiver expressed understanding verbally and that they would comply.    Disposition:  Hospitalize to medical floor under Dr. lord per their orders. Discussed findings and treatment done here in ED with admitting physician. It would be a risk to discharge the patient in their condition due to possibility of deterioration in their condition and the need for urgent interventions.    This note has been transcribed using voice recognition and may contain grammatical errors, misplaced words, incorrect words, incorrect phrases or other errors.         Procedure  Procedures     Dennis Aranda PA-C  09/27/24 5517

## 2024-09-27 NOTE — ED TRIAGE NOTES
Patient has had lower abdominal pain since she had abdominal surgery on 9/16/24, patient states she has also had nausea with painful urination. Patient was sent in by her PCP for further evaluation.

## 2024-09-27 NOTE — ANESTHESIA POSTPROCEDURE EVALUATION
Patient: Verenice Sotelo    Procedure Summary       Date: 09/27/24 Room / Location: [facility] OR 06 / [facility]    Anesthesia Start: 1733 Anesthesia Stop: 1815    Procedure: Cystoscopy Rigid Diagnosis:       Injury of bladder, initial encounter      (Injury of bladder, initial encounter [S37.20XA])    Surgeons: Ruben Johnson MD Responsible Provider: Manjit Cole MD    Anesthesia Type: general ASA Status: 2 - Emergent            Anesthesia Type: general    Vitals Value Taken Time   BP See vitals 09/27/24 1817   Temp  09/27/24 1817   Pulse  09/27/24 1817   Resp  09/27/24 1817   SpO2  09/27/24 1817       Anesthesia Post Evaluation    Patient location during evaluation: PACU  Patient participation: complete - patient participated  Level of consciousness: awake  Pain management: adequate  Multimodal analgesia pain management approach  Airway patency: patent  Two or more strategies used to mitigate risk of obstructive sleep apnea  Cardiovascular status: acceptable  Respiratory status: acceptable  Hydration status: acceptable  Postoperative Nausea and Vomiting: none        No notable events documented.

## 2024-09-27 NOTE — OP NOTE
Cystoscopy Rigid Operative Note     Date: 2024  OR Location: [facility]    Name: Verenice Sotelo, : 1955, Age: 69 y.o., MRN: [id number], Sex: female    Diagnosis  Pre-op Diagnosis      * Injury of bladder, initial encounter [S37.20XA] Post-op Diagnosis     * Injury of bladder, initial encounter [S37.20XA]     Procedures  Cystoscopy Rigid  90559 - CO CYSTOURETHROSCOPY      Surgeons      * Ruben Johnson - Primary    Resident/Fellow/Other Assistant:  Surgeons and Role:  * No surgeons found with a matching role *    Procedure Summary  Anesthesia: General  ASA: II  Anesthesia Staff: Anesthesiologist: Manjit Cole MD  CRNA: ROLANDO Barr  Estimated Blood Loss:  0mL  Intra-op Medications:   Administrations occurring from  to  on 24:   Medication Name Total Dose   amLODIPine (Norvasc) tablet 5 mg Cannot be calculated   HYDROmorphone (Dilaudid) injection 0.25 mg 0.5 mg              Anesthesia Record               Intraprocedure I/O Totals          Intake    lactated Ringer's infusion 600.00 mL    Total Intake 600 mL          Specimen: No specimens collected     Staff:   Circulator: Umm Fungub Person: Bridgett         Drains and/or Catheters:   Urethral Catheter Non-latex 16 Fr. (Active)   Collection Container Standard drainage bag 24       Tourniquet Times:         Implants:     Findings: small area of eroded mesh noted along the passage of the left sling arm     Indications: Verenice Sotelo is an 69 y.o. female who is having surgery for Injury of bladder, initial encounter [S37.20XA].     The patient was seen in the preoperative area. The risks, benefits, complications, treatment options, non-operative alternatives, expected recovery and outcomes were discussed with the patient. The possibilities of reaction to medication, pulmonary aspiration, injury to surrounding structures, bleeding, recurrent infection, the need for additional procedures, failure to diagnose a  condition, and creating a complication requiring transfusion or operation were discussed with the patient. The patient concurred with the proposed plan, giving informed consent.  The site of surgery was properly noted/marked if necessary per policy. The patient has been actively warmed in preoperative area. Preoperative antibiotics have been ordered and given within 1 hours of incision. Venous thrombosis prophylaxis have been ordered including bilateral sequential compression devices    Procedure Details: After informed consent was obtained the patient was taken to the operating room where she was prepped in usual sterile fashion after being placed in dorsolithotomy.  A cystoscope was inserted in the bladder and the findings above are noted.  The bladder was then emptied and the cystoscope was removed    Complications:  None; patient tolerated the procedure well.    Disposition: PACU - hemodynamically stable.  Condition: stable         Additional Details:     Attending Attestation: I was present and scrubbed for the entire procedure.    Ruben Johnson  Phone Number: [phone]

## 2024-09-27 NOTE — PROGRESS NOTES
Pharmacy Medication History Review    Verenice Sotelo is a 69 y.o. female admitted for JOSE F (acute kidney injury) (CMS-HCC). Pharmacy reviewed the patient's ffeip-go-srhqttlim medications and allergies for accuracy.    The list below reflectives the updated PTA list. Please review each medication in order reconciliation for additional clarification and justification.  Prior to Admission Medications   Prescriptions Last Dose Informant Patient Reported? Taking?   acetaminophen (Tylenol) 500 mg tablet 9/27/2024 at am Self No Yes   Sig: Take 2 tablets (1,000 mg) by mouth every 6 hours if needed for mild pain (1 - 3) for up to 5 days.   amLODIPine (Norvasc) 5 mg tablet 9/27/2024 at am Self No Yes   Sig: Take 1 tablet (5 mg) by mouth once daily.   aspirin 81 mg EC tablet 9/8/2024 Self Yes No   Sig: Take 1 tablet (81 mg) by mouth once daily at bedtime.   atorvastatin (Lipitor) 40 mg tablet 9/8/2024 Self No Yes   Sig: Take 1 tablet (40 mg) by mouth once daily.   calcium carbonate-vitamin D3 600 mg-20 mcg (800 unit) tablet 9/8/2024 Self Yes No   Sig: Take 1 tablet by mouth 2 times a day.   docusate sodium (Colace) 100 mg capsule Unknown  No No   Sig: Take 1 capsule (100 mg) by mouth 2 times a day for 5 days.   doxycycline (Vibramycin) 100 mg capsule   No No   Sig: Take 1 capsule (100 mg) by mouth 2 times a day for 7 days.   estradiol (Estrace) 0.01 % (0.1 mg/gram) vaginal cream Unknown Self No No   Sig: Insert 0.5 Applicatorfuls (2 g) into the vagina once daily.   hydroCHLOROthiazide (Microzide) 12.5 mg tablet 9/8/2024 Self No No   Sig: Take 1 tablet (12.5 mg) by mouth once daily.   ketorolac (Toradol) 10 mg tablet   No No   Sig: Take 1 tablet (10 mg) by mouth every 6 hours if needed for moderate pain (4 - 6) for up to 5 days.   levothyroxine (Synthroid, Levoxyl) 100 mcg tablet 9/27/2024 at 03:30 Self No Yes   Sig: Take 1 tablet (100 mcg) by mouth once daily.   magnesium hydroxide (Milk of Magnesia) 400 mg/5 mL suspension  Unknown Self No No   Sig: Take 15 mL by mouth once daily as needed for constipation (if have not had a BM by postoperative day 3).   meloxicam (Mobic) 7.5 mg tablet Unknown Self No No   Sig: Take 1 tablet (7.5 mg) by mouth once daily.   multivitamin with minerals tablet 9/8/2024 Self Yes Yes   Sig: Take 2 tablets by mouth once daily.   mupirocin (Bactroban) 2 % ointment   No No   Sig: Apply topically 3 times a day for 10 days. apply to affected area   polyethylene glycol (Glycolax, Miralax) 17 gram/dose powder Unknown Self No No   Sig: DISSOLVE 17 GRAMS IN 8 OZ OF FLUID LIQUID DRINK DAILY AS DIRECTED   tamsulosin (Flomax) 0.4 mg 24 hr capsule  Self No No   Sig: Take 3 days before surgery and 7 days after   Patient not taking: Reported on 9/16/2024   traMADol (Ultram) 50 mg tablet Unknown  No No   Sig: Take 1 tablet (50 mg) by mouth every 6 hours if needed for severe pain (7 - 10) for up to 5 days.      Facility-Administered Medications: None           The list below reflectives the updated allergy list. Please review each documented allergy for additional clarification and justification.  Allergies  Reviewed by Hilario Gonzalez RN on 9/27/2024        Severity Reactions Comments    Lisinopril Not Specified Cough     Sulfa (sulfonamide Antibiotics) Low Other, Rash, Unknown             Below are additional concerns with the patient's PTA list.      Tana Meneses

## 2024-09-27 NOTE — PROGRESS NOTES
09/27/24 1404   Discharge Planning   Living Arrangements Spouse/significant other   Support Systems Spouse/significant other   Assistance Needed A&OX4; independent with ADLs with no DME; drives; room air baseline and currently room air   Type of Residence Private residence   Number of Stairs to Enter Residence 2   Number of Stairs Within Residence 25  (14 up/14 down)   Do you have animals or pets at home? Yes   Type of Animals or Pets 1 cat   Who is requesting discharge planning? Provider   Home or Post Acute Services None   Expected Discharge Disposition Home  (Pending workup but likely home no needs. Patient denies any dc needs at this time)   Does the patient need discharge transport arranged? No   Financial Resource Strain   How hard is it for you to pay for the very basics like food, housing, medical care, and heating? Not hard   Housing Stability   In the last 12 months, was there a time when you were not able to pay the mortgage or rent on time? N   In the past 12 months, how many times have you moved where you were living? 1   At any time in the past 12 months, were you homeless or living in a shelter (including now)? N   Transportation Needs   In the past 12 months, has lack of transportation kept you from medical appointments or from getting medications? no   In the past 12 months, has lack of transportation kept you from meetings, work, or from getting things needed for daily living? No     06/27/2024 1406pm  Spoke with patient and patient's spouse bedside in ED

## 2024-09-27 NOTE — ANESTHESIA PREPROCEDURE EVALUATION
Patient: Verenice Sotelo    Procedure Information       Date/Time: 09/27/24 1815    Procedures:       diagnostic Laparoscopy - case will take <1 hour      Cystoscopy Rigid    Location: [facility] OR 06 / [facility]    Surgeons: Ruben Johnson MD          Vitals:    09/27/24 1445   BP: 127/78   Pulse: 60   Resp: 20   Temp: 36.5 °C (97.7 °F)   SpO2: 97%       Past Surgical History:   Procedure Laterality Date    CATARACT EXTRACTION  06/11/2015    Cataract Surgery    HYSTERECTOMY N/A 09/16/2024    SACROCOLPOPEXY N/A 09/16/2024    TUBAL LIGATION  10/18/2013    Tubal Ligation    URETHRAL SLING N/A 09/16/2024     Past Medical History:   Diagnosis Date    Acute maxillary sinusitis, unspecified 10/26/2016    Acute maxillary sinusitis    Arthritis     Diverticulosis     Encounter for immunization 01/09/2020    Need for shingles vaccine    Encounter for immunization 09/19/2016    Need for shingles vaccine    Encounter for immunization 09/19/2016    Needs flu shot    Encounter for screening for other viral diseases 12/01/2017    Need for hepatitis C screening test    Female genital prolapse     Hyperlipidemia     Hypertension     Hypothyroidism     Midline cystocele     OAB (overactive bladder)     Osteopenia     Personal history of other specified conditions 04/23/2015    History of diarrhea    Raynaud's disease     Right carotid bruit     FRANCIS (stress urinary incontinence, female)     Wears glasses     Wears partial dentures     lower       Current Facility-Administered Medications:     acetaminophen (Tylenol) tablet 975 mg, 975 mg, oral, q6h PRN, Em Salcido MD    [START ON 9/28/2024] amLODIPine (Norvasc) tablet 5 mg, 5 mg, oral, Daily, Yony Salcido MD    [Held by provider] aspirin EC tablet 81 mg, 81 mg, oral, Nightly, Em Salcido MD    atorvastatin (Lipitor) tablet 40 mg, 40 mg, oral, Daily, Em Salcido MD    estradiol (Estrace) 0.01 % (0.1 mg/gram) vaginal cream 2 g, 2 g, vaginal, Daily, Em Salcido  MD    heparin (porcine) injection 5,000 Units, 5,000 Units, subcutaneous, q8h ADRY, Em Salcido MD    [Held by provider] hydroCHLOROthiazide (HYDRODiuril) tablet 12.5 mg, 12.5 mg, oral, Daily, Em Salcido MD    HYDROmorphone (Dilaudid) injection 0.4 mg, 0.4 mg, intravenous, q3h PRN, Em Salcido MD    [START ON 9/28/2024] levothyroxine (Synthroid, Levoxyl) tablet 100 mcg, 100 mcg, oral, Daily, Em Salcido MD    oxyCODONE (Roxicodone) immediate release tablet 5 mg, 5 mg, oral, q6h PRN, Em Salcido MD    traMADol (Ultram) tablet 50 mg, 50 mg, oral, q6h PRN, Em Salcido MD  Prior to Admission medications    Medication Sig Start Date End Date Taking? Authorizing Provider   acetaminophen (Tylenol) 500 mg tablet Take 2 tablets (1,000 mg) by mouth every 6 hours if needed for mild pain (1 - 3) for up to 5 days. 9/16/24 9/27/24 Yes Sameera Jaime PA-C   amLODIPine (Norvasc) 5 mg tablet Take 1 tablet (5 mg) by mouth once daily. 4/15/24 4/10/25 Yes Fermin SOTELO DO   levothyroxine (Synthroid, Levoxyl) 100 mcg tablet Take 1 tablet (100 mcg) by mouth once daily. 4/16/24 4/16/25 Yes Fermin Bunch V DO   aspirin 81 mg EC tablet Take 1 tablet (81 mg) by mouth once daily at bedtime. 10/27/16   Historical Provider, MD   atorvastatin (Lipitor) 40 mg tablet Take 1 tablet (40 mg) by mouth once daily. 4/15/24 4/10/25  Fermin SOTELO DO   calcium carbonate-vitamin D3 600 mg-20 mcg (800 unit) tablet Take 1 tablet by mouth 2 times a day.    Historical Provider, MD   docusate sodium (Colace) 100 mg capsule Take 1 capsule (100 mg) by mouth 2 times a day for 5 days. 9/16/24 9/21/24  Sameera Jaime PA-C   doxycycline (Vibramycin) 100 mg capsule Take 1 capsule (100 mg) by mouth 2 times a day for 7 days. 9/13/24 9/20/24  Froy Reyes DO   estradiol (Estrace) 0.01 % (0.1 mg/gram) vaginal cream Insert 0.5 Applicatorfuls (2 g) into the vagina once daily. 9/19/24 9/19/25  Ruben Johnson MD   hydroCHLOROthiazide  (Microzide) 12.5 mg tablet Take 1 tablet (12.5 mg) by mouth once daily. 4/15/24   Fermin Bunch V DO   ketorolac (Toradol) 10 mg tablet Take 1 tablet (10 mg) by mouth every 6 hours if needed for moderate pain (4 - 6) for up to 5 days. 9/16/24 9/21/24  Sameera Jaime PA-C   magnesium hydroxide (Milk of Magnesia) 400 mg/5 mL suspension Take 15 mL by mouth once daily as needed for constipation (if have not had a BM by postoperative day 3). 9/16/24   Sameera Jaime PA-C   meloxicam (Mobic) 7.5 mg tablet Take 1 tablet (7.5 mg) by mouth once daily. 4/15/24 4/15/25  Fermin Bunch V DO   multivitamin with minerals tablet Take 2 tablets by mouth once daily.    Historical Provider, MD   mupirocin (Bactroban) 2 % ointment Apply topically 3 times a day for 10 days. apply to affected area 9/13/24 9/23/24  Froy Reyes DO   polyethylene glycol (Glycolax, Miralax) 17 gram/dose powder DISSOLVE 17 GRAMS IN 8 OZ OF FLUID LIQUID DRINK DAILY AS DIRECTED 9/17/24   Sameera Jaime PA-C   tamsulosin (Flomax) 0.4 mg 24 hr capsule Take 3 days before surgery and 7 days after  Patient not taking: Reported on 9/16/2024 8/26/24   Ruben Johnson MD   traMADol (Ultram) 50 mg tablet Take 1 tablet (50 mg) by mouth every 6 hours if needed for severe pain (7 - 10) for up to 5 days. 9/16/24 9/21/24  Sameera Jaime PA-C   POTASSIUM GLUCONATE ORAL Take 1 tablet by mouth once daily.  9/27/24  Historical Provider, MD     Allergies   Allergen Reactions    Lisinopril Cough    Sulfa (Sulfonamide Antibiotics) Other, Rash and Unknown     Social History     Tobacco Use    Smoking status: Never    Smokeless tobacco: Never   Substance Use Topics    Alcohol use: Never         Chemistry    Lab Results   Component Value Date/Time     09/27/2024 1147    K 4.4 09/27/2024 1147     09/27/2024 1147    CO2 25 09/27/2024 1147    BUN 42 (H) 09/27/2024 1147    CREATININE 2.75 (H) 09/27/2024 1147    Lab Results   Component Value Date/Time    CALCIUM  9.0 09/27/2024 1147    ALKPHOS 89 09/27/2024 1147    AST 18 09/27/2024 1147    ALT 15 09/27/2024 1147    BILITOT 0.4 09/27/2024 1147          Lab Results   Component Value Date/Time    WBC 8.5 09/27/2024 1147    HGB 12.3 09/27/2024 1147    HCT 36.9 09/27/2024 1147     09/27/2024 1147     Lab Results   Component Value Date/Time    PROTIME 11.7 09/27/2024 1147    INR 1.0 09/27/2024 1147     Encounter Date: 08/30/24   ECG 12 Lead   Result Value    Ventricular Rate 62    Atrial Rate 62    WA Interval 176    QRS Duration 74    QT Interval 410    QTC Calculation(Bazett) 416    P Axis 48    R Axis 36    T Axis 61    QRS Count 10    Q Onset 222    P Onset 134    P Offset 167    T Offset 427    QTC Fredericia 414    Narrative    Normal sinus rhythm  Low voltage QRS  Borderline ECG  When compared with ECG of 23-OCT-2016 12:42,  Nonspecific T wave abnormality now evident in Lateral leads     No results found for this or any previous visit from the past 1095 days.      Relevant Problems   Cardiac   (+) Benign essential hypertension   (+) Familial hypercholesteremia   (+) Hyperlipidemia      Endocrine   (+) Hypothyroidism       Clinical information reviewed:   Tobacco  Allergies  Meds   Med Hx  Surg Hx   Fam Hx  Soc Hx        NPO Detail:  No data recorded     Physical Exam    Airway  Mallampati: II     Cardiovascular - normal exam     Dental    Pulmonary    Abdominal            Anesthesia Plan    History of general anesthesia?: yes  History of complications of general anesthesia?: no    ASA 2 - emergent     general     The patient is not a current smoker.  Patient was not previously instructed to abstain from smoking on day of procedure.  Patient did not smoke on day of procedure.  Education provided regarding risk of obstructive sleep apnea.  intravenous induction   Anesthetic plan and risks discussed with patient.    Plan discussed with CRNA.

## 2024-09-28 LAB
ALBUMIN SERPL BCP-MCNC: 3.4 G/DL (ref 3.4–5)
ANION GAP SERPL CALC-SCNC: 13 MMOL/L (ref 10–20)
BUN SERPL-MCNC: 32 MG/DL (ref 6–23)
CALCIUM SERPL-MCNC: 8.8 MG/DL (ref 8.6–10.3)
CHLORIDE SERPL-SCNC: 104 MMOL/L (ref 98–107)
CHLORIDE UR-SCNC: 36 MMOL/L
CHLORIDE/CREATININE (MMOL/G) IN URINE: 179 MMOL/G CREAT (ref 38–318)
CO2 SERPL-SCNC: 24 MMOL/L (ref 21–32)
CREAT SERPL-MCNC: 1.88 MG/DL (ref 0.5–1.05)
CREAT UR-MCNC: 20.1 MG/DL (ref 20–320)
EGFRCR SERPLBLD CKD-EPI 2021: 28 ML/MIN/1.73M*2
ERYTHROCYTE [DISTWIDTH] IN BLOOD BY AUTOMATED COUNT: 11.9 % (ref 11.5–14.5)
GLUCOSE SERPL-MCNC: 124 MG/DL (ref 74–99)
HCT VFR BLD AUTO: 36.1 % (ref 36–46)
HGB BLD-MCNC: 11.7 G/DL (ref 12–16)
HOLD SPECIMEN: NORMAL
MAGNESIUM SERPL-MCNC: 2.31 MG/DL (ref 1.6–2.4)
MCH RBC QN AUTO: 30.2 PG (ref 26–34)
MCHC RBC AUTO-ENTMCNC: 32.4 G/DL (ref 32–36)
MCV RBC AUTO: 93 FL (ref 80–100)
NRBC BLD-RTO: 0 /100 WBCS (ref 0–0)
OSMOLALITY UR: 162 MOSM/KG (ref 200–1200)
PHOSPHATE SERPL-MCNC: 3.6 MG/DL (ref 2.5–4.9)
PLATELET # BLD AUTO: 344 X10*3/UL (ref 150–450)
POTASSIUM SERPL-SCNC: 4.9 MMOL/L (ref 3.5–5.3)
POTASSIUM UR-SCNC: 11 MMOL/L
POTASSIUM/CREAT UR-RTO: 55 MMOL/G CREAT
RBC # BLD AUTO: 3.88 X10*6/UL (ref 4–5.2)
SODIUM SERPL-SCNC: 136 MMOL/L (ref 136–145)
SODIUM UR-SCNC: 38 MMOL/L
SODIUM/CREAT UR-RTO: 189 MMOL/G CREAT
WBC # BLD AUTO: 12.1 X10*3/UL (ref 4.4–11.3)

## 2024-09-28 PROCEDURE — 36415 COLL VENOUS BLD VENIPUNCTURE: CPT | Performed by: STUDENT IN AN ORGANIZED HEALTH CARE EDUCATION/TRAINING PROGRAM

## 2024-09-28 PROCEDURE — 2500000004 HC RX 250 GENERAL PHARMACY W/ HCPCS (ALT 636 FOR OP/ED)

## 2024-09-28 PROCEDURE — 85027 COMPLETE CBC AUTOMATED: CPT | Performed by: STUDENT IN AN ORGANIZED HEALTH CARE EDUCATION/TRAINING PROGRAM

## 2024-09-28 PROCEDURE — 2500000001 HC RX 250 WO HCPCS SELF ADMINISTERED DRUGS (ALT 637 FOR MEDICARE OP): Performed by: STUDENT IN AN ORGANIZED HEALTH CARE EDUCATION/TRAINING PROGRAM

## 2024-09-28 PROCEDURE — 2500000004 HC RX 250 GENERAL PHARMACY W/ HCPCS (ALT 636 FOR OP/ED): Performed by: STUDENT IN AN ORGANIZED HEALTH CARE EDUCATION/TRAINING PROGRAM

## 2024-09-28 PROCEDURE — 1100000001 HC PRIVATE ROOM DAILY

## 2024-09-28 PROCEDURE — 2500000001 HC RX 250 WO HCPCS SELF ADMINISTERED DRUGS (ALT 637 FOR MEDICARE OP)

## 2024-09-28 PROCEDURE — 99024 POSTOP FOLLOW-UP VISIT: CPT | Performed by: STUDENT IN AN ORGANIZED HEALTH CARE EDUCATION/TRAINING PROGRAM

## 2024-09-28 PROCEDURE — 83735 ASSAY OF MAGNESIUM: CPT | Performed by: STUDENT IN AN ORGANIZED HEALTH CARE EDUCATION/TRAINING PROGRAM

## 2024-09-28 PROCEDURE — 99232 SBSQ HOSP IP/OBS MODERATE 35: CPT

## 2024-09-28 PROCEDURE — 80069 RENAL FUNCTION PANEL: CPT | Performed by: STUDENT IN AN ORGANIZED HEALTH CARE EDUCATION/TRAINING PROGRAM

## 2024-09-28 RX ORDER — AMOXICILLIN 250 MG
1 CAPSULE ORAL 2 TIMES DAILY
Status: DISCONTINUED | OUTPATIENT
Start: 2024-09-28 | End: 2024-09-29

## 2024-09-28 RX ORDER — POLYETHYLENE GLYCOL 3350 17 G/17G
17 POWDER, FOR SOLUTION ORAL DAILY
Status: DISCONTINUED | OUTPATIENT
Start: 2024-09-28 | End: 2024-09-30 | Stop reason: HOSPADM

## 2024-09-28 RX ORDER — TRAMADOL HYDROCHLORIDE 50 MG/1
50 TABLET ORAL EVERY 6 HOURS PRN
Status: DISCONTINUED | OUTPATIENT
Start: 2024-09-28 | End: 2024-09-29

## 2024-09-28 ASSESSMENT — COGNITIVE AND FUNCTIONAL STATUS - GENERAL
MOVING TO AND FROM BED TO CHAIR: A LITTLE
STANDING UP FROM CHAIR USING ARMS: A LITTLE
PERSONAL GROOMING: A LITTLE
DAILY ACTIVITIY SCORE: 18
WALKING IN HOSPITAL ROOM: A LITTLE
DRESSING REGULAR UPPER BODY CLOTHING: A LITTLE
TOILETING: A LITTLE
MOBILITY SCORE: 19
CLIMB 3 TO 5 STEPS WITH RAILING: A LOT
HELP NEEDED FOR BATHING: A LITTLE
DRESSING REGULAR LOWER BODY CLOTHING: A LOT

## 2024-09-28 ASSESSMENT — PAIN DESCRIPTION - ORIENTATION: ORIENTATION: LOWER

## 2024-09-28 ASSESSMENT — PAIN - FUNCTIONAL ASSESSMENT
PAIN_FUNCTIONAL_ASSESSMENT: 0-10

## 2024-09-28 ASSESSMENT — PAIN SCALES - GENERAL
PAINLEVEL_OUTOF10: 10 - WORST POSSIBLE PAIN
PAINLEVEL_OUTOF10: 8
PAINLEVEL_OUTOF10: 0 - NO PAIN
PAINLEVEL_OUTOF10: 4

## 2024-09-28 NOTE — CARE PLAN
The patient's goals for the shift include      The clinical goals for the shift include The client will report decreased severity of nausea/vomiting      Problem: Fall/Injury  Goal: Not fall by end of shift  Outcome: Progressing  Goal: Be free from injury by end of the shift  Outcome: Progressing  Goal: Verbalize understanding of personal risk factors for fall in the hospital  Outcome: Progressing  Goal: Verbalize understanding of risk factor reduction measures to prevent injury from fall in the home  Outcome: Progressing  Goal: Use assistive devices by end of the shift  Outcome: Progressing  Goal: Pace activities to prevent fatigue by end of the shift  Outcome: Progressing

## 2024-09-28 NOTE — HOSPITAL COURSE
Verenice Sotelo is a 69 year old female with a PMH of HTN, hyperlipidemia, hypothyroidism, Raynauds, genital prolapse s/p bladder sling placement who presented to the hospital with lower abdominal pain and vulvar pain/swelling. Patient had a sling placement, hysterectomy, and laparoscopic colpopexy procedures done by Dr. Johnson for genital prolapse and urinary incontinence on 9/16, and since the 19th, her pain and overall discomfort continues to worsen. CT cystogram obtained showed bladder wall rupture, Dr. Johnson immediately came to repair the bladder. Teresa catheter was placed Patient was also found to have elevated creatinine 2/2 the urinoma, and continued to improve with fluids. Patient's BP regimen was modified after discussion with her to discontinue the hydrochlorothiazide and her amlodipine was held.  On the morning of 09/29 patient continued to have worsening abdominal pain.  CTAP done at that time showed subcutaneous gas on the left side of mons pubis and tracking with new gas bubbles.  This could have been likely secondary to recent instrumentation.  After discussion with Dr. Johnson, patient was started on clindamycin.  Given that patient required additional surgical intervention specifically with retroperitoneal approach, transfer to [facility] was initiated.      Will continue to maintain Teresa  and will follow up outpatient with urology to have Teresa removed.

## 2024-09-28 NOTE — CARE PLAN
The patient's goals for the shift include  pain management and increased mobility.    The clinical goals for the shift include Patient will learn about home reed care this shift.    Over the shift, the patient did make progress toward those goals, patient tearful and anxious about reed at home as well as outlook of an additional surgery and her own mortality. Emotional support offered, education when appropriate. Barriers to progression include emotional distress.

## 2024-09-28 NOTE — PROGRESS NOTES
"Verenice Sotelo is a 69 y.o. female on day 1 of admission presenting with JOSE F (acute kidney injury) (CMS-[facility]).    Subjective   Feeling much better today, cysto showed small mesh erosion in position of left sling arm, likely source of fluid in abdomen and soft tissue        Objective     Physical Exam    Last Recorded Vitals  Blood pressure 103/63, pulse 56, temperature 36.1 °C (97 °F), temperature source Temporal, resp. rate 16, height 1.702 m (5' 7\"), weight 72.6 kg (160 lb), SpO2 95%.  Intake/Output last 3 Shifts:  I/O last 3 completed shifts:  In: 978.3 (13.5 mL/kg) [P.O.:60; I.V.:918.3 (12.7 mL/kg)]  Out: 2725 (37.5 mL/kg) [Urine:2725 (1 mL/kg/hr)]  Weight: 72.6 kg     Relevant Results  Scheduled medications  [Held by provider] amLODIPine, 5 mg, oral, Daily  aspirin, 81 mg, oral, Nightly  atorvastatin, 40 mg, oral, Daily  estradiol, 2 g, vaginal, Daily  heparin (porcine), 5,000 Units, subcutaneous, q8h ADRY  [Held by provider] hydroCHLOROthiazide, 12.5 mg, oral, Daily  levothyroxine, 100 mcg, oral, Daily  polyethylene glycol, 17 g, oral, Daily      Continuous medications     PRN medications  PRN medications: acetaminophen, HYDROmorphone, oxyCODONE, traMADol    Results for orders placed or performed during the hospital encounter of 09/27/24 (from the past 24 hour(s))   CBC and Auto Differential   Result Value Ref Range    WBC 8.5 4.4 - 11.3 x10*3/uL    nRBC 0.0 0.0 - 0.0 /100 WBCs    RBC 4.04 4.00 - 5.20 x10*6/uL    Hemoglobin 12.3 12.0 - 16.0 g/dL    Hematocrit 36.9 36.0 - 46.0 %    MCV 91 80 - 100 fL    MCH 30.4 26.0 - 34.0 pg    MCHC 33.3 32.0 - 36.0 g/dL    RDW 11.9 11.5 - 14.5 %    Platelets 321 150 - 450 x10*3/uL    Neutrophils % 70.6 40.0 - 80.0 %    Immature Granulocytes %, Automated 0.4 0.0 - 0.9 %    Lymphocytes % 21.0 13.0 - 44.0 %    Monocytes % 6.1 2.0 - 10.0 %    Eosinophils % 1.4 0.0 - 6.0 %    Basophils % 0.5 0.0 - 2.0 %    Neutrophils Absolute 6.03 1.20 - 7.70 x10*3/uL    Immature Granulocytes " Absolute, Automated 0.03 0.00 - 0.70 x10*3/uL    Lymphocytes Absolute 1.79 1.20 - 4.80 x10*3/uL    Monocytes Absolute 0.52 0.10 - 1.00 x10*3/uL    Eosinophils Absolute 0.12 0.00 - 0.70 x10*3/uL    Basophils Absolute 0.04 0.00 - 0.10 x10*3/uL   Comprehensive metabolic panel   Result Value Ref Range    Glucose 88 74 - 99 mg/dL    Sodium 136 136 - 145 mmol/L    Potassium 4.4 3.5 - 5.3 mmol/L    Chloride 103 98 - 107 mmol/L    Bicarbonate 25 21 - 32 mmol/L    Anion Gap 12 10 - 20 mmol/L    Urea Nitrogen 42 (H) 6 - 23 mg/dL    Creatinine 2.75 (H) 0.50 - 1.05 mg/dL    eGFR 18 (L) >60 mL/min/1.73m*2    Calcium 9.0 8.6 - 10.3 mg/dL    Albumin 3.7 3.4 - 5.0 g/dL    Alkaline Phosphatase 89 33 - 136 U/L    Total Protein 6.6 6.4 - 8.2 g/dL    AST 18 9 - 39 U/L    Bilirubin, Total 0.4 0.0 - 1.2 mg/dL    ALT 15 7 - 45 U/L   Protime-INR   Result Value Ref Range    Protime 11.7 9.8 - 12.8 seconds    INR 1.0 0.9 - 1.1   Lactate   Result Value Ref Range    Lactate 0.7 0.4 - 2.0 mmol/L   Morphology   Result Value Ref Range    RBC Morphology No significant RBC morphology present    Urinalysis with Reflex Culture and Microscopic   Result Value Ref Range    Color, Urine Colorless (N) Light-Yellow, Yellow, Dark-Yellow    Appearance, Urine Clear Clear    Specific Gravity, Urine 1.004 (N) 1.005 - 1.035    pH, Urine 6.0 5.0, 5.5, 6.0, 6.5, 7.0, 7.5, 8.0    Protein, Urine NEGATIVE NEGATIVE, 10 (TRACE), 20 (TRACE) mg/dL    Glucose, Urine Normal Normal mg/dL    Blood, Urine NEGATIVE NEGATIVE    Ketones, Urine NEGATIVE NEGATIVE mg/dL    Bilirubin, Urine NEGATIVE NEGATIVE    Urobilinogen, Urine Normal Normal mg/dL    Nitrite, Urine NEGATIVE NEGATIVE    Leukocyte Esterase, Urine 75 Evelia/µL (A) NEGATIVE   Extra Urine Gray Tube   Result Value Ref Range    Extra Tube Hold for add-ons.    Microscopic Only, Urine   Result Value Ref Range    WBC, Urine 6-10 (A) 1-5, NONE /HPF    RBC, Urine 1-2 NONE, 1-2, 3-5 /HPF    Squamous Epithelial Cells, Urine 1-9  (SPARSE) Reference range not established. /HPF   Urine electrolytes   Result Value Ref Range    Sodium, Urine Random 38 mmol/L    Sodium/Creatinine Ratio 189 Not established. mmol/g Creat    Potassium, Urine Random 11 mmol/L    Potassium/Creatinine Ratio 55 Not established mmol/g Creat    Chloride, Urine Random 36 mmol/L    Chloride/Creatinine Ratio 179 38 - 318 mmol/g creat    Creatinine, Urine Random 20.1 20.0 - 320.0 mg/dL   Osmolality, urine   Result Value Ref Range    Osmolality, Urine Random 162 (L) 200 - 1,200 mOsm/kg   CBC   Result Value Ref Range    WBC 12.1 (H) 4.4 - 11.3 x10*3/uL    nRBC 0.0 0.0 - 0.0 /100 WBCs    RBC 3.88 (L) 4.00 - 5.20 x10*6/uL    Hemoglobin 11.7 (L) 12.0 - 16.0 g/dL    Hematocrit 36.1 36.0 - 46.0 %    MCV 93 80 - 100 fL    MCH 30.2 26.0 - 34.0 pg    MCHC 32.4 32.0 - 36.0 g/dL    RDW 11.9 11.5 - 14.5 %    Platelets 344 150 - 450 x10*3/uL   Renal Function Panel   Result Value Ref Range    Glucose 124 (H) 74 - 99 mg/dL    Sodium 136 136 - 145 mmol/L    Potassium 4.9 3.5 - 5.3 mmol/L    Chloride 104 98 - 107 mmol/L    Bicarbonate 24 21 - 32 mmol/L    Anion Gap 13 10 - 20 mmol/L    Urea Nitrogen 32 (H) 6 - 23 mg/dL    Creatinine 1.88 (H) 0.50 - 1.05 mg/dL    eGFR 28 (L) >60 mL/min/1.73m*2    Calcium 8.8 8.6 - 10.3 mg/dL    Phosphorus 3.6 2.5 - 4.9 mg/dL    Albumin 3.4 3.4 - 5.0 g/dL   Magnesium   Result Value Ref Range    Magnesium 2.31 1.60 - 2.40 mg/dL       CT cystogram    Result Date: 9/27/2024  Interpreted By:  Rosy Nash, STUDY: CT CYSTOGRAM;  9/27/2024 4:15 pm   INDICATION: Signs/Symptoms:Increased suprapubic pain s/p sling placement and laparoscopic supracervical hysterectomy on 09/16/2024     COMPARISON: None.   ACCESSION NUMBER(S): [id number]   ORDERING CLINICIAN: RENEA VENTURA   TECHNIQUE: CT examination of the pelvis is performed with sagittal and coronal reformations completed. Initially, images were obtained with no contrast administered. Subsequently, 50 mL of dilute  contrast was instilled through the Teresa catheter into the urinary bladder for the study.   FINDINGS: There is a Teresa catheter satisfactorily positioned within the urinary bladder. With instillation of contrast through the Teresa catheter, there is extraperitoneal contrast extravasation with the site of bladder rupture identified along the left anterolateral wall of the urinary bladder. There is communication of the extraperitoneal extravasation of the urine with the left inguinal canal with a small tract of contrast extending into the left inguinal region. There is also a small tract of contrast extending into a subcutaneous fluid collection located just external to the rectus sheath inferiorly.   In addition, there is intraperitoneal fluid within the pelvis and along the pericolic gutters, right more so than left. However, the intraperitoneal fluid does not exhibit contrast opacification after filling of the urinary bladder with the dilute contrast. There is also fluid accumulated within the subcutaneous tissues of each flank with induration and edema of the mons pubis and with fluid accumulated along the left side of the mons pubis.   The uterus has been resected.       1.  There is an extraperitoneal bladder rupture identified along the left anterolateral wall of the urinary bladder. The extraperitoneal rupture also exhibits 2 tracts extending into the left inguinal region as well as extending into a deep subcutaneous fluid collection seen just external to the rectus sheath inferiorly. There is edema of the subcutaneous tissues along the mons pubis particularly along the left side of the mons pubis. 2. There is also free intraperitoneal fluid within the pelvis and along the pericolic gutters without opacification following contrast instillation into the urinary bladder. The free intraperitoneal fluid is most likely normal postoperative finding.   MACRO: None   Signed by: Rosy Nash 9/27/2024 4:58 PM Dictation  workstation:   [id number]    CT abdomen pelvis wo IV contrast    Result Date: 9/27/2024  Interpreted By:  Reed Thomas, STUDY: CT ABDOMEN PELVIS WO IV CONTRAST; 9/27/2024 12:37 pm   INDICATION: Signs/Symptoms:lower abd pain and swelling with recent surgery   COMPARISON: None   ACCESSION NUMBER(S): [id number]   ORDERING CLINICIAN: KAREN BANDA   TECHNIQUE: Spiral axial unenhanced images were obtained from the upper abdomen to the symphysis pubis. Oral contrast was not administered.   All CT examinations are performed with one or more of the following dose reduction techniques: Automated Exposure Control, adjustment of mA and/or kV according to patient size, or use of iterative reconstruction techniques.   FINDINGS: Lung bases: The lung bases are clear. Liver: Normal in size without focal lesions. Gallbladder: Grossly unremarkable. Spleen: Normal in size without focal lesions. Pancreas: Unremarkable. Adrenal glands: No focal lesions. The kidneys are normal in size and position. There are no renal calculi or hydronephrosis. No abnormal calcifications are seen within the course of the ureters or within the bladder. Pelvic reproductive organs: Unremarkable.   Small hiatal hernia is noted. Several colonic diverticula are present, without acute diverticulitis. There is no bowel obstruction. The appendix is seen and appears normal. There is free fluid in the abdomen and pelvis, however there is no pneumoperitoneum or abscess. There is also fat stranding of the subcutaneous soft tissues of the anterior pelvic wall, extending inferiorly to the perineum. There is also elongated fluid collection anteriorly to the left anterior pelvic wall measuring up to 2 cm in thickness, most likely a postsurgical seroma. Mild atherosclerotic calcifications involve the aorta, without focal aneurysm.       1. Ascites in the abdomen and pelvis without pneumoperitoneum or intraperitoneal abscess; correlate clinically. 2. Fat stranding  involving the lower abdominal/pelvic subcutaneous tissues extending inferiorly to the level of the perineum, with an elongated fluid collection anteriorly to the left anterior pelvic musculature, most likely a postsurgical seroma. Correlate clinically and follow-up as needed. 3. Small hiatal hernia. Colonic diverticulosis without acute diverticulitis.   Signed by: Reed Thomas 9/27/2024 1:05 PM Dictation workstation:   [id number]    ECG 12 Lead    Result Date: 9/3/2024  Normal sinus rhythm Low voltage QRS Borderline ECG When compared with ECG of 23-OCT-2016 12:42, Nonspecific T wave abnormality now evident in Lateral leads                             Assessment/Plan   Assessment & Plan  JOSE F (acute kidney injury) (CMS-HCC)    Bladder injury    S/p lap nayeli, scp, and sling with left arm of sling erosion into bladder wall     Plan on consult with Dr. Silva as outpatient with endoscopic vs abdominal excision     Ruben Johnson MD

## 2024-09-28 NOTE — PROGRESS NOTES
Verenice Sotelo is a 69 y.o. female on day 1 of admission presenting with JOSE F (acute kidney injury) (CMS-[facility]).      Subjective   Patient tolerated procedure well with no post-op complications. Patient reports feeling better however still reports some suprapubic tenderness to palpation. Inquiring about blood pressure medications as she doesn't want to be on the HCTZ. Patient reports feeling better with Teresa catheter placement as well.     Objective     Last Recorded Vitals  /63 (BP Location: Right arm, Patient Position: Lying)   Pulse 56   Temp 36.1 °C (97 °F) (Temporal)   Resp 16   Wt 72.6 kg (160 lb)   SpO2 95%   Intake/Output last 3 Shifts:    Intake/Output Summary (Last 24 hours) at 9/28/2024 1228  Last data filed at 9/28/2024 1115  Gross per 24 hour   Intake 1218.33 ml   Output 3825 ml   Net -2606.67 ml       Admission Weight  Weight: 72.6 kg (160 lb) (09/27/24 1140)    Daily Weight  09/27/24 : 72.6 kg (160 lb)    Image Results  CT cystogram  Narrative: Interpreted By:  Rosy Nash,   STUDY:  CT CYSTOGRAM;  9/27/2024 4:15 pm      INDICATION:  Signs/Symptoms:Increased suprapubic pain s/p sling placement and  laparoscopic supracervical hysterectomy on 09/16/2024          COMPARISON:  None.      ACCESSION NUMBER(S):  [id number]      ORDERING CLINICIAN:  RENEA VENTURA      TECHNIQUE:  CT examination of the pelvis is performed with sagittal and coronal  reformations completed. Initially, images were obtained with no  contrast administered. Subsequently, 50 mL of dilute contrast was  instilled through the Teresa catheter into the urinary bladder for the  study.      FINDINGS:  There is a Teresa catheter satisfactorily positioned within the  urinary bladder. With instillation of contrast through the Teresa  catheter, there is extraperitoneal contrast extravasation with the  site of bladder rupture identified along the left anterolateral wall  of the urinary bladder. There is communication of the  extraperitoneal  extravasation of the urine with the left inguinal canal with a small  tract of contrast extending into the left inguinal region. There is  also a small tract of contrast extending into a subcutaneous fluid  collection located just external to the rectus sheath inferiorly.      In addition, there is intraperitoneal fluid within the pelvis and  along the pericolic gutters, right more so than left. However, the  intraperitoneal fluid does not exhibit contrast opacification after  filling of the urinary bladder with the dilute contrast. There is  also fluid accumulated within the subcutaneous tissues of each flank  with induration and edema of the mons pubis and with fluid  accumulated along the left side of the mons pubis.      The uterus has been resected.      Impression: 1.  There is an extraperitoneal bladder rupture identified along the  left anterolateral wall of the urinary bladder. The extraperitoneal  rupture also exhibits 2 tracts extending into the left inguinal  region as well as extending into a deep subcutaneous fluid collection  seen just external to the rectus sheath inferiorly. There is edema of  the subcutaneous tissues along the mons pubis particularly along the  left side of the mons pubis.  2. There is also free intraperitoneal fluid within the pelvis and  along the pericolic gutters without opacification following contrast  instillation into the urinary bladder. The free intraperitoneal fluid  is most likely normal postoperative finding.      MACRO:  None      Signed by: Rosy Nash 9/27/2024 4:58 PM  Dictation workstation:   [id number]  CT abdomen pelvis wo IV contrast  Narrative: Interpreted By:  Reed Thomas,   STUDY:  CT ABDOMEN PELVIS WO IV CONTRAST; 9/27/2024 12:37 pm      INDICATION:  Signs/Symptoms:lower abd pain and swelling with recent surgery      COMPARISON:  None      ACCESSION NUMBER(S):  [id number]      ORDERING CLINICIAN:  KAREN BANDA       TECHNIQUE:  Spiral axial unenhanced images were obtained from the upper abdomen  to the symphysis pubis. Oral contrast was not administered.      All CT examinations are performed with one or more of the following  dose reduction techniques: Automated Exposure Control, adjustment of  mA and/or kV according to patient size, or use of iterative  reconstruction techniques.      FINDINGS:  Lung bases: The lung bases are clear.  Liver: Normal in size without focal lesions.  Gallbladder: Grossly unremarkable.  Spleen: Normal in size without focal lesions.  Pancreas: Unremarkable.  Adrenal glands: No focal lesions.  The kidneys are normal in size and position.  There are no renal calculi or hydronephrosis.  No abnormal calcifications are seen within the course of the ureters  or within the bladder. Pelvic reproductive organs: Unremarkable.      Small hiatal hernia is noted. Several colonic diverticula are  present, without acute diverticulitis. There is no bowel obstruction.  The appendix is seen and appears normal. There is free fluid in the  abdomen and pelvis, however there is no pneumoperitoneum or abscess.  There is also fat stranding of the subcutaneous soft tissues of the  anterior pelvic wall, extending inferiorly to the perineum. There is  also elongated fluid collection anteriorly to the left anterior  pelvic wall measuring up to 2 cm in thickness, most likely a  postsurgical seroma. Mild atherosclerotic calcifications involve the  aorta, without focal aneurysm.      Impression: 1. Ascites in the abdomen and pelvis without pneumoperitoneum or  intraperitoneal abscess; correlate clinically.  2. Fat stranding involving the lower abdominal/pelvic subcutaneous  tissues extending inferiorly to the level of the perineum, with an  elongated fluid collection anteriorly to the left anterior pelvic  musculature, most likely a postsurgical seroma. Correlate clinically  and follow-up as needed.  3. Small hiatal hernia.  Colonic diverticulosis without acute  diverticulitis.      Signed by: Reed Thomas 9/27/2024 1:05 PM  Dictation workstation:   [id number]      Physical Exam  Vitals reviewed.   Constitutional:       General: She is not in acute distress.     Appearance: She is not ill-appearing.   Cardiovascular:      Rate and Rhythm: Normal rate and regular rhythm.      Heart sounds: Normal heart sounds. No murmur heard.  Pulmonary:      Effort: No respiratory distress.      Breath sounds: Normal breath sounds. No wheezing.   Abdominal:      General: There is no distension.      Palpations: Abdomen is soft.      Tenderness: There is abdominal tenderness (Mild tenderness to palpation in the suprapubic region).   Neurological:      Mental Status: She is alert.         Relevant Results  Scheduled medications  [Held by provider] amLODIPine, 5 mg, oral, Daily  aspirin, 81 mg, oral, Nightly  atorvastatin, 40 mg, oral, Daily  estradiol, 2 g, vaginal, Daily  heparin (porcine), 5,000 Units, subcutaneous, q8h ADRY  levothyroxine, 100 mcg, oral, Daily  polyethylene glycol, 17 g, oral, Daily  sennosides-docusate sodium, 1 tablet, oral, BID      Continuous medications     PRN medications  PRN medications: acetaminophen, HYDROmorphone, traMADol    Results for orders placed or performed during the hospital encounter of 09/27/24 (from the past 24 hour(s))   Urinalysis with Reflex Culture and Microscopic   Result Value Ref Range    Color, Urine Colorless (N) Light-Yellow, Yellow, Dark-Yellow    Appearance, Urine Clear Clear    Specific Gravity, Urine 1.004 (N) 1.005 - 1.035    pH, Urine 6.0 5.0, 5.5, 6.0, 6.5, 7.0, 7.5, 8.0    Protein, Urine NEGATIVE NEGATIVE, 10 (TRACE), 20 (TRACE) mg/dL    Glucose, Urine Normal Normal mg/dL    Blood, Urine NEGATIVE NEGATIVE    Ketones, Urine NEGATIVE NEGATIVE mg/dL    Bilirubin, Urine NEGATIVE NEGATIVE    Urobilinogen, Urine Normal Normal mg/dL    Nitrite, Urine NEGATIVE NEGATIVE    Leukocyte Esterase, Urine  75 Evelia/µL (A) NEGATIVE   Extra Urine Gray Tube   Result Value Ref Range    Extra Tube Hold for add-ons.    Microscopic Only, Urine   Result Value Ref Range    WBC, Urine 6-10 (A) 1-5, NONE /HPF    RBC, Urine 1-2 NONE, 1-2, 3-5 /HPF    Squamous Epithelial Cells, Urine 1-9 (SPARSE) Reference range not established. /HPF   Urine electrolytes   Result Value Ref Range    Sodium, Urine Random 38 mmol/L    Sodium/Creatinine Ratio 189 Not established. mmol/g Creat    Potassium, Urine Random 11 mmol/L    Potassium/Creatinine Ratio 55 Not established mmol/g Creat    Chloride, Urine Random 36 mmol/L    Chloride/Creatinine Ratio 179 38 - 318 mmol/g creat    Creatinine, Urine Random 20.1 20.0 - 320.0 mg/dL   Osmolality, urine   Result Value Ref Range    Osmolality, Urine Random 162 (L) 200 - 1,200 mOsm/kg   CBC   Result Value Ref Range    WBC 12.1 (H) 4.4 - 11.3 x10*3/uL    nRBC 0.0 0.0 - 0.0 /100 WBCs    RBC 3.88 (L) 4.00 - 5.20 x10*6/uL    Hemoglobin 11.7 (L) 12.0 - 16.0 g/dL    Hematocrit 36.1 36.0 - 46.0 %    MCV 93 80 - 100 fL    MCH 30.2 26.0 - 34.0 pg    MCHC 32.4 32.0 - 36.0 g/dL    RDW 11.9 11.5 - 14.5 %    Platelets 344 150 - 450 x10*3/uL   Renal Function Panel   Result Value Ref Range    Glucose 124 (H) 74 - 99 mg/dL    Sodium 136 136 - 145 mmol/L    Potassium 4.9 3.5 - 5.3 mmol/L    Chloride 104 98 - 107 mmol/L    Bicarbonate 24 21 - 32 mmol/L    Anion Gap 13 10 - 20 mmol/L    Urea Nitrogen 32 (H) 6 - 23 mg/dL    Creatinine 1.88 (H) 0.50 - 1.05 mg/dL    eGFR 28 (L) >60 mL/min/1.73m*2    Calcium 8.8 8.6 - 10.3 mg/dL    Phosphorus 3.6 2.5 - 4.9 mg/dL    Albumin 3.4 3.4 - 5.0 g/dL   Magnesium   Result Value Ref Range    Magnesium 2.31 1.60 - 2.40 mg/dL         Assessment/Plan   Verenice Sotelo is a 69 y.o. female admitted for the management of lower abdominal discomfort and vaginal pain s/p multiple urologic procedures 9/16.      Acute Medical Issues   #Bladder wall rupture  - S/p cystoscopy and bladder wall repair  with Dr. Johnson on 9/27  - Dr. Johnson recommending to keep Teresa in at discharge and to follow up with Dr. Silva as an outpatient to remove the Teresa  - Decreasing pain regimen back to her outpatient pain regimen     #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  - Creatinine downtrending, anticipate continued improvement daily  - Continue to trend daily RFPs    #Constipation  - Bowel regimen in place    #Hypotension  - Will hold amlodipine 5 mg today  - Discontinuing hydrochlorothiazide per patient request  - Monitor BP during the day     Chronic Medical Issues    #Hypothyroidism  -Continue home levothyroxine      #Hyperlipidemia  -Continue home atorvastatin      F: IVF PRN   E: Replete PRN  N: Regular  GI ppx: None  DVT ppx: heparin and scd  Antibiotics: None  Tubes/Lines/Drains: PIVs     Code Status: Full Code   Emergency Contact: Extended Emergency Contact Information  Primary Emergency Contact: Eder Sotelo  [place] Phone: [phone]  Mobile Phone: [phone]  Relation: Spouse      Disposition: Likely discharge tomorrow pending continued improvement of kidney function, pain control.    Yony Salcido MD

## 2024-09-29 ENCOUNTER — APPOINTMENT (OUTPATIENT)
Dept: RADIOLOGY | Facility: HOSPITAL | Age: 69
DRG: 699 | End: 2024-09-29
Payer: COMMERCIAL

## 2024-09-29 VITALS
DIASTOLIC BLOOD PRESSURE: 77 MMHG | RESPIRATION RATE: 17 BRPM | HEIGHT: 67 IN | SYSTOLIC BLOOD PRESSURE: 129 MMHG | OXYGEN SATURATION: 96 % | TEMPERATURE: 98.6 F | BODY MASS INDEX: 25.11 KG/M2 | WEIGHT: 160 LBS | HEART RATE: 69 BPM

## 2024-09-29 LAB
ALBUMIN SERPL BCP-MCNC: 3.1 G/DL (ref 3.4–5)
ANION GAP SERPL CALC-SCNC: 10 MMOL/L (ref 10–20)
BACTERIA UR CULT: NO GROWTH
BUN SERPL-MCNC: 20 MG/DL (ref 6–23)
CALCIUM SERPL-MCNC: 8.5 MG/DL (ref 8.6–10.3)
CHLORIDE SERPL-SCNC: 107 MMOL/L (ref 98–107)
CO2 SERPL-SCNC: 27 MMOL/L (ref 21–32)
CREAT SERPL-MCNC: 0.86 MG/DL (ref 0.5–1.05)
EGFRCR SERPLBLD CKD-EPI 2021: 73 ML/MIN/1.73M*2
ERYTHROCYTE [DISTWIDTH] IN BLOOD BY AUTOMATED COUNT: 12.4 % (ref 11.5–14.5)
GLUCOSE SERPL-MCNC: 95 MG/DL (ref 74–99)
HCT VFR BLD AUTO: 33.8 % (ref 36–46)
HGB BLD-MCNC: 11 G/DL (ref 12–16)
MAGNESIUM SERPL-MCNC: 2.21 MG/DL (ref 1.6–2.4)
MCH RBC QN AUTO: 30.3 PG (ref 26–34)
MCHC RBC AUTO-ENTMCNC: 32.5 G/DL (ref 32–36)
MCV RBC AUTO: 93 FL (ref 80–100)
NRBC BLD-RTO: 0 /100 WBCS (ref 0–0)
PHOSPHATE SERPL-MCNC: 2.7 MG/DL (ref 2.5–4.9)
PLATELET # BLD AUTO: 286 X10*3/UL (ref 150–450)
POTASSIUM SERPL-SCNC: 3.8 MMOL/L (ref 3.5–5.3)
RBC # BLD AUTO: 3.63 X10*6/UL (ref 4–5.2)
SODIUM SERPL-SCNC: 140 MMOL/L (ref 136–145)
WBC # BLD AUTO: 10.2 X10*3/UL (ref 4.4–11.3)

## 2024-09-29 PROCEDURE — 36415 COLL VENOUS BLD VENIPUNCTURE: CPT | Performed by: STUDENT IN AN ORGANIZED HEALTH CARE EDUCATION/TRAINING PROGRAM

## 2024-09-29 PROCEDURE — 85027 COMPLETE CBC AUTOMATED: CPT | Performed by: STUDENT IN AN ORGANIZED HEALTH CARE EDUCATION/TRAINING PROGRAM

## 2024-09-29 PROCEDURE — 2500000004 HC RX 250 GENERAL PHARMACY W/ HCPCS (ALT 636 FOR OP/ED): Performed by: STUDENT IN AN ORGANIZED HEALTH CARE EDUCATION/TRAINING PROGRAM

## 2024-09-29 PROCEDURE — 80069 RENAL FUNCTION PANEL: CPT | Performed by: STUDENT IN AN ORGANIZED HEALTH CARE EDUCATION/TRAINING PROGRAM

## 2024-09-29 PROCEDURE — 2500000001 HC RX 250 WO HCPCS SELF ADMINISTERED DRUGS (ALT 637 FOR MEDICARE OP)

## 2024-09-29 PROCEDURE — 2500000004 HC RX 250 GENERAL PHARMACY W/ HCPCS (ALT 636 FOR OP/ED)

## 2024-09-29 PROCEDURE — 83735 ASSAY OF MAGNESIUM: CPT | Performed by: STUDENT IN AN ORGANIZED HEALTH CARE EDUCATION/TRAINING PROGRAM

## 2024-09-29 PROCEDURE — 99024 POSTOP FOLLOW-UP VISIT: CPT | Performed by: STUDENT IN AN ORGANIZED HEALTH CARE EDUCATION/TRAINING PROGRAM

## 2024-09-29 PROCEDURE — 2500000005 HC RX 250 GENERAL PHARMACY W/O HCPCS

## 2024-09-29 PROCEDURE — 2500000004 HC RX 250 GENERAL PHARMACY W/ HCPCS (ALT 636 FOR OP/ED): Mod: JZ

## 2024-09-29 PROCEDURE — 74176 CT ABD & PELVIS W/O CONTRAST: CPT

## 2024-09-29 PROCEDURE — 99232 SBSQ HOSP IP/OBS MODERATE 35: CPT

## 2024-09-29 PROCEDURE — 2500000001 HC RX 250 WO HCPCS SELF ADMINISTERED DRUGS (ALT 637 FOR MEDICARE OP): Performed by: STUDENT IN AN ORGANIZED HEALTH CARE EDUCATION/TRAINING PROGRAM

## 2024-09-29 RX ORDER — LEVOTHYROXINE SODIUM 100 UG/1
100 TABLET ORAL DAILY
Status: CANCELLED | OUTPATIENT
Start: 2024-09-30

## 2024-09-29 RX ORDER — CLINDAMYCIN PHOSPHATE 900 MG/50ML
900 INJECTION, SOLUTION INTRAVENOUS EVERY 8 HOURS
Status: CANCELLED | OUTPATIENT
Start: 2024-09-29

## 2024-09-29 RX ORDER — HEPARIN SODIUM 5000 [USP'U]/ML
5000 INJECTION, SOLUTION INTRAVENOUS; SUBCUTANEOUS EVERY 8 HOURS SCHEDULED
Status: CANCELLED | OUTPATIENT
Start: 2024-09-29

## 2024-09-29 RX ORDER — ADHESIVE BANDAGE
30 BANDAGE TOPICAL DAILY PRN
Status: CANCELLED | OUTPATIENT
Start: 2024-09-29

## 2024-09-29 RX ORDER — POLYETHYLENE GLYCOL 3350 17 G/17G
17 POWDER, FOR SOLUTION ORAL DAILY
Status: CANCELLED | OUTPATIENT
Start: 2024-09-30

## 2024-09-29 RX ORDER — ADHESIVE BANDAGE
30 BANDAGE TOPICAL DAILY PRN
Status: DISCONTINUED | OUTPATIENT
Start: 2024-09-29 | End: 2024-09-30 | Stop reason: HOSPADM

## 2024-09-29 RX ORDER — GLYCERIN 1 G/1
1 SUPPOSITORY RECTAL ONCE
Status: DISCONTINUED | OUTPATIENT
Start: 2024-09-29 | End: 2024-09-30 | Stop reason: HOSPADM

## 2024-09-29 RX ORDER — CLINDAMYCIN PHOSPHATE 900 MG/50ML
900 INJECTION, SOLUTION INTRAVENOUS EVERY 8 HOURS
Status: DISCONTINUED | OUTPATIENT
Start: 2024-09-29 | End: 2024-09-30 | Stop reason: HOSPADM

## 2024-09-29 RX ORDER — AMOXICILLIN 250 MG
2 CAPSULE ORAL 2 TIMES DAILY
Status: CANCELLED | OUTPATIENT
Start: 2024-09-29

## 2024-09-29 RX ORDER — ASPIRIN 81 MG/1
81 TABLET ORAL NIGHTLY
Status: CANCELLED | OUTPATIENT
Start: 2024-09-29

## 2024-09-29 RX ORDER — AMOXICILLIN 250 MG
2 CAPSULE ORAL 2 TIMES DAILY
Status: DISCONTINUED | OUTPATIENT
Start: 2024-09-29 | End: 2024-09-30 | Stop reason: HOSPADM

## 2024-09-29 RX ORDER — ESTRADIOL 0.1 MG/G
2 CREAM VAGINAL DAILY
Status: CANCELLED | OUTPATIENT
Start: 2024-09-30

## 2024-09-29 RX ORDER — TRAMADOL HYDROCHLORIDE 50 MG/1
50 TABLET ORAL EVERY 6 HOURS PRN
Status: DISCONTINUED | OUTPATIENT
Start: 2024-09-29 | End: 2024-09-30 | Stop reason: HOSPADM

## 2024-09-29 RX ORDER — TRAMADOL HYDROCHLORIDE 50 MG/1
50 TABLET ORAL EVERY 6 HOURS PRN
Status: CANCELLED | OUTPATIENT
Start: 2024-09-29

## 2024-09-29 RX ORDER — OXYCODONE HYDROCHLORIDE 5 MG/1
5 TABLET ORAL EVERY 6 HOURS PRN
Status: DISCONTINUED | OUTPATIENT
Start: 2024-09-29 | End: 2024-09-30 | Stop reason: HOSPADM

## 2024-09-29 RX ORDER — AMLODIPINE BESYLATE 5 MG/1
5 TABLET ORAL DAILY
Status: CANCELLED | OUTPATIENT
Start: 2024-09-30

## 2024-09-29 RX ORDER — ATORVASTATIN CALCIUM 40 MG/1
40 TABLET, FILM COATED ORAL DAILY
Status: CANCELLED | OUTPATIENT
Start: 2024-09-30

## 2024-09-29 RX ORDER — ACETAMINOPHEN 325 MG/1
1000 TABLET ORAL EVERY 8 HOURS
Status: DISCONTINUED | OUTPATIENT
Start: 2024-09-29 | End: 2024-09-30 | Stop reason: HOSPADM

## 2024-09-29 RX ORDER — LIDOCAINE 560 MG/1
1 PATCH PERCUTANEOUS; TOPICAL; TRANSDERMAL DAILY
Status: DISCONTINUED | OUTPATIENT
Start: 2024-09-29 | End: 2024-09-30 | Stop reason: HOSPADM

## 2024-09-29 RX ORDER — OXYCODONE HYDROCHLORIDE 5 MG/1
5 TABLET ORAL EVERY 6 HOURS PRN
Status: CANCELLED | OUTPATIENT
Start: 2024-09-29

## 2024-09-29 RX ORDER — ACETAMINOPHEN 325 MG/1
1000 TABLET ORAL EVERY 8 HOURS
Status: CANCELLED | OUTPATIENT
Start: 2024-09-29

## 2024-09-29 RX ORDER — LIDOCAINE 560 MG/1
1 PATCH PERCUTANEOUS; TOPICAL; TRANSDERMAL DAILY
Status: CANCELLED | OUTPATIENT
Start: 2024-09-30

## 2024-09-29 ASSESSMENT — PAIN SCALES - GENERAL
PAINLEVEL_OUTOF10: 3
PAINLEVEL_OUTOF10: 8
PAINLEVEL_OUTOF10: 3
PAINLEVEL_OUTOF10: 5 - MODERATE PAIN
PAINLEVEL_OUTOF10: 4
PAINLEVEL_OUTOF10: 8

## 2024-09-29 ASSESSMENT — COGNITIVE AND FUNCTIONAL STATUS - GENERAL
MOBILITY SCORE: 17
MOVING TO AND FROM BED TO CHAIR: A LITTLE
DRESSING REGULAR LOWER BODY CLOTHING: A LOT
WALKING IN HOSPITAL ROOM: A LOT
STANDING UP FROM CHAIR USING ARMS: A LITTLE
PERSONAL GROOMING: A LITTLE
CLIMB 3 TO 5 STEPS WITH RAILING: A LOT
TOILETING: A LITTLE
HELP NEEDED FOR BATHING: A LITTLE
DAILY ACTIVITIY SCORE: 18
DRESSING REGULAR UPPER BODY CLOTHING: A LITTLE
TURNING FROM BACK TO SIDE WHILE IN FLAT BAD: A LITTLE

## 2024-09-29 ASSESSMENT — PAIN - FUNCTIONAL ASSESSMENT
PAIN_FUNCTIONAL_ASSESSMENT: 0-10

## 2024-09-29 ASSESSMENT — PAIN DESCRIPTION - DESCRIPTORS: DESCRIPTORS: BURNING;ACHING;SHARP

## 2024-09-29 ASSESSMENT — PAIN DESCRIPTION - LOCATION
LOCATION: GROIN
LOCATION: GROIN

## 2024-09-29 NOTE — CARE PLAN
The patient's goals for the shift include  pain control, have a BM    The clinical goals for the shift include Teresa education, manage pain, have BM

## 2024-09-29 NOTE — PROGRESS NOTES
"Verenice Sotelo is a 69 y.o. female on day 2 of admission presenting with JOSE F (acute kidney injury) (CMS-[facility]).    Subjective   Having more pain today, tramadol        Objective     Physical Exam    Suprapubic regions is persistently swollen as is the left labia, relatively unchanged from prior      Last Recorded Vitals  Blood pressure 129/72, pulse 56, temperature 36.3 °C (97.3 °F), temperature source Temporal, resp. rate 18, height 1.702 m (5' 7\"), weight 72.6 kg (160 lb), SpO2 94%.  Intake/Output last 3 Shifts:  I/O last 3 completed shifts:  In: 1138.3 (15.7 mL/kg) [P.O.:1020; I.V.:118.3 (1.6 mL/kg)]  Out: 4225 (58.2 mL/kg) [Urine:4225 (1.6 mL/kg/hr)]  Weight: 72.6 kg     Relevant Results  Scheduled medications  acetaminophen, 975 mg, oral, q8h  [Held by provider] amLODIPine, 5 mg, oral, Daily  aspirin, 81 mg, oral, Nightly  atorvastatin, 40 mg, oral, Daily  estradiol, 2 g, vaginal, Daily  glycerin, 1 suppository, rectal, Once  heparin (porcine), 5,000 Units, subcutaneous, q8h ADRY  levothyroxine, 100 mcg, oral, Daily  lidocaine, 1 patch, transdermal, Daily  polyethylene glycol, 17 g, oral, Daily  sennosides-docusate sodium, 2 tablet, oral, BID      Continuous medications     PRN medications  PRN medications: HYDROmorphone, magnesium hydroxide, oxyCODONE, traMADol    Results for orders placed or performed during the hospital encounter of 09/27/24 (from the past 24 hour(s))   CBC   Result Value Ref Range    WBC 10.2 4.4 - 11.3 x10*3/uL    nRBC 0.0 0.0 - 0.0 /100 WBCs    RBC 3.63 (L) 4.00 - 5.20 x10*6/uL    Hemoglobin 11.0 (L) 12.0 - 16.0 g/dL    Hematocrit 33.8 (L) 36.0 - 46.0 %    MCV 93 80 - 100 fL    MCH 30.3 26.0 - 34.0 pg    MCHC 32.5 32.0 - 36.0 g/dL    RDW 12.4 11.5 - 14.5 %    Platelets 286 150 - 450 x10*3/uL   Renal Function Panel   Result Value Ref Range    Glucose 95 74 - 99 mg/dL    Sodium 140 136 - 145 mmol/L    Potassium 3.8 3.5 - 5.3 mmol/L    Chloride 107 98 - 107 mmol/L    Bicarbonate 27 21 - 32 " mmol/L    Anion Gap 10 10 - 20 mmol/L    Urea Nitrogen 20 6 - 23 mg/dL    Creatinine 0.86 0.50 - 1.05 mg/dL    eGFR 73 >60 mL/min/1.73m*2    Calcium 8.5 (L) 8.6 - 10.3 mg/dL    Phosphorus 2.7 2.5 - 4.9 mg/dL    Albumin 3.1 (L) 3.4 - 5.0 g/dL   Magnesium   Result Value Ref Range    Magnesium 2.21 1.60 - 2.40 mg/dL       CT cystogram    Result Date: 9/27/2024  Interpreted By:  Rosy Nash, STUDY: CT CYSTOGRAM;  9/27/2024 4:15 pm   INDICATION: Signs/Symptoms:Increased suprapubic pain s/p sling placement and laparoscopic supracervical hysterectomy on 09/16/2024     COMPARISON: None.   ACCESSION NUMBER(S): [id number]   ORDERING CLINICIAN: RENEA VENTURA   TECHNIQUE: CT examination of the pelvis is performed with sagittal and coronal reformations completed. Initially, images were obtained with no contrast administered. Subsequently, 50 mL of dilute contrast was instilled through the Teresa catheter into the urinary bladder for the study.   FINDINGS: There is a Teresa catheter satisfactorily positioned within the urinary bladder. With instillation of contrast through the Teresa catheter, there is extraperitoneal contrast extravasation with the site of bladder rupture identified along the left anterolateral wall of the urinary bladder. There is communication of the extraperitoneal extravasation of the urine with the left inguinal canal with a small tract of contrast extending into the left inguinal region. There is also a small tract of contrast extending into a subcutaneous fluid collection located just external to the rectus sheath inferiorly.   In addition, there is intraperitoneal fluid within the pelvis and along the pericolic gutters, right more so than left. However, the intraperitoneal fluid does not exhibit contrast opacification after filling of the urinary bladder with the dilute contrast. There is also fluid accumulated within the subcutaneous tissues of each flank with induration and edema of the mons pubis and with  fluid accumulated along the left side of the mons pubis.   The uterus has been resected.       1.  There is an extraperitoneal bladder rupture identified along the left anterolateral wall of the urinary bladder. The extraperitoneal rupture also exhibits 2 tracts extending into the left inguinal region as well as extending into a deep subcutaneous fluid collection seen just external to the rectus sheath inferiorly. There is edema of the subcutaneous tissues along the mons pubis particularly along the left side of the mons pubis. 2. There is also free intraperitoneal fluid within the pelvis and along the pericolic gutters without opacification following contrast instillation into the urinary bladder. The free intraperitoneal fluid is most likely normal postoperative finding.   MACRO: None   Signed by: Rosy Nash 9/27/2024 4:58 PM Dictation workstation:   [id number]    CT abdomen pelvis wo IV contrast    Result Date: 9/27/2024  Interpreted By:  Reed Thomas, STUDY: CT ABDOMEN PELVIS WO IV CONTRAST; 9/27/2024 12:37 pm   INDICATION: Signs/Symptoms:lower abd pain and swelling with recent surgery   COMPARISON: None   ACCESSION NUMBER(S): [id number]   ORDERING CLINICIAN: KAREN BANDA   TECHNIQUE: Spiral axial unenhanced images were obtained from the upper abdomen to the symphysis pubis. Oral contrast was not administered.   All CT examinations are performed with one or more of the following dose reduction techniques: Automated Exposure Control, adjustment of mA and/or kV according to patient size, or use of iterative reconstruction techniques.   FINDINGS: Lung bases: The lung bases are clear. Liver: Normal in size without focal lesions. Gallbladder: Grossly unremarkable. Spleen: Normal in size without focal lesions. Pancreas: Unremarkable. Adrenal glands: No focal lesions. The kidneys are normal in size and position. There are no renal calculi or hydronephrosis. No abnormal calcifications are seen within the course  of the ureters or within the bladder. Pelvic reproductive organs: Unremarkable.   Small hiatal hernia is noted. Several colonic diverticula are present, without acute diverticulitis. There is no bowel obstruction. The appendix is seen and appears normal. There is free fluid in the abdomen and pelvis, however there is no pneumoperitoneum or abscess. There is also fat stranding of the subcutaneous soft tissues of the anterior pelvic wall, extending inferiorly to the perineum. There is also elongated fluid collection anteriorly to the left anterior pelvic wall measuring up to 2 cm in thickness, most likely a postsurgical seroma. Mild atherosclerotic calcifications involve the aorta, without focal aneurysm.       1. Ascites in the abdomen and pelvis without pneumoperitoneum or intraperitoneal abscess; correlate clinically. 2. Fat stranding involving the lower abdominal/pelvic subcutaneous tissues extending inferiorly to the level of the perineum, with an elongated fluid collection anteriorly to the left anterior pelvic musculature, most likely a postsurgical seroma. Correlate clinically and follow-up as needed. 3. Small hiatal hernia. Colonic diverticulosis without acute diverticulitis.   Signed by: Reed Thomas 9/27/2024 1:05 PM Dictation workstation:   [id number]                             Assessment/Plan   Assessment & Plan  JOSE F (acute kidney injury) (CMS-[facility])    Bladder injury    Worsening pain, recommend switching back to oxycodone q4-6 hours  Alternating heat and ice packs for labia  Repeat CT of pelvis to evaluate worsening pain   Planning on f/up with Dr. Silva this week         Ruben Johnson MD

## 2024-09-29 NOTE — PROGRESS NOTES
Internal Medicine - Daily Progress Note   Hospital Day: 3       Name:Verenice Sotelo, AGE: 69 y.o., GENDER: female, MRN: [id number], ROOM: 110/110-A   CODE STATUS: Full Code  Attending Physician: Nagi Mars MD  Resident: Ericka Garg MD        Chief Complaint     Chief Complaint   Patient presents with    Abdominal Pain        Subjective    Verenice Sotelo is a 69 y.o. year old female patient on Hospital Day: 3    Overnight events:  - No acute events overnight.-Seen and examined at bedside this morning, patient continued to endorse pain.  Had questions regarding her pain regimen.  Did endorse discomfort and pain in her lower abdomen.  Patient also has not had a bowel movement since Thursday.  Denies any headaches, chest pain, shortness of breath otherwise.    Meds    Scheduled medications  [Held by provider] amLODIPine, 5 mg, oral, Daily  aspirin, 81 mg, oral, Nightly  atorvastatin, 40 mg, oral, Daily  estradiol, 2 g, vaginal, Daily  glycerin, 1 suppository, rectal, Once  heparin (porcine), 5,000 Units, subcutaneous, q8h ADRY  levothyroxine, 100 mcg, oral, Daily  polyethylene glycol, 17 g, oral, Daily  sennosides-docusate sodium, 1 tablet, oral, BID      Continuous medications     PRN medications  PRN medications: acetaminophen, HYDROmorphone, magnesium hydroxide, oxyCODONE, traMADol     Objective    Physical Exam  Cardiovascular:      Rate and Rhythm: Normal rate and regular rhythm.      Heart sounds: No murmur heard.     No friction rub. No gallop.   Pulmonary:      Breath sounds: Normal breath sounds. No stridor. No wheezing or rhonchi.   Abdominal:      General: Abdomen is flat. Bowel sounds are normal.      Palpations: Abdomen is soft. There is no mass.      Tenderness: There is no abdominal tenderness. There is no guarding.   Musculoskeletal:         General: No swelling. Normal range of motion.   Neurological:      Mental Status: She is alert and oriented to person, place, and time.   Psychiatric:     "  Comments: In pain        Visit Vitals  /72 (BP Location: Right arm, Patient Position: Lying)   Pulse 56   Temp 36.3 °C (97.3 °F) (Temporal)   Resp 18   Ht 1.702 m (5' 7\")   Wt 72.6 kg (160 lb)   SpO2 94%   BMI 25.06 kg/m²   OB Status Postmenopausal   Smoking Status Never   BSA 1.85 m²        Intake/Output Summary (Last 24 hours) at 9/29/2024 1054  Last data filed at 9/29/2024 0940  Gross per 24 hour   Intake 1380 ml   Output 3800 ml   Net -2420 ml     Labs:   Results from last 72 hours   Lab Units 09/29/24  0600 09/28/24  0620 09/27/24  1147   SODIUM mmol/L 140 136 136   POTASSIUM mmol/L 3.8 4.9 4.4   CHLORIDE mmol/L 107 104 103   CO2 mmol/L 27 24 25   BUN mg/dL 20 32* 42*   CREATININE mg/dL 0.86 1.88* 2.75*   GLUCOSE mg/dL 95 124* 88   CALCIUM mg/dL 8.5* 8.8 9.0   ANION GAP mmol/L 10 13 12   EGFR mL/min/1.73m*2 73 28* 18*   PHOSPHORUS mg/dL 2.7 3.6  --       Results from last 72 hours   Lab Units 09/29/24  0600 09/28/24  0620 09/27/24  1147   WBC AUTO x10*3/uL 10.2 12.1* 8.5   HEMOGLOBIN g/dL 11.0* 11.7* 12.3   HEMATOCRIT % 33.8* 36.1 36.9   PLATELETS AUTO x10*3/uL 286 344 321   NEUTROS PCT AUTO %  --   --  70.6   LYMPHS PCT AUTO %  --   --  21.0   MONOS PCT AUTO %  --   --  6.1   EOS PCT AUTO %  --   --  1.4      Lab Results   Component Value Date    CALCIUM 8.5 (L) 09/29/2024    PHOS 2.7 09/29/2024     Micro/ID:     Lab Results   Component Value Date    URINECULTURE No growth 09/27/2024       Images    CT cystogram  Result Date: 9/27/2024  1.  There is an extraperitoneal bladder rupture identified along the left anterolateral wall of the urinary bladder. The extraperitoneal rupture also exhibits 2 tracts extending into the left inguinal region as well as extending into a deep subcutaneous fluid collection seen just external to the rectus sheath inferiorly. There is edema of the subcutaneous tissues along the mons pubis particularly along the left side of the mons pubis. 2. There is also free " intraperitoneal fluid within the pelvis and along the pericolic gutters without opacification following contrast instillation into the urinary bladder. The free intraperitoneal fluid is most likely normal postoperative finding.   MACRO: None   Signed by: Rosy Nash 9/27/2024 4:58 PM Dictation workstation:   [id number]    CT abdomen pelvis wo IV contrast  Result Date: 9/27/2024  1. Ascites in the abdomen and pelvis without pneumoperitoneum or intraperitoneal abscess; correlate clinically. 2. Fat stranding involving the lower abdominal/pelvic subcutaneous tissues extending inferiorly to the level of the perineum, with an elongated fluid collection anteriorly to the left anterior pelvic musculature, most likely a postsurgical seroma. Correlate clinically and follow-up as needed. 3. Small hiatal hernia. Colonic diverticulosis without acute diverticulitis.   Signed by: Reed Thomas 9/27/2024 1:05 PM Dictation workstation:   [id number]      Assessment and Plan    Verenice Sotelo is a 69 y.o. female admitted on 9/27/2024 for the management of lower abdominal discomfort and vaginal pain s/p multiple urologic procedures 9/16 .  Currently being managed for bladder wall rupture status post repair on 09/27.  Urogyn on board    ACUTE MEDICAL ISSUES:  # Bladder wall rupture  - S/p cystoscopy and bladder wall repair with Dr. Johnson on 9/27  - Dr. Johnson recommending to keep Teresa in at discharge and to follow up with Dr. Silva as an outpatient to remove the Teresa  - On Estrace 2 g vaginal cream every day  - Pain regimen: On scheduled Tylenol 975 mg every 8 hours with lidocaine 4% patch, tramadol 50 mg every 6 hours as needed for moderate pain, oxycodone 5 mg every 6 hours as needed for severe pain, Dilaudid 0.5 mg every 4 hours as needed for breakthrough pain    #Constipation  - Bowel regimen in place: On scheduled MiraLAX 17 g every day, Rachel-Colace 8.6-50 mg 2 tablet twice a day with as needed milk of mag 30 mL every  day  - Will order one-time dose of glycerin suppository 2g    ADDRESSED/ RESOLVED MEDICAL ISSUES:  # Hypotension - Resolving  - Continue to hold amlodipine 5 mg --> will hold on discharge to follow-up with the PCP in regards to restarting it  - Patient has not been taking her hydrochlorothiazide --> will not order while patient is inpatient     # 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  - Creatinine downtrending, now back to baseline  - Continue to trend daily RFPs    CHRONIC MEDICAL ISSUES:  # Hypothyroidism  - Continue levothyroxine 100 mcg every day  # CAD - Continue home aspirin 81 mg every night  # HLD - Continue atorvastatin 40 mg every day    Fluids: Bolus as needed  Electrolytes: Replete as needed  Nutrition: Adult regular diet  Antimicrobials: None  DVT ppx: Heparin 5000 units every 8 hours  GI ppx: None  Catheter: None X urethral catheter  Lines: 22-gauge PIV in left dorsal hand  Supplemental Oxygen: On room air  Emergency Contact: Extended Emergency Contact Information  Primary Emergency Contact: Eder Sotelo  Home Phone: [phone]  Mobile Phone: [phone]  Relation: Spouse   Code: Full Code     Disposition: Likely be discharged tomorrow, pending appropriate pain control    Ericka Garg MD  Internal Medicine, PGY- 2  09/29/24 at 10:54 AM

## 2024-09-29 NOTE — DISCHARGE SUMMARY
Discharge Diagnosis  # Bladder wall rupture     Issues Requiring Follow-Up  - [place] [facility] [place] Meds     Medication List      ASK your doctor about these medications     acetaminophen 500 mg tablet; Commonly known as: Tylenol; Take 2 tablets   (1,000 mg) by mouth every 6 hours if needed for mild pain (1 - 3) for up   to 5 days.; Ask about: Should I take this medication?   amLODIPine 5 mg tablet; Commonly known as: Norvasc; Take 1 tablet (5 mg)   by mouth once daily.   aspirin 81 mg EC tablet   atorvastatin 40 mg tablet; Commonly known as: Lipitor; Take 1 tablet (40   mg) by mouth once daily.   calcium carbonate-vitamin D3 600 mg-20 mcg (800 unit) tablet   docusate sodium 100 mg capsule; Commonly known as: Colace; Take 1   capsule (100 mg) by mouth 2 times a day for 5 days.; Ask about: Should I   take this medication?   doxycycline 100 mg capsule; Commonly known as: Vibramycin; Take 1   capsule (100 mg) by mouth 2 times a day for 7 days.; Ask about: Should I   take this medication?   estradiol 0.01 % (0.1 mg/gram) vaginal cream; Commonly known as:   Estrace; Insert 0.5 Applicatorfuls (2 g) into the vagina once daily.   hydroCHLOROthiazide 12.5 mg tablet; Commonly known as: Microzide; Take 1   tablet (12.5 mg) by mouth once daily.   ketorolac 10 mg tablet; Commonly known as: Toradol; Take 1 tablet (10   mg) by mouth every 6 hours if needed for moderate pain (4 - 6) for up to 5   days.; Ask about: Should I take this medication?   levothyroxine 100 mcg tablet; Commonly known as: Synthroid, Levoxyl;   Take 1 tablet (100 mcg) by mouth once daily.   magnesium hydroxide 400 mg/5 mL suspension; Commonly known as: Milk of   Magnesia; Take 15 mL by mouth once daily as needed for constipation (if   have not had a BM by postoperative day 3).   meloxicam 7.5 mg tablet; Commonly known as: Mobic; Take 1 tablet (7.5   mg) by mouth once daily.   multivitamin with minerals tablet    mupirocin 2 % ointment; Commonly known as: Bactroban; Apply topically 3   times a day for 10 days. apply to affected area; Ask about: Should I take   this medication?   polyethylene glycol 17 gram/dose powder; Commonly known as: Glycolax,   Miralax; DISSOLVE 17 GRAMS IN 8 OZ OF FLUID LIQUID DRINK DAILY AS DIRECTED   tamsulosin 0.4 mg 24 hr capsule; Commonly known as: Flomax; Take 3 days   before surgery and 7 days after   traMADol 50 mg tablet; Commonly known as: Ultram; Take 1 tablet (50 mg)   by mouth every 6 hours if needed for severe pain (7 - 10) for up to 5   days.; Ask about: Should I take this medication?       Test Results Pending At Discharge  Pending Labs       No current pending labs.            Hospital Course  Verenice Sotelo is a 69 year old female with a PMH of HTN, hyperlipidemia, hypothyroidism, Raynauds, genital prolapse s/p bladder sling placement who presented to the hospital with lower abdominal pain and vulvar pain/swelling. Patient had a sling placement, hysterectomy, and laparoscopic colpopexy procedures done by Dr. Johnson for genital prolapse and urinary incontinence on 9/16, and since the 19th, her pain and overall discomfort continues to worsen. CT cystogram obtained showed bladder wall rupture, Dr. Johnson immediately came to repair the bladder. Teresa catheter was placed Patient was also found to have elevated creatinine 2/2 the urinoma, and continued to improve with fluids. Patient's BP regimen was modified after discussion with her to discontinue the hydrochlorothiazide and her amlodipine was held.  On the morning of 09/29 patient continued to have worsening abdominal pain.  CTAP done at that time showed subcutaneous gas on the left side of mons pubis and tracking with new gas bubbles.  This could have been likely secondary to recent instrumentation.  After discussion with Dr. Johnson, patient was started on clindamycin.  Given that patient required additional surgical intervention,  transfer to [facility] was initiated.  Patient was transferred on 09/29/2024    Will continue to maintain Teresa  and will follow up outpatient with urology to have Teresa removed.    Pertinent Physical Exam At Time of Discharge  Physical Exam  Constitutional:       Appearance: Normal appearance.   HENT:      Head: Normocephalic and atraumatic.      Mouth/Throat:      Mouth: Mucous membranes are moist.   Cardiovascular:      Rate and Rhythm: Normal rate and regular rhythm.      Heart sounds: No murmur heard.     No friction rub. No gallop.   Pulmonary:      Breath sounds: Normal breath sounds. No stridor. No wheezing or rhonchi.   Abdominal:      General: Abdomen is flat.      Tenderness: There is abdominal tenderness.   Musculoskeletal:         General: No swelling or tenderness. Normal range of motion.      Cervical back: Normal range of motion.   Neurological:      General: No focal deficit present.      Mental Status: She is alert and oriented to person, place, and time.   Psychiatric:         Mood and Affect: Mood normal.         Behavior: Behavior normal.           Outpatient Follow-Up  Future Appointments   Date Time Provider Department Center   10/3/2024 11:40 AM KACY [id number] [facility]   10/21/2024 10:00 AM DO FREDDIE [id number] [facility]   10/31/2024  9:00 AM MD Amari [id number]         Ericka Garg MD

## 2024-09-29 NOTE — SIGNIFICANT EVENT
CT shows air tracking along into left labia, however, this was discussed with the radiologist, Dr. Braun, and felt to be related to recent instrumentation, at the moment patient reports feeling better than this morning, and there is appreciable decrease in pain and swelling of the mons and left labia     Though infection is of low likelihood, we will presumptively start antibiotics, and transfer to [facility], if patient does not improve appreciable in next 24-48 hours and continues to have discomfort, we will plan on mesh excision at [facility]

## 2024-09-30 ENCOUNTER — HOSPITAL ENCOUNTER (OUTPATIENT)
Facility: HOSPITAL | Age: 69
Discharge: HOME | End: 2024-10-01
Attending: STUDENT IN AN ORGANIZED HEALTH CARE EDUCATION/TRAINING PROGRAM | Admitting: STUDENT IN AN ORGANIZED HEALTH CARE EDUCATION/TRAINING PROGRAM
Payer: COMMERCIAL

## 2024-09-30 DIAGNOSIS — N32.81 OAB (OVERACTIVE BLADDER): ICD-10-CM

## 2024-09-30 DIAGNOSIS — I10 BENIGN ESSENTIAL HYPERTENSION: ICD-10-CM

## 2024-09-30 DIAGNOSIS — I73.00 RAYNAUD'S DISEASE WITHOUT GANGRENE: ICD-10-CM

## 2024-09-30 DIAGNOSIS — M17.11 ARTHRITIS OF KNEE, RIGHT: ICD-10-CM

## 2024-09-30 DIAGNOSIS — E78.2 MIXED HYPERLIPIDEMIA: ICD-10-CM

## 2024-09-30 DIAGNOSIS — E78.01 FAMILIAL HYPERCHOLESTEREMIA: ICD-10-CM

## 2024-09-30 DIAGNOSIS — N17.9 AKI (ACUTE KIDNEY INJURY) (CMS-HCC): ICD-10-CM

## 2024-09-30 DIAGNOSIS — S37.20XA INJURY OF BLADDER, INITIAL ENCOUNTER: ICD-10-CM

## 2024-09-30 DIAGNOSIS — N81.11 MIDLINE CYSTOCELE: ICD-10-CM

## 2024-09-30 DIAGNOSIS — T83.719A: Primary | ICD-10-CM

## 2024-09-30 DIAGNOSIS — R09.89 BRUIT OF RIGHT CAROTID ARTERY: ICD-10-CM

## 2024-09-30 DIAGNOSIS — N81.9 FEMALE GENITAL PROLAPSE, UNSPECIFIED TYPE: ICD-10-CM

## 2024-09-30 DIAGNOSIS — G89.18 POST-OP PAIN: ICD-10-CM

## 2024-09-30 DIAGNOSIS — N39.3 SUI (STRESS URINARY INCONTINENCE, FEMALE): ICD-10-CM

## 2024-09-30 LAB
ALBUMIN SERPL BCP-MCNC: 3.3 G/DL (ref 3.4–5)
ANION GAP SERPL CALC-SCNC: 12 MMOL/L (ref 10–20)
BUN SERPL-MCNC: 16 MG/DL (ref 6–23)
CALCIUM SERPL-MCNC: 8.8 MG/DL (ref 8.6–10.6)
CHLORIDE SERPL-SCNC: 107 MMOL/L (ref 98–107)
CO2 SERPL-SCNC: 28 MMOL/L (ref 21–32)
CREAT SERPL-MCNC: 0.8 MG/DL (ref 0.5–1.05)
EGFRCR SERPLBLD CKD-EPI 2021: 79 ML/MIN/1.73M*2
ERYTHROCYTE [DISTWIDTH] IN BLOOD BY AUTOMATED COUNT: 12.2 % (ref 11.5–14.5)
GLUCOSE SERPL-MCNC: 67 MG/DL (ref 74–99)
HCT VFR BLD AUTO: 34.1 % (ref 36–46)
HGB BLD-MCNC: 11.1 G/DL (ref 12–16)
MAGNESIUM SERPL-MCNC: 2.49 MG/DL (ref 1.6–2.4)
MCH RBC QN AUTO: 30.7 PG (ref 26–34)
MCHC RBC AUTO-ENTMCNC: 32.6 G/DL (ref 32–36)
MCV RBC AUTO: 95 FL (ref 80–100)
NRBC BLD-RTO: 0 /100 WBCS (ref 0–0)
PHOSPHATE SERPL-MCNC: 3.6 MG/DL (ref 2.5–4.9)
PLATELET # BLD AUTO: 300 X10*3/UL (ref 150–450)
POTASSIUM SERPL-SCNC: 4.9 MMOL/L (ref 3.5–5.3)
RBC # BLD AUTO: 3.61 X10*6/UL (ref 4–5.2)
SODIUM SERPL-SCNC: 142 MMOL/L (ref 136–145)
WBC # BLD AUTO: 7.3 X10*3/UL (ref 4.4–11.3)

## 2024-09-30 PROCEDURE — 2500000004 HC RX 250 GENERAL PHARMACY W/ HCPCS (ALT 636 FOR OP/ED)

## 2024-09-30 PROCEDURE — 83735 ASSAY OF MAGNESIUM: CPT

## 2024-09-30 PROCEDURE — 96366 THER/PROPH/DIAG IV INF ADDON: CPT

## 2024-09-30 PROCEDURE — 36415 COLL VENOUS BLD VENIPUNCTURE: CPT

## 2024-09-30 PROCEDURE — 1170000001 HC PRIVATE ONCOLOGY ROOM DAILY

## 2024-09-30 PROCEDURE — 80069 RENAL FUNCTION PANEL: CPT

## 2024-09-30 PROCEDURE — 96365 THER/PROPH/DIAG IV INF INIT: CPT

## 2024-09-30 PROCEDURE — 99024 POSTOP FOLLOW-UP VISIT: CPT | Performed by: STUDENT IN AN ORGANIZED HEALTH CARE EDUCATION/TRAINING PROGRAM

## 2024-09-30 PROCEDURE — 2500000005 HC RX 250 GENERAL PHARMACY W/O HCPCS

## 2024-09-30 PROCEDURE — 85027 COMPLETE CBC AUTOMATED: CPT

## 2024-09-30 PROCEDURE — 2500000001 HC RX 250 WO HCPCS SELF ADMINISTERED DRUGS (ALT 637 FOR MEDICARE OP)

## 2024-09-30 RX ORDER — AMOXICILLIN AND CLAVULANATE POTASSIUM 875; 125 MG/1; MG/1
1 TABLET, FILM COATED ORAL EVERY 12 HOURS SCHEDULED
Status: DISCONTINUED | OUTPATIENT
Start: 2024-10-01 | End: 2024-10-01 | Stop reason: HOSPADM

## 2024-09-30 RX ORDER — LIDOCAINE 560 MG/1
1 PATCH PERCUTANEOUS; TOPICAL; TRANSDERMAL DAILY
Status: DISCONTINUED | OUTPATIENT
Start: 2024-09-30 | End: 2024-10-01 | Stop reason: HOSPADM

## 2024-09-30 RX ORDER — CLINDAMYCIN PHOSPHATE 900 MG/50ML
900 INJECTION, SOLUTION INTRAVENOUS EVERY 8 HOURS
Status: DISCONTINUED | OUTPATIENT
Start: 2024-09-30 | End: 2024-09-30

## 2024-09-30 RX ORDER — AMOXICILLIN 250 MG
2 CAPSULE ORAL 2 TIMES DAILY
Status: DISCONTINUED | OUTPATIENT
Start: 2024-09-30 | End: 2024-10-01 | Stop reason: HOSPADM

## 2024-09-30 RX ORDER — ACETAMINOPHEN 325 MG/1
1000 TABLET ORAL EVERY 8 HOURS
Status: DISCONTINUED | OUTPATIENT
Start: 2024-09-30 | End: 2024-10-01 | Stop reason: HOSPADM

## 2024-09-30 RX ORDER — TRAMADOL HYDROCHLORIDE 50 MG/1
50 TABLET ORAL EVERY 6 HOURS PRN
Status: DISCONTINUED | OUTPATIENT
Start: 2024-09-30 | End: 2024-10-01 | Stop reason: HOSPADM

## 2024-09-30 RX ORDER — AMLODIPINE BESYLATE 5 MG/1
5 TABLET ORAL DAILY
Status: DISCONTINUED | OUTPATIENT
Start: 2024-09-30 | End: 2024-10-01 | Stop reason: HOSPADM

## 2024-09-30 RX ORDER — ESTRADIOL 0.1 MG/G
2 CREAM VAGINAL DAILY
Status: DISCONTINUED | OUTPATIENT
Start: 2024-09-30 | End: 2024-10-01 | Stop reason: HOSPADM

## 2024-09-30 RX ORDER — SODIUM CHLORIDE, SODIUM LACTATE, POTASSIUM CHLORIDE, CALCIUM CHLORIDE 600; 310; 30; 20 MG/100ML; MG/100ML; MG/100ML; MG/100ML
100 INJECTION, SOLUTION INTRAVENOUS CONTINUOUS
Status: DISCONTINUED | OUTPATIENT
Start: 2024-09-30 | End: 2024-09-30

## 2024-09-30 RX ORDER — ATORVASTATIN CALCIUM 40 MG/1
40 TABLET, FILM COATED ORAL DAILY
Status: DISCONTINUED | OUTPATIENT
Start: 2024-09-30 | End: 2024-10-01 | Stop reason: HOSPADM

## 2024-09-30 RX ORDER — ADHESIVE BANDAGE
30 BANDAGE TOPICAL DAILY PRN
Status: DISCONTINUED | OUTPATIENT
Start: 2024-09-30 | End: 2024-10-01 | Stop reason: HOSPADM

## 2024-09-30 RX ORDER — ASPIRIN 81 MG/1
81 TABLET ORAL NIGHTLY
Status: DISCONTINUED | OUTPATIENT
Start: 2024-09-30 | End: 2024-10-01 | Stop reason: HOSPADM

## 2024-09-30 RX ORDER — POLYETHYLENE GLYCOL 3350 17 G/17G
17 POWDER, FOR SOLUTION ORAL DAILY
Status: DISCONTINUED | OUTPATIENT
Start: 2024-09-30 | End: 2024-10-01 | Stop reason: HOSPADM

## 2024-09-30 RX ORDER — LEVOTHYROXINE SODIUM 100 UG/1
100 TABLET ORAL DAILY
Status: DISCONTINUED | OUTPATIENT
Start: 2024-09-30 | End: 2024-10-01 | Stop reason: HOSPADM

## 2024-09-30 RX ORDER — OXYCODONE HYDROCHLORIDE 5 MG/1
5 TABLET ORAL EVERY 6 HOURS PRN
Status: DISCONTINUED | OUTPATIENT
Start: 2024-09-30 | End: 2024-10-01 | Stop reason: HOSPADM

## 2024-09-30 RX ORDER — DOXYCYCLINE HYCLATE 100 MG
100 TABLET ORAL EVERY 12 HOURS SCHEDULED
Status: DISCONTINUED | OUTPATIENT
Start: 2024-10-01 | End: 2024-10-01 | Stop reason: HOSPADM

## 2024-09-30 RX ORDER — HYDROMORPHONE HYDROCHLORIDE 1 MG/ML
0.5 INJECTION, SOLUTION INTRAMUSCULAR; INTRAVENOUS; SUBCUTANEOUS EVERY 4 HOURS PRN
Status: DISCONTINUED | OUTPATIENT
Start: 2024-09-30 | End: 2024-10-01 | Stop reason: HOSPADM

## 2024-09-30 RX ORDER — HEPARIN SODIUM 5000 [USP'U]/ML
5000 INJECTION, SOLUTION INTRAVENOUS; SUBCUTANEOUS EVERY 8 HOURS SCHEDULED
Status: DISCONTINUED | OUTPATIENT
Start: 2024-09-30 | End: 2024-10-01 | Stop reason: HOSPADM

## 2024-09-30 SDOH — SOCIAL STABILITY: SOCIAL INSECURITY: ABUSE: ADULT

## 2024-09-30 SDOH — SOCIAL STABILITY: SOCIAL INSECURITY: DO YOU FEEL UNSAFE GOING BACK TO THE PLACE WHERE YOU ARE LIVING?: NO

## 2024-09-30 SDOH — ECONOMIC STABILITY: FOOD INSECURITY: WITHIN THE PAST 12 MONTHS, THE FOOD YOU BOUGHT JUST DIDN'T LAST AND YOU DIDN'T HAVE MONEY TO GET MORE.: NEVER TRUE

## 2024-09-30 SDOH — SOCIAL STABILITY: SOCIAL INSECURITY: ARE YOU OR HAVE YOU BEEN THREATENED OR ABUSED PHYSICALLY, EMOTIONALLY, OR SEXUALLY BY ANYONE?: NO

## 2024-09-30 SDOH — SOCIAL STABILITY: SOCIAL INSECURITY: WITHIN THE LAST YEAR, HAVE YOU BEEN AFRAID OF YOUR PARTNER OR EX-PARTNER?: NO

## 2024-09-30 SDOH — SOCIAL STABILITY: SOCIAL INSECURITY: HAS ANYONE EVER THREATENED TO HURT YOUR FAMILY OR YOUR PETS?: NO

## 2024-09-30 SDOH — ECONOMIC STABILITY: INCOME INSECURITY: IN THE LAST 12 MONTHS, WAS THERE A TIME WHEN YOU WERE NOT ABLE TO PAY THE MORTGAGE OR RENT ON TIME?: NO

## 2024-09-30 SDOH — ECONOMIC STABILITY: HOUSING INSECURITY: AT ANY TIME IN THE PAST 12 MONTHS, WERE YOU HOMELESS OR LIVING IN A SHELTER (INCLUDING NOW)?: NO

## 2024-09-30 SDOH — ECONOMIC STABILITY: HOUSING INSECURITY: IN THE PAST 12 MONTHS, HOW MANY TIMES HAVE YOU MOVED WHERE YOU WERE LIVING?: 1

## 2024-09-30 SDOH — SOCIAL STABILITY: SOCIAL INSECURITY: WITHIN THE LAST YEAR, HAVE YOU BEEN HUMILIATED OR EMOTIONALLY ABUSED IN OTHER WAYS BY YOUR PARTNER OR EX-PARTNER?: NO

## 2024-09-30 SDOH — ECONOMIC STABILITY: FOOD INSECURITY: WITHIN THE PAST 12 MONTHS, YOU WORRIED THAT YOUR FOOD WOULD RUN OUT BEFORE YOU GOT MONEY TO BUY MORE.: NEVER TRUE

## 2024-09-30 SDOH — SOCIAL STABILITY: SOCIAL INSECURITY: DOES ANYONE TRY TO KEEP YOU FROM HAVING/CONTACTING OTHER FRIENDS OR DOING THINGS OUTSIDE YOUR HOME?: NO

## 2024-09-30 SDOH — ECONOMIC STABILITY: INCOME INSECURITY: HOW HARD IS IT FOR YOU TO PAY FOR THE VERY BASICS LIKE FOOD, HOUSING, MEDICAL CARE, AND HEATING?: NOT HARD AT ALL

## 2024-09-30 SDOH — SOCIAL STABILITY: SOCIAL INSECURITY: HAVE YOU HAD ANY THOUGHTS OF HARMING ANYONE ELSE?: NO

## 2024-09-30 SDOH — SOCIAL STABILITY: SOCIAL INSECURITY: DO YOU FEEL ANYONE HAS EXPLOITED OR TAKEN ADVANTAGE OF YOU FINANCIALLY OR OF YOUR PERSONAL PROPERTY?: NO

## 2024-09-30 SDOH — SOCIAL STABILITY: SOCIAL INSECURITY: ARE THERE ANY APPARENT SIGNS OF INJURIES/BEHAVIORS THAT COULD BE RELATED TO ABUSE/NEGLECT?: NO

## 2024-09-30 SDOH — SOCIAL STABILITY: SOCIAL INSECURITY: WERE YOU ABLE TO COMPLETE ALL THE BEHAVIORAL HEALTH SCREENINGS?: YES

## 2024-09-30 SDOH — SOCIAL STABILITY: SOCIAL INSECURITY: HAVE YOU HAD THOUGHTS OF HARMING ANYONE ELSE?: NO

## 2024-09-30 ASSESSMENT — ACTIVITIES OF DAILY LIVING (ADL)
PATIENT'S MEMORY ADEQUATE TO SAFELY COMPLETE DAILY ACTIVITIES?: YES
HEARING - RIGHT EAR: FUNCTIONAL
GROOMING: INDEPENDENT
LACK_OF_TRANSPORTATION: NO
TOILETING: INDEPENDENT
HEARING - LEFT EAR: FUNCTIONAL
WALKS IN HOME: INDEPENDENT
BATHING: INDEPENDENT
JUDGMENT_ADEQUATE_SAFELY_COMPLETE_DAILY_ACTIVITIES: YES
FEEDING YOURSELF: INDEPENDENT
ASSISTIVE_DEVICE: EYEGLASSES
DRESSING YOURSELF: INDEPENDENT
ADEQUATE_TO_COMPLETE_ADL: YES

## 2024-09-30 ASSESSMENT — COLUMBIA-SUICIDE SEVERITY RATING SCALE - C-SSRS
1. IN THE PAST MONTH, HAVE YOU WISHED YOU WERE DEAD OR WISHED YOU COULD GO TO SLEEP AND NOT WAKE UP?: NO
6. HAVE YOU EVER DONE ANYTHING, STARTED TO DO ANYTHING, OR PREPARED TO DO ANYTHING TO END YOUR LIFE?: NO
2. HAVE YOU ACTUALLY HAD ANY THOUGHTS OF KILLING YOURSELF?: NO

## 2024-09-30 ASSESSMENT — COGNITIVE AND FUNCTIONAL STATUS - GENERAL
PATIENT BASELINE BEDBOUND: NO
MOBILITY SCORE: 24
MOBILITY SCORE: 24
DAILY ACTIVITIY SCORE: 24
MOBILITY SCORE: 24
DAILY ACTIVITIY SCORE: 24
DAILY ACTIVITIY SCORE: 24

## 2024-09-30 ASSESSMENT — PAIN SCALES - GENERAL
PAINLEVEL_OUTOF10: 2
PAINLEVEL_OUTOF10: 5 - MODERATE PAIN
PAINLEVEL_OUTOF10: 7
PAINLEVEL_OUTOF10: 2
PAINLEVEL_OUTOF10: 2
PAINLEVEL_OUTOF10: 5 - MODERATE PAIN

## 2024-09-30 ASSESSMENT — PAIN DESCRIPTION - ORIENTATION: ORIENTATION: LOWER

## 2024-09-30 ASSESSMENT — LIFESTYLE VARIABLES
HOW MANY STANDARD DRINKS CONTAINING ALCOHOL DO YOU HAVE ON A TYPICAL DAY: PATIENT DOES NOT DRINK
AUDIT-C TOTAL SCORE: 0
SKIP TO QUESTIONS 9-10: 1
PRESCIPTION_ABUSE_PAST_12_MONTHS: NO
SUBSTANCE_ABUSE_PAST_12_MONTHS: NO
HOW OFTEN DO YOU HAVE 6 OR MORE DRINKS ON ONE OCCASION: NEVER
HOW OFTEN DO YOU HAVE A DRINK CONTAINING ALCOHOL: NEVER
AUDIT-C TOTAL SCORE: 0

## 2024-09-30 ASSESSMENT — PAIN - FUNCTIONAL ASSESSMENT
PAIN_FUNCTIONAL_ASSESSMENT: 0-10

## 2024-09-30 ASSESSMENT — PATIENT HEALTH QUESTIONNAIRE - PHQ9
2. FEELING DOWN, DEPRESSED OR HOPELESS: NOT AT ALL
1. LITTLE INTEREST OR PLEASURE IN DOING THINGS: NOT AT ALL
SUM OF ALL RESPONSES TO PHQ9 QUESTIONS 1 & 2: 0

## 2024-09-30 ASSESSMENT — PAIN SCALES - PAIN ASSESSMENT IN ADVANCED DEMENTIA (PAINAD): TOTALSCORE: MEDICATION (SEE MAR)

## 2024-09-30 ASSESSMENT — PAIN DESCRIPTION - LOCATION: LOCATION: GROIN

## 2024-09-30 NOTE — PROGRESS NOTES
Per the medical team, this patient has no anticipated discharge needs; full assessment deferred at this time. Will continue to follow for any home going needs that arise.   Martha Botello RN [facility]

## 2024-09-30 NOTE — H&P
History Of Present Illness  Verenice Sotelo is a 69 y.o. female who presented to [facility] on 9/27 with vulvar pain and swelling, nausea, and vomiting in the setting of recent sling and lap supracervical hysterectomy and sacrocolpopexy with Dr. Johnson on 9/16. CT cystogram showed a tract from the bladder to the subcutaneous tissue. Teresa catheter was placed and cystoscopy showed small area of eroded mesh along the passage of the left sling arm. On 09/29 worsening abdominal pain was noted and repeat CT showed subcutaneous gas on the left side of mons pubis and tracking with new gas bubbles.  This could have been likely secondary to recent instrumentation but clindamycin was initiated and the patient was transferred to [facility].    Past Medical History  She has a past medical history of Acute maxillary sinusitis, unspecified (10/26/2016), Arthritis, Diverticulosis, Encounter for immunization (01/09/2020), Encounter for immunization (09/19/2016), Encounter for immunization (09/19/2016), Encounter for screening for other viral diseases (12/01/2017), Female genital prolapse, Hyperlipidemia, Hypertension, Hypothyroidism, Midline cystocele, OAB (overactive bladder), Osteopenia, Personal history of other specified conditions (04/23/2015), Raynaud's disease, Right carotid bruit, FRANCIS (stress urinary incontinence, female), Wears glasses, and Wears partial dentures.    Surgical History  She has a past surgical history that includes Tubal ligation (10/18/2013); Cataract extraction (06/11/2015); Hysterectomy (N/A, 09/16/2024); Urethral sling (N/A, 09/16/2024); and Sacrocolpopexy (N/A, 09/16/2024).     Social History  She reports that she has never smoked. She has never used smokeless tobacco. She reports that she does not drink alcohol and does not use drugs.    Family History  Family History   Problem Relation Name Age of Onset    Cervical cancer Mother      Prostate cancer Father      Diabetes type II Father      Aneurysm Sister    "       Ruptured Cerebral    Hyperlipidemia Brother      Diabetes type II Brother          Allergies  Lisinopril and Sulfa (sulfonamide antibiotics)    Review of Systems  Positive suprapubic and labial tenderness. ROS otherwise negative     Physical Exam  Constitutional: awake and alert, resting comfortably in bed in no acute distress  Head/neck: normocephalic / atraumatic  Eyes: EOMI, sclerae anicteric  ENT: moist mucous membranes  Pulm: Breathing comfortably on room air  CV: Regular rate  Abd: Soft, nontender, nondistended  : Moderate suprapubic tenderness. Mons pubis and left labia swollen and tender. Teresa in place draining clear yellow urine  Psych: Appropriate mood and behavior     Last Recorded Vitals  Blood pressure 128/68, pulse 58, temperature 36.3 °C (97.4 °F), temperature source Temporal, resp. rate 16, height 1.702 m (5' 7\"), weight 72.6 kg (160 lb), SpO2 98%.    Relevant Results    Results for orders placed or performed during the hospital encounter of 09/27/24 (from the past 24 hour(s))   CBC   Result Value Ref Range    WBC 10.2 4.4 - 11.3 x10*3/uL    nRBC 0.0 0.0 - 0.0 /100 WBCs    RBC 3.63 (L) 4.00 - 5.20 x10*6/uL    Hemoglobin 11.0 (L) 12.0 - 16.0 g/dL    Hematocrit 33.8 (L) 36.0 - 46.0 %    MCV 93 80 - 100 fL    MCH 30.3 26.0 - 34.0 pg    MCHC 32.5 32.0 - 36.0 g/dL    RDW 12.4 11.5 - 14.5 %    Platelets 286 150 - 450 x10*3/uL   Renal Function Panel   Result Value Ref Range    Glucose 95 74 - 99 mg/dL    Sodium 140 136 - 145 mmol/L    Potassium 3.8 3.5 - 5.3 mmol/L    Chloride 107 98 - 107 mmol/L    Bicarbonate 27 21 - 32 mmol/L    Anion Gap 10 10 - 20 mmol/L    Urea Nitrogen 20 6 - 23 mg/dL    Creatinine 0.86 0.50 - 1.05 mg/dL    eGFR 73 >60 mL/min/1.73m*2    Calcium 8.5 (L) 8.6 - 10.3 mg/dL    Phosphorus 2.7 2.5 - 4.9 mg/dL    Albumin 3.1 (L) 3.4 - 5.0 g/dL   Magnesium   Result Value Ref Range    Magnesium 2.21 1.60 - 2.40 mg/dL       CT abdomen pelvis wo IV contrast    Addendum Date: 9/29/2024  "   Interpreted By:  Albert Braun, ADDENDUM: I did not review the CT cystogram before submitting my original report   All of the gas bubbles I described as new on CT dated 29 September 2024 RN the same anatomic locations where contrast instilled in the urinary bladder accumulated via the fistula   In light of the CT cystogram and after helpful conversation with Dr. Johnson, I do not suspect the gas in the mons pubis and ventral abdominal wall on today's exam is due to an aggressive subcutaneous infection. The gas bubbles I described in my report are expected based on the presence of the fistula   Signed by: Albert Braun 9/29/2024 1:52 PM   -------- ORIGINAL REPORT -------- Dictation workstation:   [id number]    Result Date: 9/29/2024  Interpreted By:  Albert Braun, STUDY: CT ABDOMEN PELVIS WO IV CONTRAST;  9/29/2024 1:31 pm   INDICATION: Signs/Symptoms:worsening abdominal pain, pt. s/p bladder wall repair.     COMPARISON: CT abdomen and pelvis without contrast 27 September 2024   ACCESSION NUMBER(S): [id number]   ORDERING CLINICIAN: RENEA VENTURA   TECHNIQUE: CT of the abdomen and pelvis from the lung bases through the symphysis pubis without oral or IV contrast   FINDINGS: When compared to two days prior, again without IV or oral contrast or contrast in the urinary bladder, the only interval changes are as follows:   New well-positioned Teresa catheter with complete decompression of the urinary bladder;   New subcutaneous gas bubbles in the left mons pubis tracking cephalad up the left ventral pelvic and abdominal wall;   Most if not all of the free pelvic fluid sitting dependently has resolved;   Nearly all of the fluid in the pre vesicular space has resolved with new gas in this space as well;   Marginal interval decrease in size of the flat platelike collection in the left ventral pelvic and abdominal wall, but new gas within that space as well;   No other interval change   Extensive inflammatory fat stranding in the  mons pubis and extending cephalad from there and edema in the left labia   No formed/drainable abscess   No increasing fluid near the urinary bladder or elsewhere   No acute process has developed in the abdomen   Still no hydronephrosis or urinary stone, acute pancreatitis or any other acute solid organ findings. Still no gallbladder or biliary duct dilation. Still no acute appendicitis, diverticulitis, other colitis, bowel obstruction or perforation       New subcutaneous gas on the left side of the mons pubis and tracking cephalad from there, with new gas bubbles in the pre vesicular space as well as in the flat platelike ventral abdominal/pelvic wall collection that has marginally decreased in size. If there has not been surgery, an aggressive subcutaneous infection could have such an appearance   Well-positioned Teresa catheter with complete decompression of the urinary bladder. No increasing fluid or gas around the urinary bladder. Most if not all of the free pelvic fluid that was present two days ago is no longer present   No formed/drainable abscess   MACRO: Critical Finding:  See findings. Notification was initiated on 9/29/2024 at 1:46 pm by  Albert Braun.  (**-OCF-**) Instructions:   Signed by: Albert Braun 9/29/2024 1:46 PM Dictation workstation:   [id number]    CT cystogram    Result Date: 9/27/2024  Interpreted By:  Rosy Nash, STUDY: CT CYSTOGRAM;  9/27/2024 4:15 pm   INDICATION: Signs/Symptoms:Increased suprapubic pain s/p sling placement and laparoscopic supracervical hysterectomy on 09/16/2024     COMPARISON: None.   ACCESSION NUMBER(S): [id number]   ORDERING CLINICIAN: RENEA VENTURA   TECHNIQUE: CT examination of the pelvis is performed with sagittal and coronal reformations completed. Initially, images were obtained with no contrast administered. Subsequently, 50 mL of dilute contrast was instilled through the Teresa catheter into the urinary bladder for the study.   FINDINGS: There is a Teresa  catheter satisfactorily positioned within the urinary bladder. With instillation of contrast through the Teresa catheter, there is extraperitoneal contrast extravasation with the site of bladder rupture identified along the left anterolateral wall of the urinary bladder. There is communication of the extraperitoneal extravasation of the urine with the left inguinal canal with a small tract of contrast extending into the left inguinal region. There is also a small tract of contrast extending into a subcutaneous fluid collection located just external to the rectus sheath inferiorly.   In addition, there is intraperitoneal fluid within the pelvis and along the pericolic gutters, right more so than left. However, the intraperitoneal fluid does not exhibit contrast opacification after filling of the urinary bladder with the dilute contrast. There is also fluid accumulated within the subcutaneous tissues of each flank with induration and edema of the mons pubis and with fluid accumulated along the left side of the mons pubis.   The uterus has been resected.       1.  There is an extraperitoneal bladder rupture identified along the left anterolateral wall of the urinary bladder. The extraperitoneal rupture also exhibits 2 tracts extending into the left inguinal region as well as extending into a deep subcutaneous fluid collection seen just external to the rectus sheath inferiorly. There is edema of the subcutaneous tissues along the mons pubis particularly along the left side of the mons pubis. 2. There is also free intraperitoneal fluid within the pelvis and along the pericolic gutters without opacification following contrast instillation into the urinary bladder. The free intraperitoneal fluid is most likely normal postoperative finding.   MACRO: None   Signed by: Rosy Nash 9/27/2024 4:58 PM Dictation workstation:   [id number]    CT abdomen pelvis wo IV contrast    Result Date: 9/27/2024  Interpreted By:  Martha  Reed, STUDY: CT ABDOMEN PELVIS WO IV CONTRAST; 9/27/2024 12:37 pm   INDICATION: Signs/Symptoms:lower abd pain and swelling with recent surgery   COMPARISON: None   ACCESSION NUMBER(S): [id number]   ORDERING CLINICIAN: KAREN BANDA   TECHNIQUE: Spiral axial unenhanced images were obtained from the upper abdomen to the symphysis pubis. Oral contrast was not administered.   All CT examinations are performed with one or more of the following dose reduction techniques: Automated Exposure Control, adjustment of mA and/or kV according to patient size, or use of iterative reconstruction techniques.   FINDINGS: Lung bases: The lung bases are clear. Liver: Normal in size without focal lesions. Gallbladder: Grossly unremarkable. Spleen: Normal in size without focal lesions. Pancreas: Unremarkable. Adrenal glands: No focal lesions. The kidneys are normal in size and position. There are no renal calculi or hydronephrosis. No abnormal calcifications are seen within the course of the ureters or within the bladder. Pelvic reproductive organs: Unremarkable.   Small hiatal hernia is noted. Several colonic diverticula are present, without acute diverticulitis. There is no bowel obstruction. The appendix is seen and appears normal. There is free fluid in the abdomen and pelvis, however there is no pneumoperitoneum or abscess. There is also fat stranding of the subcutaneous soft tissues of the anterior pelvic wall, extending inferiorly to the perineum. There is also elongated fluid collection anteriorly to the left anterior pelvic wall measuring up to 2 cm in thickness, most likely a postsurgical seroma. Mild atherosclerotic calcifications involve the aorta, without focal aneurysm.       1. Ascites in the abdomen and pelvis without pneumoperitoneum or intraperitoneal abscess; correlate clinically. 2. Fat stranding involving the lower abdominal/pelvic subcutaneous tissues extending inferiorly to the level of the perineum, with an  elongated fluid collection anteriorly to the left anterior pelvic musculature, most likely a postsurgical seroma. Correlate clinically and follow-up as needed. 3. Small hiatal hernia. Colonic diverticulosis without acute diverticulitis.   Signed by: Reed Thomas 9/27/2024 1:05 PM Dictation workstation:   [id number]       Assessment/Plan   Assessment & Plan      Verenice Sotelo is a 69 y.o. female with mesh erosion through the left bladder wall the setting of recent sling and lap supracervical hysterectomy and sacrocolpopexy with Dr. Johnson on 9/16.     Plan:  - NPO  - If concern for severe infection or progression of symptoms arises, patient may need operative intervention for mesh excision and debridement  - Continue clindamycin   - Serial abdominal / labial exams  - Will continue to closely monitor    Mario Yo MD  Urologic Surgery PGY-3  Adult Urology: [id number]    Pediatric Urology: [id number]

## 2024-09-30 NOTE — PROGRESS NOTES
Pharmacy Medication History Review    Verenice Sotelo is a 69 y.o. female admitted for No Principal Problem: There is no principal problem currently on the Problem List. Please update the Problem List and refresh.. Pharmacy reviewed the patient's [id number] medications and allergies for accuracy.    Medications ADDED:  none  Medications CHANGED:  -none  Medications REMOVED:   none    The list below reflects the updated PTA list.   Prior to Admission Medications   Prescriptions Last Dose Informant   acetaminophen (Tylenol) 500 mg tablet  Self   Sig: Take 2 tablets (1,000 mg) by mouth every 6 hours if needed for mild pain (1 - 3) for up to 5 days.   amLODIPine (Norvasc) 5 mg tablet More than a month Self   Sig: Take 1 tablet (5 mg) by mouth once daily.   Patient not taking: Reported on 9/30/2024   aspirin 81 mg EC tablet  Self   Sig: Take 1 tablet (81 mg) by mouth once daily at bedtime.   atorvastatin (Lipitor) 40 mg tablet  Self   Sig: Take 1 tablet (40 mg) by mouth once daily.   calcium carbonate-vitamin D3 600 mg-20 mcg (800 unit) tablet  Self   Sig: Take 1 tablet by mouth 2 times a day.   estradiol (Estrace) 0.01 % (0.1 mg/gram) vaginal cream  Self   Sig: Insert 0.5 Applicatorfuls (2 g) into the vagina once daily.   hydroCHLOROthiazide (Microzide) 12.5 mg tablet More than a month Self   Sig: Take 1 tablet (12.5 mg) by mouth once daily.   Patient not taking: Reported on 9/30/2024   levothyroxine (Synthroid, Levoxyl) 100 mcg tablet  Self   Sig: Take 1 tablet (100 mcg) by mouth once daily.   magnesium hydroxide (Milk of Magnesia) 400 mg/5 mL suspension  Self   Sig: Take 15 mL by mouth once daily as needed for constipation (if have not had a BM by postoperative day 3).   meloxicam (Mobic) 7.5 mg tablet  Self   Sig: Take 1 tablet (7.5 mg) by mouth once daily.   multivitamin with minerals tablet  Self   Sig: Take 2 tablets by mouth once daily.   mupirocin (Bactroban) 2 % ointment     Sig: Apply topically 3  "times a day for 10 days. apply to affected area   polyethylene glycol (Glycolax, Miralax) 17 gram/dose powder  Self   Sig: DISSOLVE 17 GRAMS IN 8 OZ OF FLUID LIQUID DRINK DAILY AS DIRECTED   tamsulosin (Flomax) 0.4 mg 24 hr capsule  Self   Sig: Take 3 days before surgery and 7 days after   Patient not taking: Reported on 9/16/2024      Facility-Administered Medications: None        The list below reflects the updated allergy list. Please review each documented allergy for additional clarification and justification.  Allergies  Reviewed by Ashish Smith on 9/30/2024        Severity Reactions Comments    Lisinopril Not Specified Cough     Sulfa (sulfonamide Antibiotics) Low Other, Rash, Unknown             Patient accepts M2B at discharge.     Sources:   Patient interview. She is good historian  Pharmacy dispense history  Chart review  [facility]RS    Additional Comments:  Amlodipine, meloxicam, hydrochlorothiazide-  Patient states she stopped prior to her surgery in mid- Sept. 2024 and has not resumed yet as of 9/30/24 pending discussion with her PCP to see if she still needs to take         ASHISH SMITH  PGY-1 Pharmacy Resident  09/30/24     Secure Chat preferred   If no response call [id number] or [facility] Dual[employer] \"Med Rec\"     "

## 2024-09-30 NOTE — PROGRESS NOTES
[facility]  Daily Progress Note      Patient: Verenice Sotelo  Age/Sex: 69 y.o., female  MRN: [id number]  Admit Date: 9/30/2024   Code Status: Full Code  Length of Stay: 0      Interval History/Overnight Events:   -NAEON  - NPO overnight  - Improved labial pain      Medications:    Current Facility-Administered Medications   Medication Dose Route Frequency Provider Last Rate Last Admin    acetaminophen (Tylenol) tablet 975 mg  975 mg oral q8h Ericka Garg MD        [Held by provider] amLODIPine (Norvasc) tablet 5 mg  5 mg oral Daily Ericka Garg MD        aspirin EC tablet 81 mg  81 mg oral Nightly Ericka Garg MD        atorvastatin (Lipitor) tablet 40 mg  40 mg oral Daily Ericka Garg MD        clindamycin (Cleocin) 900 mg in dextrose 5% IV 50 mL  900 mg intravenous q8h Ericka Garg MD   Stopped at 09/30/24 0336    estradiol (Estrace) 0.01 % (0.1 mg/gram) vaginal cream 2 g  2 g vaginal Daily Ericka Garg MD        heparin (porcine) injection 5,000 Units  5,000 Units subcutaneous q8h [facility] Ericka Garg MD   5,000 Units at 09/30/24 0531    HYDROmorphone (Dilaudid) injection 0.5 mg  0.5 mg intravenous q4h PRN Ericka Garg MD        lactated Ringer's infusion  100 mL/hr intravenous Continuous Andreina Gamez  mL/hr at 09/30/24 0745 100 mL/hr at 09/30/24 0745    levothyroxine (Synthroid, Levoxyl) tablet 100 mcg  100 mcg oral Daily Ericka Garg MD        lidocaine 4 % patch 1 patch  1 patch transdermal Daily Ericka Garg MD        magnesium hydroxide (Milk of Magnesia) 400 mg/5 mL suspension 30 mL  30 mL oral Daily PRN Ericka Garg MD        oxyCODONE (Roxicodone) immediate release tablet 5 mg  5 mg oral q6h PRN Ericka Garg MD        polyethylene glycol (Glycolax, Miralax) packet 17 g  17 g oral Daily Ericka Garg MD        sennosides-docusate sodium (Rachel-Colace) 8.6-50 mg per tablet 2 tablet  2 tablet oral BID Ericka Garg MD        traMADol (Ultram) tablet 50  "mg  50 mg oral q6h PRN Ercika Garg MD           Objective    Vitals:    09/30/24 0037 09/30/24 0500 09/30/24 0802   BP: 128/68 119/69 129/75   BP Location: Left arm  Left arm   Patient Position: Lying  Lying   Pulse: 58 59 59   Resp: 16 16 16   Temp: 36.3 °C (97.4 °F) 36.4 °C (97.6 °F) 36.6 °C (97.9 °F)   TempSrc: Temporal  Temporal   SpO2: 98% 100% 96%   Weight: 72.6 kg (160 lb)     Height: 1.702 m (5' 7\")       09/28 1900 - 09/30 0659  In: 0   Out: 800 [Urine:800]    Physical Exam                                                                                                                        General: resting comfortably in bed  Neuro: alert and oriented, no obvious focal deficit   Head/Neck: normocephalic, atraumatic  ENT: moist mucous membranes  CV: regular rate and rhythm  Pulm: nonlabored breathing on room air, no conversational dyspnea  Abd: soft, nondistended, nontender  : firm and tender on upper mons, erythematous, no signs of necrosis  MSK: VIVEROS, no gross deformities  Psych: mood and behavior normal    Labs    Lab Results   Component Value Date    WBC 10.2 09/29/2024    HGB 11.0 (L) 09/29/2024    HCT 33.8 (L) 09/29/2024    MCV 93 09/29/2024     09/29/2024      Lab Results   Component Value Date    GLUCOSE 95 09/29/2024    CALCIUM 8.5 (L) 09/29/2024     09/29/2024    K 3.8 09/29/2024    CO2 27 09/29/2024     09/29/2024    BUN 20 09/29/2024    CREATININE 0.86 09/29/2024        Imaging  CT AP  IMPRESSION:  New subcutaneous gas on the left side of the mons pubis and tracking  cephalad from there, with new gas bubbles in the pre vesicular space  as well as in the flat platelike ventral abdominal/pelvic wall  collection that has marginally decreased in size. If there has not  been surgery, an aggressive subcutaneous infection could have such an  appearance      Well-positioned Teresa catheter with complete decompression of the  urinary bladder. No increasing fluid or gas around the " urinary  bladder. Most if not all of the free pelvic fluid that was present  two days ago is no longer present      No formed/drainable abscess    Assessment & Plan  Verenice Sotelo is a 69 y.o. female with mesh erosion through the left bladder wall the setting of recent sling and lap supracervical hysterectomy and sacrocolpopexy with Dr. Johnson on 9/16. Pain is improved this morning. Mons is slightly firm, tender and erythematous but no signs of infection or necrosis.    Plan 9/30  - No surgical intervention today, possible OR on Wednesday with Dr. Johnson  - Regular diet  - C/w clindamycin    Neuro  -pain control tylenol, oxy, tramadol, bt dilaudid    Cardiovascular  -vitals/monitoring    Pulm  -encourage IS, OOB    GI  -regular diet  - milk of mag, miralax, senna    /Renal  -IVF   -reed  -Cr    Endo  - continue home levothyroxine  - estradiol vaginal cream    ID  - clindamycin    Heme  -Hgb    MSK  -PT/OT  -encourage ambulation as tolerated    Prophylaxis: SCDs  Dispo: RNF      Seen with chief resident Dr. Foss and discussed with attending Dr. Alex Gamez MD  PGY-1 Urology [place]  Adult Urology Pager: [phone]  Pediatric Urology Pager: [phone]        Attestation Statements      I saw and evaluated the patient today and agree with Dr. Gamez and Dr. Foss's plan and assessment    Will hep lock IV  Change to PO amoxicillin 875 mg + doxycycline 100 mg bid and continue for 7 days total   Will see how she does today and tomorrow, if doing well can dc and have repair in next 1-2 weeks with Dr. Silva  If continues to have pain, plan on excision in OR on Wednesday     Ruben Johnson MD

## 2024-10-01 ENCOUNTER — TELEMEDICINE (OUTPATIENT)
Dept: UROLOGY | Facility: CLINIC | Age: 69
End: 2024-10-01
Payer: COMMERCIAL

## 2024-10-01 ENCOUNTER — PHARMACY VISIT (OUTPATIENT)
Dept: PHARMACY | Facility: CLINIC | Age: 69
End: 2024-10-01
Payer: MEDICARE

## 2024-10-01 VITALS
RESPIRATION RATE: 16 BRPM | HEIGHT: 67 IN | SYSTOLIC BLOOD PRESSURE: 116 MMHG | TEMPERATURE: 97.3 F | DIASTOLIC BLOOD PRESSURE: 67 MMHG | OXYGEN SATURATION: 97 % | WEIGHT: 160 LBS | BODY MASS INDEX: 25.11 KG/M2 | HEART RATE: 63 BPM

## 2024-10-01 DIAGNOSIS — T83.719A: Primary | ICD-10-CM

## 2024-10-01 LAB
ALBUMIN SERPL BCP-MCNC: 3.4 G/DL (ref 3.4–5)
ANION GAP SERPL CALC-SCNC: 12 MMOL/L (ref 10–20)
BUN SERPL-MCNC: 14 MG/DL (ref 6–23)
CALCIUM SERPL-MCNC: 8.9 MG/DL (ref 8.6–10.6)
CHLORIDE SERPL-SCNC: 106 MMOL/L (ref 98–107)
CO2 SERPL-SCNC: 28 MMOL/L (ref 21–32)
CREAT SERPL-MCNC: 0.89 MG/DL (ref 0.5–1.05)
EGFRCR SERPLBLD CKD-EPI 2021: 70 ML/MIN/1.73M*2
ERYTHROCYTE [DISTWIDTH] IN BLOOD BY AUTOMATED COUNT: 12.2 % (ref 11.5–14.5)
GLUCOSE SERPL-MCNC: 77 MG/DL (ref 74–99)
HCT VFR BLD AUTO: 33 % (ref 36–46)
HGB BLD-MCNC: 11 G/DL (ref 12–16)
MAGNESIUM SERPL-MCNC: 2.25 MG/DL (ref 1.6–2.4)
MCH RBC QN AUTO: 30.8 PG (ref 26–34)
MCHC RBC AUTO-ENTMCNC: 33.3 G/DL (ref 32–36)
MCV RBC AUTO: 92 FL (ref 80–100)
NRBC BLD-RTO: 0 /100 WBCS (ref 0–0)
PHOSPHATE SERPL-MCNC: 3.9 MG/DL (ref 2.5–4.9)
PLATELET # BLD AUTO: 310 X10*3/UL (ref 150–450)
POTASSIUM SERPL-SCNC: 4.2 MMOL/L (ref 3.5–5.3)
RBC # BLD AUTO: 3.57 X10*6/UL (ref 4–5.2)
SODIUM SERPL-SCNC: 142 MMOL/L (ref 136–145)
WBC # BLD AUTO: 8.1 X10*3/UL (ref 4.4–11.3)

## 2024-10-01 PROCEDURE — 2500000004 HC RX 250 GENERAL PHARMACY W/ HCPCS (ALT 636 FOR OP/ED)

## 2024-10-01 PROCEDURE — 1111F DSCHRG MED/CURRENT MED MERGE: CPT | Performed by: UROLOGY

## 2024-10-01 PROCEDURE — 2500000001 HC RX 250 WO HCPCS SELF ADMINISTERED DRUGS (ALT 637 FOR MEDICARE OP): Performed by: STUDENT IN AN ORGANIZED HEALTH CARE EDUCATION/TRAINING PROGRAM

## 2024-10-01 PROCEDURE — 85027 COMPLETE CBC AUTOMATED: CPT

## 2024-10-01 PROCEDURE — 99213 OFFICE O/P EST LOW 20 MIN: CPT | Performed by: UROLOGY

## 2024-10-01 PROCEDURE — 2500000005 HC RX 250 GENERAL PHARMACY W/O HCPCS

## 2024-10-01 PROCEDURE — RXMED WILLOW AMBULATORY MEDICATION CHARGE

## 2024-10-01 PROCEDURE — 83735 ASSAY OF MAGNESIUM: CPT

## 2024-10-01 PROCEDURE — 2500000001 HC RX 250 WO HCPCS SELF ADMINISTERED DRUGS (ALT 637 FOR MEDICARE OP)

## 2024-10-01 PROCEDURE — 36415 COLL VENOUS BLD VENIPUNCTURE: CPT

## 2024-10-01 PROCEDURE — 80069 RENAL FUNCTION PANEL: CPT

## 2024-10-01 RX ORDER — OXYCODONE HYDROCHLORIDE 10 MG/1
10 TABLET ORAL EVERY 6 HOURS PRN
Qty: 16 TABLET | Refills: 0 | Status: SHIPPED | OUTPATIENT
Start: 2024-10-01 | End: 2024-10-01 | Stop reason: HOSPADM

## 2024-10-01 RX ORDER — OXYCODONE HYDROCHLORIDE 10 MG/1
10 TABLET ORAL EVERY 6 HOURS PRN
Qty: 16 TABLET | Refills: 0 | Status: SHIPPED | OUTPATIENT
Start: 2024-10-01

## 2024-10-01 RX ORDER — AMOXICILLIN AND CLAVULANATE POTASSIUM 875; 125 MG/1; MG/1
1 TABLET, FILM COATED ORAL EVERY 12 HOURS SCHEDULED
Qty: 10 TABLET | Refills: 0 | Status: SHIPPED | OUTPATIENT
Start: 2024-10-01 | End: 2024-10-06

## 2024-10-01 RX ORDER — DOXYCYCLINE 100 MG/1
100 CAPSULE ORAL EVERY 12 HOURS SCHEDULED
Qty: 10 CAPSULE | Refills: 0 | Status: SHIPPED | OUTPATIENT
Start: 2024-10-01 | End: 2024-10-06

## 2024-10-01 RX ORDER — ACETAMINOPHEN 500 MG
1000 TABLET ORAL EVERY 6 HOURS PRN
Qty: 20 TABLET | Refills: 0 | Status: SHIPPED | OUTPATIENT
Start: 2024-10-01

## 2024-10-01 ASSESSMENT — PAIN DESCRIPTION - ORIENTATION: ORIENTATION: LOWER

## 2024-10-01 ASSESSMENT — COGNITIVE AND FUNCTIONAL STATUS - GENERAL
DAILY ACTIVITIY SCORE: 24
MOBILITY SCORE: 24

## 2024-10-01 ASSESSMENT — PAIN SCALES - GENERAL: PAINLEVEL_OUTOF10: 3

## 2024-10-01 ASSESSMENT — PAIN DESCRIPTION - LOCATION
LOCATION: ABDOMEN
LOCATION: ABDOMEN

## 2024-10-01 NOTE — NURSING NOTE
Teaching on reed care and how to change from leg bag to dependent bag. Pt demonstrated task showing understanding.

## 2024-10-01 NOTE — PROGRESS NOTES
CHIEF COMPLAINT:    A telephone visit (audio only) between the patient (at the originating site) and the provider (at the distant site) was utilized to provide this telehealth service.    Verbal consent was requested and obtained fromNAME@ on this date, [unfilled] , for a telehealth visit.     HISTORY OF PRESENT ILLNESS:      This is a  69 y.o. female who presents for follow-up discussion of management of sling erosion into the bladder.  Patient was discharged today from the hospital on p.o. antibiotics feeling much improved and better.  We talked about the definitive management of the mesh erosion into the bladder.  We discussed with her the following 2 options.  We talked about open excision and repair of the bladder through a extraperitoneal retropubic approach we also talked about endoscopic laser removal of the eroded mesh from the bladder.  Between the 2 options patient is interested in proceeding with a definitive removal of the mesh erosion from the bladder.  We are going to schedule her to have the surgery with me at [facility] in the upcoming few weeks.        Extract from the hospital discharge note  Verenice Sotelo is a 69 y.o. female who presented initially to [facility] on 9/27 with vulvar pain and swelling, nausea, and vomiting in the setting of recent sling and lap supracervical hysterectomy and sacrocolpopexy with Dr. Johnson on 9/16. She was transferred to [facility] on 9/29 and was started on clindamycin, which was eventually switched to PO augmentin and doxycycline on 9/30. She had improved pain during her admission. At discharge, she was tolerating a regular diet and ambulating well, and was sent home with reed catheter. She has follow up with Dr. Silva to discuss removal of eroded mesh.       Review of Systems   All other systems reviewed and are negative.      IMPRESSION AND PLAN:       Verenice Sotelo is a 69 y.o. who presents with mesh erosion from a previous sling  done on September 16.  Patient elected to proceed with a definitive open excision of the mesh from the bladder with repair of the bladder defect.  This will be done in the upcoming few weeks.  Meanwhile she will continue to have an indwelling Teresa catheter to continue to drain the bladder.      All questions and concerns were answered and addressed.  The patient expressed understanding and agrees with the plan.       SIGNATURES  Arvin Silva MD  [phone]

## 2024-10-01 NOTE — CARE PLAN
The patient's goals for the shift include      The clinical goals for the shift include remain free from injury

## 2024-10-01 NOTE — CARE PLAN
The patient's goals for the shift include      The clinical goals for the shift include remain stable and free from injury

## 2024-10-01 NOTE — DISCHARGE SUMMARY
Discharge Diagnosis  Erosion of implanted genitourinary material to surrounding tissue, initial encounter (CMS-HCC)    Issues Requiring Follow-Up  - Follow up with Dr. Silva for removal of eroded mesh    Test Results Pending At Discharge  Pending Labs       Order Current Status    CBC Collected (10/01/24 0726)    Magnesium Collected (10/01/24 0726)    Renal Function Panel Collected (10/01/24 0726)            Hospital Course   Verenice Sotelo is a 69 y.o. female who presented initially to [facility] on 9/27 with vulvar pain and swelling, nausea, and vomiting in the setting of recent sling and lap supracervical hysterectomy and sacrocolpopexy with Dr. Johnson on 9/16. She was transferred to [facility] on 9/29 and was started on clindamycin, which was eventually switched to PO augmentin and doxycycline on 9/30. She had improved pain during her admission. At discharge, she was tolerating a regular diet and ambulating well, and was sent home with reed catheter. She has follow up with Dr. Silva to discuss removal of eroded mesh.     Pertinent Physical Exam At Time of Discharge  Physical Exam    General: resting comfortably in bed  Neuro: alert and oriented, no obvious focal deficit   Head/Neck: normocephalic, atraumatic  ENT: moist mucous membranes  CV: regular rate and rhythm  Pulm: nonlabored breathing on room air, no conversational dyspnea  Abd: soft, nondistended, nontender  : reed catheter draining clear yellow urine, mons is indurated and tender, slightly erythematous  MSK: VIVEROS, no gross deformities  Psych: mood and behavior normal     Home Medications     Medication List      ASK your doctor about these medications     acetaminophen 500 mg tablet; Commonly known as: Tylenol; Take 2 tablets   (1,000 mg) by mouth every 6 hours if needed for mild pain (1 - 3) for up   to 5 days.; Ask about: Should I take this medication?   amLODIPine 5 mg tablet; Commonly known as: Norvasc; Take 1 tablet (5 mg)   by mouth once daily.    aspirin 81 mg EC tablet   atorvastatin 40 mg tablet; Commonly known as: Lipitor; Take 1 tablet (40   mg) by mouth once daily.   calcium carbonate-vitamin D3 600 mg-20 mcg (800 unit) tablet   estradiol 0.01 % (0.1 mg/gram) vaginal cream; Commonly known as:   Estrace; Insert 0.5 Applicatorfuls (2 g) into the vagina once daily.   levothyroxine 100 mcg tablet; Commonly known as: Synthroid, Levoxyl;   Take 1 tablet (100 mcg) by mouth once daily.   magnesium hydroxide 400 mg/5 mL suspension; Commonly known as: Milk of   Magnesia; Take 15 mL by mouth once daily as needed for constipation (if   have not had a BM by postoperative day 3).   multivitamin with minerals tablet   polyethylene glycol 17 gram/dose powder; Commonly known as: Glycolax,   Miralax; DISSOLVE 17 GRAMS IN 8 OZ OF FLUID LIQUID DRINK DAILY AS DIRECTED       Outpatient Follow-Up  Future Appointments   Date Time Provider Department Center   10/1/2024  2:00 PM Arvin Silva MD [id number] [place]   10/3/2024 11:40 AM KACY [id number]   10/21/2024 10:00 AM Fermin SOTELO DO [id number] [facility]   10/31/2024  9:00 AM MD BENI [facility]av112[facility]       Andreina Gamez MD

## 2024-10-02 ENCOUNTER — PATIENT OUTREACH (OUTPATIENT)
Dept: PRIMARY CARE | Facility: CLINIC | Age: 69
End: 2024-10-02
Payer: COMMERCIAL

## 2024-10-02 DIAGNOSIS — Z09 HOSPITAL DISCHARGE FOLLOW-UP: ICD-10-CM

## 2024-10-02 DIAGNOSIS — T83.719A: ICD-10-CM

## 2024-10-02 NOTE — PROGRESS NOTES
TCM completed 10/2/24  Discharge Facility: [facility]  Discharge Diagnosis:  Erosion of implanted genitourinary material to surrounding tissue, initial encounter   Admission Date:  9/30/24  Discharge Date: 10/1/24  PCP Appointment Date: 10/21/24  Specialist Appointment Date: Urology- tomorrow 10/3/24.              Hospital Encounter and Summary Linked: Yes    Issues Requiring Follow-Up:  - Follow up with Dr. Silva for removal of eroded mesh (Surgery 10/14 or 10/28?)    --See discharge assessment below for further details--    Engagement  Call Start Time: 1253 (10/2/2024 12:58 PM)    Medications  Medications reviewed with patient/caregiver?: Yes (10/2/2024 12:58 PM)  Is the patient having any side effects they believe may be caused by any medication additions or changes?: No (10/2/2024 12:58 PM)  Does the patient have all medications ordered at discharge?: Yes (10/2/2024 12:58 PM)  Care Management Interventions: No intervention needed; Provided patient education (10/2/2024 12:58 PM)  Prescription Comments: Start: Augmentin, Doxycycline and Oxycodone. Picked up from  pharmacy. (10/2/2024 12:58 PM)  Is the patient taking all medications as directed (includes completed medication regime)?: Yes (10/2/2024 12:58 PM)  Care Management Interventions: Provided patient education (10/2/2024 12:58 PM)  Medication Comments: See medication list (10/2/2024 12:58 PM)    Appointments  Does the patient have a primary care provider?: Yes (10/2/2024 12:58 PM)  Care Management Interventions: Verified appointment date/time/provider (10/2/2024 12:58 PM)  Has the patient kept scheduled appointments due by today?: Not applicable (10/2/2024 12:58 PM)  Care Management Interventions: Advised patient to keep appointment; Educated on importance of keeping appointment; Advised to schedule with specialist (10/2/2024 12:58 PM)    Self Management  What is the home health agency?: n/a (10/2/2024 12:58 PM)  Has home health visited the  "patient within 72 hours of discharge?: Not applicable (10/2/2024 12:58 PM)  What Durable Medical Equipment (DME) was ordered?: n/a (10/2/2024 12:58 PM)    Patient Teaching  Does the patient have access to their discharge instructions?: Yes (10/2/2024 12:58 PM)  Care Management Interventions: Reviewed instructions with patient; Educated on MyChart (10/2/2024 12:58 PM)  What is the patient's perception of their health status since discharge?: Improving (10/2/2024 12:58 PM)  Is the patient/caregiver able to teach back the hierarchy of who to call/visit for symptoms/problems? PCP, Specialist, Home Health nurse, Urgent Care, ED, 911: Yes (10/2/2024 12:58 PM)  Patient/Caregiver Education Comments: TCM initial outreach post discharge from the hospital completed successfully. Patient states she is home and feels \"much better\" today. Patient states she has her indwelling reed catheter in place; denied any questions/concerns regarding reed. Patient is scheduled to see urology tomorrow and discuss upcoming surgery. Patient states she was told her surgery would be 10/14 or 10/28.  Patient denied any new or worsening symptoms since returning home. Patient denied the need for DME, HHC or assistance obtaining transportation. Patient confirmed she has her discharge summary and all medications needed in the home. Patient states she is tolerating antibiotics well so far, with no adverse reactions to report. TCM phone number was provided to the patient with the patient encouraged to call with any needs or questions that may arise to help prevent another hospitalization. Patient verbalized her understanding and stated she had no further questions or concerns at this time, but would call back if needed. Patient has a PCP appt scheduled for 10/21, but states she may have to cancel this, if she has her surgery. Patient was instructed to call back to re-schedule if needed. (10/2/2024 12:58 PM)    Wrap Up  Wrap Up Additional Comments: " Hospital Course:   69 y.o. female who presented initially to [facility] on 9/27 with vulvar pain and swelling, nausea, and vomiting in the setting of recent sling and lap supracervical hysterectomy and sacrocolpopexy with Dr. Johnson on 9/16. She was transferred to [facility] [place]/29 and was started on clindamycin, which was eventually switched to PO augmentin and doxycycline on 9/30. She had improved pain during her admission. At discharge, she was tolerating a regular diet and ambulating well, and was sent home with reed catheter. She has follow up with Dr. Silva to discuss removal of eroded mesh. (10/2/2024 12:58 PM)  Call End Time: 1255 (10/2/2024 12:58 PM)

## 2024-10-03 ENCOUNTER — APPOINTMENT (OUTPATIENT)
Dept: UROLOGY | Facility: CLINIC | Age: 69
End: 2024-10-03
Payer: COMMERCIAL

## 2024-10-03 NOTE — DOCUMENTATION CLARIFICATION NOTE
"    PATIENT:               AMRITA JOSEPH  ACCT #:                  [id number]  MRN:                       [id number]  :                       1955  ADMIT DATE:       2024 11:06 AM  DISCH DATE:        2024 11:26 PM  RESPONDING PROVIDER #:        [id number]          PROVIDER RESPONSE TEXT:    Bladder rupture was related to or a complication of  Sling placement  and was clinically significant    CDI QUERY TEXT:    Clarification    Instruction:    Based on your assessment of the patient and the clinical information, please provide the requested documentation by clicking on the appropriate radio button and enter any additional information if prompted.    Question: Please further clarify if a relationship exists between Sling placement and bladder rupture    When answering this query, please exercise your independent professional judgment. The fact that a question is being asked, does not imply that any particular answer is desired or expected.    The patient's clinical indicators include:  Clinical Information: 69 year old female with past medication history of HTN, hyperlipidemia, hypothyroidism, Raynauds, genital prolapse s/p bladder sling placement who presented to the hospital for lower abdominal discomfort and vulvar pain/swelling.    Clinical Indicators:  Admitting VS: /81  Pulse 62  Temp 36.6 C (97.9F)   Resp 20   SpO2 98  CT cystogram obtained showed bladder wall rupture  DS noted \"On morning of  patient continued to have worsening abdominal pain.  CTAP done at that time showed subcutaneous gas on the left side of mons pubis and tracking with new gas bubbles.  This could have been likely secondary to recent instrumentation.  After discussion with Dr. Johnson, patient was started on clindamycin.  Given that patient required additional surgical intervention, transfer to [facility] was initiated.\"    Treatment: cystoscopy    Risk Factors: recent sling placement, hysterectomy, and laparoscopic " colpopexy  Options provided:  -- Bladder rupture was related to or a complication of  Sling placement  and was clinically significant  -- Bladder rupture was not related to  Sling placement  -- Other - I will add my own diagnosis  -- Refer to Clinical Documentation Reviewer    Query created by: Jillian Villegas on 10/3/2024 11:25 AM      Electronically signed by:  FLORENCIO VERA MD 10/3/2024 11:37 AM

## 2024-10-07 ENCOUNTER — PREP FOR PROCEDURE (OUTPATIENT)
Dept: UROLOGY | Facility: HOSPITAL | Age: 69
End: 2024-10-07
Payer: COMMERCIAL

## 2024-10-07 DIAGNOSIS — N30.00 ACUTE CYSTITIS WITHOUT HEMATURIA: ICD-10-CM

## 2024-10-07 DIAGNOSIS — T83.718A EROSION OF BLADDER SUSPENSION MESH, INITIAL ENCOUNTER (CMS-HCC): Primary | ICD-10-CM

## 2024-10-14 ENCOUNTER — LAB (OUTPATIENT)
Dept: LAB | Facility: LAB | Age: 69
End: 2024-10-14
Payer: COMMERCIAL

## 2024-10-14 DIAGNOSIS — T83.718A EROSION OF BLADDER SUSPENSION MESH, INITIAL ENCOUNTER (CMS-HCC): ICD-10-CM

## 2024-10-14 LAB
ANION GAP SERPL CALC-SCNC: 15 MMOL/L (ref 10–20)
BUN SERPL-MCNC: 12 MG/DL (ref 6–23)
CALCIUM SERPL-MCNC: 9.1 MG/DL (ref 8.6–10.3)
CHLORIDE SERPL-SCNC: 104 MMOL/L (ref 98–107)
CO2 SERPL-SCNC: 30 MMOL/L (ref 21–32)
CREAT SERPL-MCNC: 0.76 MG/DL (ref 0.5–1.05)
EGFRCR SERPLBLD CKD-EPI 2021: 84 ML/MIN/1.73M*2
ERYTHROCYTE [DISTWIDTH] IN BLOOD BY AUTOMATED COUNT: 13.1 % (ref 11.5–14.5)
GLUCOSE SERPL-MCNC: 82 MG/DL (ref 74–99)
HCT VFR BLD AUTO: 38.4 % (ref 36–46)
HGB BLD-MCNC: 12.2 G/DL (ref 12–16)
MCH RBC QN AUTO: 30.9 PG (ref 26–34)
MCHC RBC AUTO-ENTMCNC: 31.8 G/DL (ref 32–36)
MCV RBC AUTO: 97 FL (ref 80–100)
NRBC BLD-RTO: 0 /100 WBCS (ref 0–0)
PLATELET # BLD AUTO: 276 X10*3/UL (ref 150–450)
POTASSIUM SERPL-SCNC: 4.4 MMOL/L (ref 3.5–5.3)
RBC # BLD AUTO: 3.95 X10*6/UL (ref 4–5.2)
SODIUM SERPL-SCNC: 145 MMOL/L (ref 136–145)
WBC # BLD AUTO: 5.9 X10*3/UL (ref 4.4–11.3)

## 2024-10-14 PROCEDURE — 85027 COMPLETE CBC AUTOMATED: CPT

## 2024-10-14 PROCEDURE — 80048 BASIC METABOLIC PNL TOTAL CA: CPT

## 2024-10-14 PROCEDURE — 36415 COLL VENOUS BLD VENIPUNCTURE: CPT

## 2024-10-16 DIAGNOSIS — R30.0 DYSURIA: Primary | ICD-10-CM

## 2024-10-16 RX ORDER — NITROFURANTOIN 25; 75 MG/1; MG/1
100 CAPSULE ORAL 2 TIMES DAILY
Qty: 10 CAPSULE | Refills: 0 | Status: SHIPPED | OUTPATIENT
Start: 2024-10-16 | End: 2024-10-21

## 2024-10-17 ENCOUNTER — PRE-ADMISSION TESTING (OUTPATIENT)
Dept: PREADMISSION TESTING | Facility: HOSPITAL | Age: 69
End: 2024-10-17
Payer: COMMERCIAL

## 2024-10-17 ENCOUNTER — PATIENT OUTREACH (OUTPATIENT)
Dept: PRIMARY CARE | Facility: CLINIC | Age: 69
End: 2024-10-17

## 2024-10-17 VITALS
RESPIRATION RATE: 16 BRPM | BODY MASS INDEX: 30.09 KG/M2 | WEIGHT: 159.39 LBS | SYSTOLIC BLOOD PRESSURE: 174 MMHG | TEMPERATURE: 97.3 F | OXYGEN SATURATION: 98 % | HEIGHT: 61 IN | DIASTOLIC BLOOD PRESSURE: 73 MMHG | HEART RATE: 61 BPM

## 2024-10-17 DIAGNOSIS — Z01.818 PREOP EXAMINATION: Primary | ICD-10-CM

## 2024-10-17 DIAGNOSIS — Z09 HOSPITAL DISCHARGE FOLLOW-UP: ICD-10-CM

## 2024-10-17 DIAGNOSIS — T83.718S EROSION OF BLADDER SUSPENSION MESH, SEQUELA: ICD-10-CM

## 2024-10-17 PROCEDURE — 99202 OFFICE O/P NEW SF 15 MIN: CPT | Performed by: NURSE PRACTITIONER

## 2024-10-17 ASSESSMENT — DUKE ACTIVITY SCORE INDEX (DASI)
CAN YOU DO LIGHT WORK AROUND THE HOUSE LIKE DUSTING OR WASHING DISHES: YES
CAN YOU WALK INDOORS, SUCH AS AROUND YOUR HOUSE: YES
CAN YOU HAVE SEXUAL RELATIONS: NO
CAN YOU PARTICIPATE IN STRENOUS SPORTS LIKE SWIMMING, SINGLES TENNIS, FOOTBALL, BASKETBALL, OR SKIING: NO
CAN YOU DO MODERATE WORK AROUND THE HOUSE LIKE VACUUMING, SWEEPING FLOORS OR CARRYING GROCERIES: YES
CAN YOU TAKE CARE OF YOURSELF (EAT, DRESS, BATHE, OR USE TOILET): YES
TOTAL_SCORE: 18.95
CAN YOU DO YARD WORK LIKE RAKING LEAVES, WEEDING OR PUSHING A MOWER: NO
DASI METS SCORE: 5.1
CAN YOU PARTICIPATE IN MODERATE RECREATIONAL ACTIVITIES LIKE GOLF, BOWLING, DANCING, DOUBLES TENNIS OR THROWING A BASEBALL OR FOOTBALL: NO
CAN YOU CLIMB A FLIGHT OF STAIRS OR WALK UP A HILL: YES
CAN YOU DO HEAVY WORK AROUND THE HOUSE LIKE SCRUBBING FLOORS OR LIFTING AND MOVING HEAVY FURNITURE: NO
CAN YOU RUN A SHORT DISTANCE: NO
CAN YOU WALK A BLOCK OR TWO ON LEVEL GROUND: YES

## 2024-10-17 ASSESSMENT — PAIN - FUNCTIONAL ASSESSMENT: PAIN_FUNCTIONAL_ASSESSMENT: 0-10

## 2024-10-17 ASSESSMENT — ACTIVITIES OF DAILY LIVING (ADL): ADL_SCORE: 0

## 2024-10-17 ASSESSMENT — LIFESTYLE VARIABLES: SMOKING_STATUS: NONSMOKER

## 2024-10-17 ASSESSMENT — PAIN SCALES - GENERAL: PAINLEVEL_OUTOF10: 0 - NO PAIN

## 2024-10-17 NOTE — CPM/PAT H&P
CPM/PAT Evaluation       Name: Verenice OREILLY Ashleigh (Verenice L Ashleigh)  /Age: 1955/69 y.o.     In-Person     HPI 70 yo female with history of HTN, HLD, Hypothyroidism, OAB and bladder prolapse s/p partial hysterectomy and sling 2024 is here today for preop evaluation for mesh erosion. Presently on antibiotics for possible UTI- has reed catheter. Denies fever, chills or sweats.    Past Medical History:   Diagnosis Date    Acute maxillary sinusitis, unspecified 10/26/2016    Acute maxillary sinusitis    Arthritis     Diverticulosis     Encounter for immunization 2020    Need for shingles vaccine    Encounter for immunization 2016    Need for shingles vaccine    Encounter for immunization 2016    Needs flu shot    Encounter for screening for other viral diseases 2017    Need for hepatitis C screening test    Female genital prolapse     Hyperlipidemia     Hypertension     Hypothyroidism     Midline cystocele     OAB (overactive bladder)     Osteopenia     Personal history of other specified conditions 2015    History of diarrhea    Raynaud's disease     Right carotid bruit     FRANCIS (stress urinary incontinence, female)     Wears glasses     Wears partial dentures     lower       Past Surgical History:   Procedure Laterality Date    CATARACT EXTRACTION  2015    Cataract Surgery    HYSTERECTOMY N/A 2024    SACROCOLPOPEXY N/A 2024    TUBAL LIGATION  10/18/2013    Tubal Ligation    URETHRAL SLING N/A 2024       Patient  has no history on file for sexual activity.    Family History   Problem Relation Name Age of Onset    Cervical cancer Mother      Prostate cancer Father      Diabetes type II Father      Aneurysm Sister          Ruptured Cerebral    Hyperlipidemia Brother      Diabetes type II Brother         Allergies   Allergen Reactions    Lisinopril Cough    Sulfa (Sulfonamide Antibiotics) Other, Rash and Unknown       Prior to Admission medications     Medication Sig Start Date End Date Taking? Authorizing Provider   acetaminophen (Tylenol) 500 mg tablet Take 2 tablets (1,000 mg) by mouth every 6 hours if needed for mild pain (1 - 3). 10/1/24   Andreina Gamez MD   amLODIPine (Norvasc) 5 mg tablet Take 1 tablet (5 mg) by mouth once daily. 4/15/24 4/10/25  Fermin Bunch V, DO   aspirin 81 mg EC tablet Take 1 tablet (81 mg) by mouth once daily at bedtime. 10/27/16   Historical Provider, MD   atorvastatin (Lipitor) 40 mg tablet Take 1 tablet (40 mg) by mouth once daily. 4/15/24 4/10/25  Fermin Bunch V, DO   calcium carbonate-vitamin D3 600 mg-20 mcg (800 unit) tablet Take 1 tablet by mouth 2 times a day.    Historical Provider, MD   estradiol (Estrace) 0.01 % (0.1 mg/gram) vaginal cream Insert 0.5 Applicatorfuls (2 g) into the vagina once daily.  Patient taking differently: Insert 0.5 Applicatorfuls (2 g) into the vagina if needed. 9/19/24 9/19/25  Rbuen Johnson MD   levothyroxine (Synthroid, Levoxyl) 100 mcg tablet Take 1 tablet (100 mcg) by mouth once daily. 4/16/24 4/16/25  Fermin Bunch V, DO   magnesium hydroxide (Milk of Magnesia) 400 mg/5 mL suspension Take 15 mL by mouth once daily as needed for constipation (if have not had a BM by postoperative day 3).  Patient not taking: Reported on 10/17/2024 9/16/24   Sameera Jaime PA-C   multivitamin with minerals tablet Take 2 tablets by mouth once daily.    Historical Provider, MD   nitrofurantoin, macrocrystal-monohydrate, (Macrobid) 100 mg capsule Take 1 capsule (100 mg) by mouth 2 times a day for 5 days. 10/16/24 10/21/24  Ruben Johnson MD   oxyCODONE (Roxicodone) 10 mg immediate release tablet Take 1 tablet (10 mg) by mouth every 6 hours if needed for severe pain (7 - 10).  Patient not taking: Reported on 10/17/2024 10/1/24   Andreina Gamez MD   polyethylene glycol (Glycolax, Miralax) 17 gram/dose powder DISSOLVE 17 GRAMS IN 8 OZ OF FLUID LIQUID DRINK DAILY AS DIRECTED  Patient not taking: Mix of powder  and drink. Reported on 9/30/2024 9/17/24   Sameera Jaime PA-C      Constitutional: Negative for fever, chills, or sweats   ENMT:glasses. Negative for nasal discharge, congestion, ear pain, mouth pain, throat pain   Respiratory: Negative for cough, wheezing, shortness of breath   Cardiac: Negative for chest pain, dyspnea on exertion, palpitations   Gastrointestinal: Negative for nausea, vomiting, diarrhea, constipation, abdominal pain  Genitourinary:see hpi  Musculoskeletal: + osteoarthritis. Negative for decreased ROM, pain, swelling, weakness  Neurological: Negative for dizziness, confusion, headache  Psychiatric: Negative for mood changes   Skin: Negative for itching, rash, ulcer    Hematologic/Lymph: Negative for bruising, easy bleeding  Allergic/Immunologic: Negative itching, sneezing, swelling     CONSTITUTIONAL - well nourished, well developed, looks like stated age  SKIN - normal skin color and pigmentation, no rash or lesions  HEAD - no trauma, normocephalic  EYES - glasses. pupils are equal and reactive to light, extraocular muscles are intact  ENT - uvula midline, normal tongue movement and throat normal, no exudate, nasal passage without discharge and patent  NECK - supple without rigidity, full ROM  CHEST - clear to auscultation, no wheezing, no crackles and no rales, good effort  CARDIAC - regular rate and regular rhythm, no skipped beats, no murmur  ABDOMEN - no organomegaly, soft, nontender, nondistended, normal bowel sounds, no guarding/rebound/rigidity. Teresa catheter to leg bag.    EXTREMITIES - no edema, no deformities  NEUROLOGICAL - normal gait, normal balance, normal motor; alert, oriented and no focal signs  PSYCHIATRIC - alert, pleasant and cordial, age-appropriate  IMMUNOLOGIC - no cervical lymphadenopathy     PAT AIRWAY:   Airway:     Mallampati::  II    Neck ROM::  Full   Missing teeth, lower removable partial   partials      Visit Vitals  /73   Pulse 61   Temp 36.3 °C (97.3 °F)  (Temporal)   Resp 16     EK2024: NSR HR 62 bpm    DASI Risk Score      Flowsheet Row Pre-Admission Testing from 10/17/2024 in [facility] Pre-Admission Testing from 2024 in [facility]   Can you take care of yourself (eat, dress, bathe, or use toilet)?  2.75 filed at 10/17/2024 1028 2.75 filed at 2024 1028   Can you walk indoors, such as around your house? 1.75 filed at 10/17/2024 1028 1.75 filed at 2024 1028   Can you walk a block or two on level ground?  2.75 filed at 10/17/2024 1028 2.75 filed at 2024 1028   Can you climb a flight of stairs or walk up a hill? 5.5 filed at 10/17/2024 1028 5.5 filed at 2024 1028   Can you run a short distance? 0 filed at 10/17/2024 1028 8 filed at 2024 1028   Can you do light work around the house like dusting or washing dishes? 2.7 filed at 10/17/2024 1028 2.7 filed at 2024 1028   Can you do moderate work around the house like vacuuming, sweeping floors or carrying groceries? 3.5 filed at 10/17/2024 1028 3.5 filed at 2024 1028   Can you do heavy work around the house like scrubbing floors or lifting and moving heavy furniture?  0 filed at 10/17/2024 1028 8 filed at 2024 1028   Can you do yard work like raking leaves, weeding or pushing a mower? 0 filed at 10/17/2024 1028 4.5 filed at 2024 1028   Can you have sexual relations? 0 filed at 10/17/2024 1028 5.25 filed at 2024 1028   Can you participate in moderate recreational activities like golf, bowling, dancing, doubles tennis or throwing a baseball or football? 0 filed at 10/17/2024 1028 0 filed at 2024 1028   Can you participate in strenous sports like swimming, singles tennis, football, basketball, or skiing? 0 filed at 10/17/2024 1028 0 filed at 2024 1028   DASI SCORE 18.95 filed at 10/17/2024 1028 44.7 filed at 2024 1028   METS Score (Will be calculated only when all the questions are answered) 5.1 filed at  10/17/2024 1028 8.2 filed at 08/30/2024 1028          Caprini DVT Assessment      Flowsheet Row Pre-Admission Testing from 10/17/2024 in [facility] ED to Hosp-Admission (Discharged) from 9/27/2024 in [facility] with Nagi Mars MD and Erick Garrison MD   DVT Score 3 filed at 10/17/2024 1027 4 filed at 09/27/2024 1421   Surgical Factors Major surgery planned, including arthroscopic and laproscopic (1-2 hours), Prior major surgery <1 month filed at 10/17/2024 1027 Prior major surgery <1 month filed at 09/27/2024 1421   BMI -- 30 or less filed at 09/27/2024 1421          Modified Frailty Index      Flowsheet Row Pre-Admission Testing from 10/17/2024 in [facility]   Non-independent functional status (problems with dressing, bathing, personal grooming, or cooking) 0 filed at 10/17/2024 1028   History of diabetes mellitus  0 filed at 10/17/2024 1028   History of COPD 0 filed at 10/17/2024 1028   History of CHF No filed at 10/17/2024 1028   History of MI 0 filed at 10/17/2024 1028   History of Percutaneous Coronary Intervention, Cardiac Surgery, or Angina No filed at 10/17/2024 1028   Hypertension requiring the use of medication  0.0909 filed at 10/17/2024 1028   Peripheral vascular disease 0 filed at 10/17/2024 1028   Impaired sensorium (cognitive impairement or loss, clouding, or delirium) 0 filed at 10/17/2024 1028   TIA or CVA withouy residual deficit 0 filed at 10/17/2024 1028   Cerebrovascular accident with deficit 0 filed at 10/17/2024 1028   Modified Frailty Index Calculator .0909 filed at 10/17/2024 1028          CHADS2 Stroke Risk  Current as of 3 hours ago        N/A 3 to 100%: High Risk   2 to < 3%: Medium Risk   0 to < 2%: Low Risk     Last Change: N/A          This score determines the patient's risk of having a stroke if the patient has atrial fibrillation.        This score is not applicable to this patient. Components are not calculated.           Revised Cardiac Risk Index      Flowsheet Row Pre-Admission Testing from 10/17/2024 in [facility] Pre-Admission Testing from 8/30/2024 in [facility]   High-Risk Surgery (Intraperitoneal, Intrathoracic,Suprainguinal vascular) 0 filed at 10/17/2024 1028 0 filed at 08/30/2024 1107   History of ischemic heart disease (History of MI, History of positive exercuse test, Current chest paint considered due to myocardial ischemia, Use of nitrate therapy, ECG with pathological Q Waves) 0 filed at 10/17/2024 1028 0 filed at 08/30/2024 1107   History of congestive heart failure (pulmonary edemia, bilateral rales or S3 gallop, Paroxysmal nocturnal dyspnea, CXR showing pulmonary vascular redistribution) 0 filed at 10/17/2024 1028 0 filed at 08/30/2024 1107   History of cerebrovascular disease (Prior TIA or stroke) 0 filed at 10/17/2024 1028 0 filed at 08/30/2024 1107   Pre-operative insulin treatment 0 filed at 10/17/2024 1028 0 filed at 08/30/2024 1107   Pre-operative creatinine>2 mg/dl 0 filed at 10/17/2024 1028 0 filed at 08/30/2024 1107   Revised Cardiac Risk Calculator 0 filed at 10/17/2024 1028 0 filed at 08/30/2024 1107          Apfel Simplified Score      Flowsheet Row Pre-Admission Testing from 10/17/2024 in [facility] Pre-Admission Testing from 8/30/2024 in [facility]   Smoking status 1 filed at 10/17/2024 1028 1 filed at 08/30/2024 1108   History of motion sickness or PONV  0 filed at 10/17/2024 1028 0 filed at 08/30/2024 1108   Use of postoperative opioids 0 filed at 10/17/2024 1028 1 filed at 08/30/2024 1108   Gender - Female 1=Yes filed at 10/17/2024 1028 1=Yes filed at 08/30/2024 1108   Apfel Simplified Score Calculator 2 filed at 10/17/2024 1028 3 filed at 08/30/2024 1108          Risk Analysis Index Results This Encounter         10/17/2024  1029             Do you live in a place other than your own home?: 0    When did you begin living in the place  you are currently residing?: Greater than one year ago    Any kidney failure, kidney not working well, or seeing a kidney doctor (nephrologist)? If yes, was this for kidney stones or another problem?: 0 No    Any history of chronic (long-term) congestive heart failure (CHF)?: 0 No    Any shortness of breath when resting?: 0 No    In the past five years, have you been diagnosed with or treated for cancer?: No    During the last 3 months has it become difficult for you to remember things or organize your thoughts?: 0 No    Have you lost weight of 10 pounds or more in the past 3 months without trying?: 0 No    Do you have any loss of appetitie?: 0 No    Getting Around (Mobility): 0 Can get around without help    Eatin Can plan and prepare own meals    Toiletin Can use toilet without any help    Personal Hygiene (Bathing, Hand Washing, Changing Clothes): 0 Can shower or bathe without any help    HU Cancer History: Patient does not indicate history of cancer    Total Risk Analysis Index Score Without Cancer: 22    Total Risk Analysis Index Score: 22          Stop Bang Score      Flowsheet Row Pre-Admission Testing from 10/17/2024 in [facility] Pre-Admission Testing from 2024 in [facility]   Do you snore loudly? 0 filed at 10/17/2024 1027 0 filed at 2024 1028   Do you often feel tired or fatigued after your sleep? 0 filed at 10/17/2024 1027 0 filed at 2024 1028   Has anyone ever observed you stop breathing in your sleep? 0 filed at 10/17/2024 1027 0 filed at 2024 1028   Do you have or are you being treated for high blood pressure? 1 filed at 10/17/2024 1027 1 filed at 2024 1028   Recent BMI (Calculated) 30.1 filed at 10/17/2024 1027 29.3 filed at 2024 1028   Is BMI greater than 35 kg/m2? 0=No filed at 10/17/2024 1027 0=No filed at 2024 1028   Age older than 50 years old? 1=Yes filed at 10/17/2024 1027 1=Yes filed at 2024 1028   Is your  neck circumference greater than 17 inches (Male) or 16 inches (Female)? 0 filed at 10/17/2024 1027 0 filed at 08/30/2024 1028   Gender - Male 0=No filed at 10/17/2024 1027 0=No filed at 08/30/2024 1028   STOP-BANG Total Score 2 filed at 10/17/2024 1027 2 filed at 08/30/2024 1028            Assessment and Plan:   70 yo female scheduled for open cystotomy and removal of eroded mess in bladder with Dr. Silva 10/28/2024.     HTN- controlled on Norvasc    Hypothyroidism- controlled on Synthroid    UTI- presently on Macrobid-has appt with PCP 10/21/2024 and will get urine culture done then,    Anesthesia:  Patient denies any anesthesia complications.        See risk scores as previously documented.

## 2024-10-17 NOTE — PROGRESS NOTES
Called patient to assess for any further needs they may have and to obtain an update on the patients status 14 days post discharge from the hospital. Patient did not follow up with their PCP within that time- upcoming appt is 10/21/24. Patient states she is doing well with no acute changes or concerns to report. Patient followed up with Urology 10/1/24, with patient scheduled for surgery 10/28/24 to have erosion of implanted genitourinary material removed. Patient was encouraged to call TCM with any needs or questions that may arise and to follow up with their PCP with any changes in status. Patient verbalized her understanding and denied any questions, concerns or needs from TCM. Patient requested to cancel 1 of 2 urology appts scheduled in November. Patient requested to cancel 11/13/24 appt as she is scheduled to see a different provider closer to home. Confirmed patient plans to keep upcoming PCP appt.   Next TCM outreach 30 days post discharge (2 weeks).

## 2024-10-17 NOTE — PREPROCEDURE INSTRUCTIONS
Medication List            Accurate as of October 17, 2024 10:46 AM. Always use your most recent med list.                acetaminophen 500 mg tablet  Commonly known as: Tylenol  Take 2 tablets (1,000 mg) by mouth every 6 hours if needed for mild pain (1 - 3).  Medication Adjustments for Surgery: Take/Use as prescribed     amLODIPine 5 mg tablet  Commonly known as: Norvasc  Take 1 tablet (5 mg) by mouth once daily.  Medication Adjustments for Surgery: Take/Use as prescribed     aspirin 81 mg EC tablet  Additional Medication Adjustments for Surgery: Other (Comment)  Notes to patient: Per provider recommendations     atorvastatin 40 mg tablet  Commonly known as: Lipitor  Take 1 tablet (40 mg) by mouth once daily.  Medication Adjustments for Surgery: Take/Use as prescribed     calcium carbonate-vitamin D3 600 mg-20 mcg (800 unit) tablet  Additional Medication Adjustments for Surgery: Take last dose 7 days before surgery     estradiol 0.01 % (0.1 mg/gram) vaginal cream  Commonly known as: Estrace  Insert 0.5 Applicatorfuls (2 g) into the vagina once daily.  Medication Adjustments for Surgery: Take last dose 2 days before surgery     levothyroxine 100 mcg tablet  Commonly known as: Synthroid, Levoxyl  Take 1 tablet (100 mcg) by mouth once daily.  Medication Adjustments for Surgery: Take/Use as prescribed     magnesium hydroxide 400 mg/5 mL suspension  Commonly known as: Milk of Magnesia  Take 15 mL by mouth once daily as needed for constipation (if have not had a BM by postoperative day 3).  Additional Medication Adjustments for Surgery: Other (Comment)  Notes to patient: Not taking     multivitamin with minerals tablet  Additional Medication Adjustments for Surgery: Take last dose 7 days before surgery     nitrofurantoin (macrocrystal-monohydrate) 100 mg capsule  Commonly known as: Macrobid  Take 1 capsule (100 mg) by mouth 2 times a day for 5 days.  Medication Adjustments for Surgery: Take/Use as prescribed      oxyCODONE 10 mg immediate release tablet  Commonly known as: Roxicodone  Take 1 tablet (10 mg) by mouth every 6 hours if needed for severe pain (7 - 10).  Additional Medication Adjustments for Surgery: Other (Comment)  Notes to patient: Not taking     polyethylene glycol 17 gram/dose powder  Commonly known as: Glycolax, Miralax  DISSOLVE 17 GRAMS IN 8 OZ OF FLUID LIQUID DRINK DAILY AS DIRECTED  Additional Medication Adjustments for Surgery: Other (Comment)  Notes to patient: Not taking                PRE-OPERATIVE INSTRUCTIONS    You will receive notification one business day prior to your procedure to confirm your arrival time. It is important that you answer your phone and/or check your messages during this time. If you do not hear from the surgery center by 5 pm. the day before your procedure, please call [phone].     Please enter the building through the Outpatient entrance and take the elevator off the lobby to the 2nd floor then check in at the Outpatient Surgery desk on the 2nd floor.    INSTRUCTIONS:  Talk to your surgeon for instructions if you should stop your aspirin, blood thinner, or diabetes medicines.  DO NOT take any multivitamins or over the counter supplements for 7-10 days before surgery.  If not being admitted, you must have an adult immediately available to drive you home after surgery. We also highly recommend you have someone stay with you for the entire day and night of your surgery.  For children having surgery, a parent or legal guardian must accompany them to the surgery center. If this is not possible, please call [phone] to make additional arrangements.  For adults who are unable to consent or make medical decisions for themselves, a legal guardian or Power of  must accompany them to the surgery center. If this is not possible, please call [phone] to make additional arrangements.  Wear comfortable, loose fitting clothing.  All jewelry and piercings must be  removed. If you are unable to remove an item or have a dermal piercing, please be sure to tell the nurse when you arrive for surgery.  Nail polish and make-up must be removed.  Avoid smoking or consuming alcohol for 24 hours before surgery.  To help prevent infection, please take a shower/bath and wash your hair the night before and/or morning of surgery (or follow other specific bathing instructions provided).    Preoperative Fasting Guidelines    Why must I stop eating and drinking near surgery time?  With sedation, food or liquid in your stomach can enter your lungs causing serious complications  Increases nausea and vomiting    When do I need to stop eating and drinking before my surgery?  Do not eat any solid food after midnight the night before your surgery/procedure unless otherwise instructed by your surgeon.   You may have up to 13.5 ounces of clear liquid until TWO hours before your instructed arrival time to the hospital.  This includes water, black tea/coffee, (no milk or cream) apple juice, and electrolyte drinks (Gatorade).   You may chew gum until TWO hours before your surgery/procedure      If you have any questions or concerns, please call Pre-Admission Testing at [phone].       Home Preoperative Antibacterial Shower with Chlorhexidine gluconate (CHG)     What is a home preoperative antibacterial shower?  This shower is a way of cleaning the skin with a germ killing solution before surgery. The solution contains chlorhexidine gluconate, commonly known as CHG. CHG is a skin cleanser with germ killing ability. Let your doctor know if you are allergic to chlorhexidine.    Why do I need to take a preoperative antibacterial shower?  Skin is not sterile. It is best to try to make your skin as free of germs as possible before surgery. Proper cleansing with a germ killing soap before surgery can lower the number of germs on your skin. This helps to reduce the risk of infection at the surgical site.  Following the instructions listed below will help you prepare your skin for surgery.    How do I use the solution?  Begin using your CHG soap the night before and again the morning of your procedure.   Do not shave the day before or day of surgery.  Remove all jewelry until after surgery. Take off rings and take out all body-piercing jewelry.  Wash your face and hair with normal soap and shampoo before you use the CHG soap.  Apply the CHG solution to a clean wet washcloth. Move away from the water to avoid premature rinsing of the CHG soap as you are applying. Firmly lather your entire body from the neck down. Do not use CHG on your face, eyes, ears, or genitals.   Pay special attention to the area where your incisions will be located.  Do not scrub your skin too hard.  It is important to allow the CHG soap to sit on your skin for 3-5 minutes.  Rinse the solution off your body completely. Do not wash with your normal soap after the CHG soap solution.  Pat yourself dry with a clean, soft towel.  Do not apply powders, lotions or deodorants as these might block how the CHG soap works.   Dress in clean clothing.  Be sure to sleep with clean, freshly laundered sheets.  Be aware that CHG can cause stains on fabric. Rinse your washcloth and other linens that have contact with CHG completely. Use only non-chlorine detergents to launder the items used.

## 2024-10-21 ENCOUNTER — APPOINTMENT (OUTPATIENT)
Dept: PRIMARY CARE | Facility: CLINIC | Age: 69
End: 2024-10-21
Payer: COMMERCIAL

## 2024-10-21 VITALS
DIASTOLIC BLOOD PRESSURE: 60 MMHG | HEIGHT: 60 IN | BODY MASS INDEX: 31.22 KG/M2 | HEART RATE: 61 BPM | OXYGEN SATURATION: 99 % | WEIGHT: 159 LBS | SYSTOLIC BLOOD PRESSURE: 152 MMHG

## 2024-10-21 DIAGNOSIS — R30.0 DYSURIA: ICD-10-CM

## 2024-10-21 DIAGNOSIS — Z00.00 ROUTINE GENERAL MEDICAL EXAMINATION AT A HEALTH CARE FACILITY: Primary | ICD-10-CM

## 2024-10-21 LAB
POC APPEARANCE, URINE: CLEAR
POC BILIRUBIN, URINE: NEGATIVE
POC BLOOD, URINE: ABNORMAL
POC COLOR, URINE: YELLOW
POC GLUCOSE, URINE: NEGATIVE MG/DL
POC KETONES, URINE: NEGATIVE MG/DL
POC LEUKOCYTES, URINE: ABNORMAL
POC NITRITE,URINE: NEGATIVE
POC PH, URINE: 7.5 PH
POC PROTEIN, URINE: NEGATIVE MG/DL
POC SPECIFIC GRAVITY, URINE: 1.01
POC UROBILINOGEN, URINE: 0.2 EU/DL

## 2024-10-21 PROCEDURE — 87086 URINE CULTURE/COLONY COUNT: CPT

## 2024-10-21 PROCEDURE — 3008F BODY MASS INDEX DOCD: CPT | Performed by: FAMILY MEDICINE

## 2024-10-21 PROCEDURE — 81003 URINALYSIS AUTO W/O SCOPE: CPT | Performed by: FAMILY MEDICINE

## 2024-10-21 PROCEDURE — 3078F DIAST BP <80 MM HG: CPT | Performed by: FAMILY MEDICINE

## 2024-10-21 PROCEDURE — 99397 PER PM REEVAL EST PAT 65+ YR: CPT | Performed by: FAMILY MEDICINE

## 2024-10-21 PROCEDURE — 1124F ACP DISCUSS-NO DSCNMKR DOCD: CPT | Performed by: FAMILY MEDICINE

## 2024-10-21 PROCEDURE — 3077F SYST BP >= 140 MM HG: CPT | Performed by: FAMILY MEDICINE

## 2024-10-21 PROCEDURE — 1159F MED LIST DOCD IN RCRD: CPT | Performed by: FAMILY MEDICINE

## 2024-10-21 PROCEDURE — 1111F DSCHRG MED/CURRENT MED MERGE: CPT | Performed by: FAMILY MEDICINE

## 2024-10-21 PROCEDURE — 87186 SC STD MICRODIL/AGAR DIL: CPT

## 2024-10-21 ASSESSMENT — ENCOUNTER SYMPTOMS
BLOOD IN STOOL: 0
UNEXPECTED WEIGHT CHANGE: 0
APPETITE CHANGE: 0
SHORTNESS OF BREATH: 0
TROUBLE SWALLOWING: 0
SEIZURES: 0
JOINT SWELLING: 0
CONFUSION: 0
FEVER: 0
DIARRHEA: 0
NERVOUS/ANXIOUS: 0
PALPITATIONS: 0
CONSTIPATION: 0
DIFFICULTY URINATING: 0
HEMATURIA: 0
COLOR CHANGE: 0

## 2024-10-21 NOTE — PROGRESS NOTES
Subjective   Patient ID: Verenice Sotelo is a 69 y.o. female who presents for Hypertension (Hypertension follow up /Wants urine checked /Having surgery 10/28 on her bladder ).  HPI  9/16 had prolapse repair, then had a eroding mesh into bladder. Now has cath and schedule surgery for 10/28.  Pending UA and culture    Htn:  Came off amlodipine after hospitalization. Restarted last week.   BP in -145/75-88, in PM: 112-120/60-70    Patient wants to wait on vaccines secondary to having surgery next week  Review of Systems   Constitutional:  Negative for appetite change, fever and unexpected weight change.   HENT:  Negative for congestion and trouble swallowing.    Eyes:  Negative for visual disturbance.   Respiratory:  Negative for shortness of breath.    Cardiovascular:  Negative for chest pain, palpitations and leg swelling.   Gastrointestinal:  Negative for blood in stool, constipation and diarrhea.   Genitourinary:  Negative for difficulty urinating and hematuria.   Musculoskeletal:  Negative for gait problem and joint swelling.   Skin:  Negative for color change.   Allergic/Immunologic: Negative for immunocompromised state.   Neurological:  Negative for seizures and syncope.   Psychiatric/Behavioral:  Negative for confusion and suicidal ideas. The patient is not nervous/anxious.        Objective   /60   Pulse 61   Ht 1.524 m (5')   Wt 72.1 kg (159 lb)   SpO2 99%   BMI 31.05 kg/m²     Physical Exam  Constitutional:       General: She is not in acute distress.     Appearance: Normal appearance. She is not ill-appearing.   HENT:      Head: Normocephalic and atraumatic.      Right Ear: Tympanic membrane normal.      Left Ear: Tympanic membrane normal.      Nose: Nose normal.      Mouth/Throat:      Mouth: Mucous membranes are moist.   Eyes:      Pupils: Pupils are equal, round, and reactive to light.   Cardiovascular:      Rate and Rhythm: Normal rate and regular rhythm.      Heart sounds: No murmur  heard.     No friction rub. No gallop.   Pulmonary:      Effort: Pulmonary effort is normal.      Breath sounds: Normal breath sounds.   Abdominal:      General: Abdomen is flat. There is no distension.      Palpations: Abdomen is soft.      Tenderness: There is no abdominal tenderness. There is no guarding.      Hernia: No hernia is present.   Musculoskeletal:         General: Normal range of motion.   Skin:     General: Skin is warm and dry.   Neurological:      General: No focal deficit present.      Mental Status: She is alert. Mental status is at baseline.      Cranial Nerves: No cranial nerve deficit.      Motor: No weakness.      Gait: Gait normal.   Psychiatric:         Mood and Affect: Mood normal.         Behavior: Behavior normal.         Thought Content: Thought content normal.         Judgment: Judgment normal.         Assessment/Plan   Problem List Items Addressed This Visit    None  Visit Diagnoses       Dysuria    -  Primary    Relevant Orders    POCT UA Automated manually resulted    Urine Culture          Assessment:  Hypothyroidism  levo 100mcg     Hyperlipidemia  Lipitor 40mg      Hypertension  Change losartan to amlodipine to help w/ raynauds  Off  hydrochlorothiazide     raynauds:  +lori, negative breakdown  Norvasc in the fall      Right carotid bruit:   -Negative carotid ultrasound     Osteopenia   -vit d/calcium      Pelvic prolapse:  -Repeat surgery scheduled for eroding mesh     Diverticulosis on colonoscopy      obesity:  Lost significant wt from WW     Health Maintenance Reminder:  -Medicare: 2025  -Preventative: 2025   -Blood Work: 4/25  -Hep C: complete  -Mammogram: 5/25  -Colonoscopy: 10/31  -Pap: >65y  -DEXA (FRAX): 5/25  -Shringix : completed   -Pneumovax: complete  -Prevnar: wants to do next time   -Flu: Yearly

## 2024-10-23 DIAGNOSIS — R30.0 DYSURIA: Primary | ICD-10-CM

## 2024-10-23 LAB — BACTERIA UR CULT: ABNORMAL

## 2024-10-23 RX ORDER — AMOXICILLIN AND CLAVULANATE POTASSIUM 875; 125 MG/1; MG/1
875 TABLET, FILM COATED ORAL 2 TIMES DAILY
Qty: 20 TABLET | Refills: 0 | Status: SHIPPED | OUTPATIENT
Start: 2024-10-23 | End: 2024-11-02

## 2024-10-27 ENCOUNTER — ANESTHESIA EVENT (OUTPATIENT)
Dept: OPERATING ROOM | Facility: HOSPITAL | Age: 69
End: 2024-10-27
Payer: COMMERCIAL

## 2024-10-28 ENCOUNTER — HOSPITAL ENCOUNTER (OUTPATIENT)
Facility: HOSPITAL | Age: 69
Setting detail: OUTPATIENT SURGERY
Discharge: HOME | End: 2024-10-28
Attending: UROLOGY | Admitting: UROLOGY
Payer: COMMERCIAL

## 2024-10-28 ENCOUNTER — ANESTHESIA (OUTPATIENT)
Dept: OPERATING ROOM | Facility: HOSPITAL | Age: 69
End: 2024-10-28
Payer: COMMERCIAL

## 2024-10-28 VITALS
BODY MASS INDEX: 30.21 KG/M2 | RESPIRATION RATE: 16 BRPM | HEIGHT: 61 IN | HEART RATE: 59 BPM | TEMPERATURE: 97.7 F | SYSTOLIC BLOOD PRESSURE: 107 MMHG | WEIGHT: 160 LBS | DIASTOLIC BLOOD PRESSURE: 62 MMHG | OXYGEN SATURATION: 99 %

## 2024-10-28 DIAGNOSIS — G89.18 POST-OP PAIN: ICD-10-CM

## 2024-10-28 DIAGNOSIS — T83.718A EROSION OF BLADDER SUSPENSION MESH, INITIAL ENCOUNTER (CMS-HCC): ICD-10-CM

## 2024-10-28 DIAGNOSIS — T83.718D EROSION OF BLADDER SUSPENSION MESH, SUBSEQUENT ENCOUNTER: Primary | ICD-10-CM

## 2024-10-28 DIAGNOSIS — T83.719A: Primary | ICD-10-CM

## 2024-10-28 DIAGNOSIS — K59.00 CONSTIPATION, UNSPECIFIED CONSTIPATION TYPE: ICD-10-CM

## 2024-10-28 PROCEDURE — 7100000001 HC RECOVERY ROOM TIME - INITIAL BASE CHARGE: Performed by: UROLOGY

## 2024-10-28 PROCEDURE — 3600000008 HC OR TIME - EACH INCREMENTAL 1 MINUTE - PROCEDURE LEVEL THREE: Performed by: UROLOGY

## 2024-10-28 PROCEDURE — 2500000004 HC RX 250 GENERAL PHARMACY W/ HCPCS (ALT 636 FOR OP/ED): Mod: JZ | Performed by: UROLOGY

## 2024-10-28 PROCEDURE — 57287 REVISE/REMOVE SLING REPAIR: CPT | Performed by: UROLOGY

## 2024-10-28 PROCEDURE — 7100000009 HC PHASE TWO TIME - INITIAL BASE CHARGE: Performed by: UROLOGY

## 2024-10-28 PROCEDURE — 3700000002 HC GENERAL ANESTHESIA TIME - EACH INCREMENTAL 1 MINUTE: Performed by: UROLOGY

## 2024-10-28 PROCEDURE — 2500000004 HC RX 250 GENERAL PHARMACY W/ HCPCS (ALT 636 FOR OP/ED): Performed by: ANESTHESIOLOGIST ASSISTANT

## 2024-10-28 PROCEDURE — 88302 TISSUE EXAM BY PATHOLOGIST: CPT | Performed by: PATHOLOGY

## 2024-10-28 PROCEDURE — 2500000005 HC RX 250 GENERAL PHARMACY W/O HCPCS: Performed by: ANESTHESIOLOGIST ASSISTANT

## 2024-10-28 PROCEDURE — 7100000002 HC RECOVERY ROOM TIME - EACH INCREMENTAL 1 MINUTE: Performed by: UROLOGY

## 2024-10-28 PROCEDURE — 2500000001 HC RX 250 WO HCPCS SELF ADMINISTERED DRUGS (ALT 637 FOR MEDICARE OP): Performed by: ANESTHESIOLOGY

## 2024-10-28 PROCEDURE — 3700000001 HC GENERAL ANESTHESIA TIME - INITIAL BASE CHARGE: Performed by: UROLOGY

## 2024-10-28 PROCEDURE — 2720000007 HC OR 272 NO HCPCS: Performed by: UROLOGY

## 2024-10-28 PROCEDURE — 3600000003 HC OR TIME - INITIAL BASE CHARGE - PROCEDURE LEVEL THREE: Performed by: UROLOGY

## 2024-10-28 PROCEDURE — 2500000005 HC RX 250 GENERAL PHARMACY W/O HCPCS: Performed by: ANESTHESIOLOGY

## 2024-10-28 PROCEDURE — 88302 TISSUE EXAM BY PATHOLOGIST: CPT | Mod: TC,STJLAB | Performed by: UROLOGY

## 2024-10-28 PROCEDURE — 7100000010 HC PHASE TWO TIME - EACH INCREMENTAL 1 MINUTE: Performed by: UROLOGY

## 2024-10-28 PROCEDURE — 2500000004 HC RX 250 GENERAL PHARMACY W/ HCPCS (ALT 636 FOR OP/ED): Performed by: ANESTHESIOLOGY

## 2024-10-28 PROCEDURE — 52000 CYSTOURETHROSCOPY: CPT | Performed by: UROLOGY

## 2024-10-28 RX ORDER — SODIUM CHLORIDE, SODIUM LACTATE, POTASSIUM CHLORIDE, CALCIUM CHLORIDE 600; 310; 30; 20 MG/100ML; MG/100ML; MG/100ML; MG/100ML
100 INJECTION, SOLUTION INTRAVENOUS CONTINUOUS
Status: DISCONTINUED | OUTPATIENT
Start: 2024-10-28 | End: 2024-10-28 | Stop reason: HOSPADM

## 2024-10-28 RX ORDER — CEFAZOLIN SODIUM 2 G/100ML
2 INJECTION, SOLUTION INTRAVENOUS ONCE
Status: COMPLETED | OUTPATIENT
Start: 2024-10-28 | End: 2024-10-28

## 2024-10-28 RX ORDER — ALBUTEROL SULFATE 0.83 MG/ML
2.5 SOLUTION RESPIRATORY (INHALATION) ONCE AS NEEDED
Status: DISCONTINUED | OUTPATIENT
Start: 2024-10-28 | End: 2024-10-28 | Stop reason: HOSPADM

## 2024-10-28 RX ORDER — POLYETHYLENE GLYCOL 3350 17 G/17G
17 POWDER, FOR SOLUTION ORAL DAILY
Qty: 510 G | Refills: 0 | Status: SHIPPED | OUTPATIENT
Start: 2024-10-28

## 2024-10-28 RX ORDER — NORETHINDRONE AND ETHINYL ESTRADIOL 0.5-0.035
KIT ORAL AS NEEDED
Status: DISCONTINUED | OUTPATIENT
Start: 2024-10-28 | End: 2024-10-28

## 2024-10-28 RX ORDER — ONDANSETRON HYDROCHLORIDE 2 MG/ML
INJECTION, SOLUTION INTRAVENOUS AS NEEDED
Status: DISCONTINUED | OUTPATIENT
Start: 2024-10-28 | End: 2024-10-28

## 2024-10-28 RX ORDER — FENTANYL CITRATE 50 UG/ML
INJECTION, SOLUTION INTRAMUSCULAR; INTRAVENOUS AS NEEDED
Status: DISCONTINUED | OUTPATIENT
Start: 2024-10-28 | End: 2024-10-28

## 2024-10-28 RX ORDER — OXYCODONE HYDROCHLORIDE 5 MG/1
5 TABLET ORAL EVERY 6 HOURS PRN
Qty: 10 TABLET | Refills: 0 | Status: SHIPPED | OUTPATIENT
Start: 2024-10-28 | End: 2024-10-31

## 2024-10-28 RX ORDER — ROCURONIUM BROMIDE 50 MG/5 ML
SYRINGE (ML) INTRAVENOUS AS NEEDED
Status: DISCONTINUED | OUTPATIENT
Start: 2024-10-28 | End: 2024-10-28

## 2024-10-28 RX ORDER — MIDAZOLAM HYDROCHLORIDE 1 MG/ML
1 INJECTION, SOLUTION INTRAMUSCULAR; INTRAVENOUS ONCE AS NEEDED
Status: DISCONTINUED | OUTPATIENT
Start: 2024-10-28 | End: 2024-10-28 | Stop reason: HOSPADM

## 2024-10-28 RX ORDER — HYDROMORPHONE HYDROCHLORIDE 1 MG/ML
1 INJECTION, SOLUTION INTRAMUSCULAR; INTRAVENOUS; SUBCUTANEOUS EVERY 5 MIN PRN
Status: DISCONTINUED | OUTPATIENT
Start: 2024-10-28 | End: 2024-10-28 | Stop reason: HOSPADM

## 2024-10-28 RX ORDER — FENTANYL CITRATE 50 UG/ML
12.5 INJECTION, SOLUTION INTRAMUSCULAR; INTRAVENOUS EVERY 5 MIN PRN
Status: DISCONTINUED | OUTPATIENT
Start: 2024-10-28 | End: 2024-10-28 | Stop reason: HOSPADM

## 2024-10-28 RX ORDER — HYDROMORPHONE HYDROCHLORIDE 1 MG/ML
INJECTION, SOLUTION INTRAMUSCULAR; INTRAVENOUS; SUBCUTANEOUS AS NEEDED
Status: DISCONTINUED | OUTPATIENT
Start: 2024-10-28 | End: 2024-10-28

## 2024-10-28 RX ORDER — HYDRALAZINE HYDROCHLORIDE 20 MG/ML
5 INJECTION INTRAMUSCULAR; INTRAVENOUS EVERY 30 MIN PRN
Status: DISCONTINUED | OUTPATIENT
Start: 2024-10-28 | End: 2024-10-28 | Stop reason: HOSPADM

## 2024-10-28 RX ORDER — ACETAMINOPHEN 500 MG
1000 TABLET ORAL EVERY 6 HOURS
Qty: 60 TABLET | Refills: 0 | Status: SHIPPED | OUTPATIENT
Start: 2024-10-28

## 2024-10-28 RX ORDER — ACETAMINOPHEN 325 MG/1
975 TABLET ORAL ONCE
Status: DISCONTINUED | OUTPATIENT
Start: 2024-10-28 | End: 2024-10-28 | Stop reason: HOSPADM

## 2024-10-28 RX ORDER — DIPHENHYDRAMINE HYDROCHLORIDE 50 MG/ML
12.5 INJECTION INTRAMUSCULAR; INTRAVENOUS ONCE AS NEEDED
Status: DISCONTINUED | OUTPATIENT
Start: 2024-10-28 | End: 2024-10-28 | Stop reason: HOSPADM

## 2024-10-28 RX ORDER — PHENYLEPHRINE HCL IN 0.9% NACL 1 MG/10 ML
SYRINGE (ML) INTRAVENOUS AS NEEDED
Status: DISCONTINUED | OUTPATIENT
Start: 2024-10-28 | End: 2024-10-28

## 2024-10-28 RX ORDER — MEPERIDINE HYDROCHLORIDE 50 MG/ML
12.5 INJECTION INTRAMUSCULAR; INTRAVENOUS; SUBCUTANEOUS EVERY 10 MIN PRN
Status: DISCONTINUED | OUTPATIENT
Start: 2024-10-28 | End: 2024-10-28 | Stop reason: HOSPADM

## 2024-10-28 RX ORDER — LIDOCAINE HYDROCHLORIDE 10 MG/ML
0.1 INJECTION, SOLUTION INFILTRATION; PERINEURAL ONCE
Status: DISCONTINUED | OUTPATIENT
Start: 2024-10-28 | End: 2024-10-28 | Stop reason: HOSPADM

## 2024-10-28 RX ORDER — PROPOFOL 10 MG/ML
INJECTION, EMULSION INTRAVENOUS AS NEEDED
Status: DISCONTINUED | OUTPATIENT
Start: 2024-10-28 | End: 2024-10-28

## 2024-10-28 RX ORDER — ONDANSETRON HYDROCHLORIDE 2 MG/ML
4 INJECTION, SOLUTION INTRAVENOUS ONCE AS NEEDED
Status: COMPLETED | OUTPATIENT
Start: 2024-10-28 | End: 2024-10-28

## 2024-10-28 RX ORDER — NEOSTIGMINE METHYLSULFATE 1 MG/ML
INJECTION INTRAVENOUS AS NEEDED
Status: DISCONTINUED | OUTPATIENT
Start: 2024-10-28 | End: 2024-10-28

## 2024-10-28 RX ORDER — OXYCODONE HYDROCHLORIDE 10 MG/1
10 TABLET ORAL EVERY 4 HOURS PRN
Status: DISCONTINUED | OUTPATIENT
Start: 2024-10-28 | End: 2024-10-28 | Stop reason: HOSPADM

## 2024-10-28 RX ORDER — LIDOCAINE HYDROCHLORIDE 20 MG/ML
INJECTION, SOLUTION EPIDURAL; INFILTRATION; INTRACAUDAL; PERINEURAL AS NEEDED
Status: DISCONTINUED | OUTPATIENT
Start: 2024-10-28 | End: 2024-10-28

## 2024-10-28 RX ORDER — PHENYLEPHRINE 10 MG/250 ML(40 MCG/ML)IN 0.9 % SOD.CHLORIDE INTRAVENOUS
CONTINUOUS PRN
Status: DISCONTINUED | OUTPATIENT
Start: 2024-10-28 | End: 2024-10-28

## 2024-10-28 RX ORDER — IBUPROFEN 600 MG/1
600 TABLET ORAL EVERY 6 HOURS
Qty: 30 TABLET | Refills: 0 | Status: SHIPPED | OUTPATIENT
Start: 2024-10-28

## 2024-10-28 RX ORDER — GLYCOPYRROLATE 0.2 MG/ML
INJECTION INTRAMUSCULAR; INTRAVENOUS AS NEEDED
Status: DISCONTINUED | OUTPATIENT
Start: 2024-10-28 | End: 2024-10-28

## 2024-10-28 RX ORDER — MIDAZOLAM HYDROCHLORIDE 1 MG/ML
INJECTION, SOLUTION INTRAMUSCULAR; INTRAVENOUS AS NEEDED
Status: DISCONTINUED | OUTPATIENT
Start: 2024-10-28 | End: 2024-10-28

## 2024-10-28 ASSESSMENT — PAIN SCALES - GENERAL
PAINLEVEL_OUTOF10: 6
PAINLEVEL_OUTOF10: 3
PAINLEVEL_OUTOF10: 7
PAINLEVEL_OUTOF10: 4
PAINLEVEL_OUTOF10: 3
PAINLEVEL_OUTOF10: 5 - MODERATE PAIN

## 2024-10-28 ASSESSMENT — PAIN - FUNCTIONAL ASSESSMENT
PAIN_FUNCTIONAL_ASSESSMENT: 0-10
PAIN_FUNCTIONAL_ASSESSMENT: UNABLE TO SELF-REPORT
PAIN_FUNCTIONAL_ASSESSMENT: 0-10
PAIN_FUNCTIONAL_ASSESSMENT: 0-10

## 2024-10-28 ASSESSMENT — PAIN DESCRIPTION - DESCRIPTORS
DESCRIPTORS: ACHING
DESCRIPTORS: DISCOMFORT

## 2024-10-31 ENCOUNTER — APPOINTMENT (OUTPATIENT)
Dept: UROLOGY | Facility: CLINIC | Age: 69
End: 2024-10-31
Payer: COMMERCIAL

## 2024-11-01 ENCOUNTER — PATIENT OUTREACH (OUTPATIENT)
Dept: PRIMARY CARE | Facility: CLINIC | Age: 69
End: 2024-11-01
Payer: COMMERCIAL

## 2024-11-01 DIAGNOSIS — Z09 HOSPITAL DISCHARGE FOLLOW-UP: ICD-10-CM

## 2024-11-07 ENCOUNTER — HOSPITAL ENCOUNTER (OUTPATIENT)
Dept: RADIOLOGY | Facility: HOSPITAL | Age: 69
Discharge: HOME | End: 2024-11-07
Payer: COMMERCIAL

## 2024-11-07 DIAGNOSIS — T83.719A: ICD-10-CM

## 2024-11-07 PROCEDURE — 74430 CONTRAST X-RAY BLADDER: CPT

## 2024-11-07 PROCEDURE — 2550000001 HC RX 255 CONTRASTS

## 2024-11-09 LAB
LABORATORY COMMENT REPORT: NORMAL
PATH REPORT.FINAL DX SPEC: NORMAL
PATH REPORT.GROSS SPEC: NORMAL
PATH REPORT.RELEVANT HX SPEC: NORMAL
PATH REPORT.TOTAL CANCER: NORMAL

## 2024-11-11 ENCOUNTER — APPOINTMENT (OUTPATIENT)
Dept: UROLOGY | Facility: CLINIC | Age: 69
End: 2024-11-11
Payer: COMMERCIAL

## 2024-11-11 DIAGNOSIS — Z09 POSTOP CHECK: Primary | ICD-10-CM

## 2024-11-11 PROCEDURE — 99024 POSTOP FOLLOW-UP VISIT: CPT | Performed by: STUDENT IN AN ORGANIZED HEALTH CARE EDUCATION/TRAINING PROGRAM

## 2024-11-11 NOTE — PROGRESS NOTES
HISTORY OF PRESENT ILLNESS:  Verenice Sotelo is a 69 y.o. female who presents today for a follow up visit. She is status post cystoscopy rigid on 9/27/24.   She is doing okay since being out of the hospital. She does have some urgency, but she reminds herself to go every 2 to 3 hours. She is drinking more water as well. She states she is doing that to help her bowels because she is not interested in taking medication for her bowels.          Past Medical History  She has a past medical history of Acute maxillary sinusitis, unspecified (10/26/2016), Arthritis, Diverticulosis, Encounter for immunization (01/09/2020), Encounter for immunization (09/19/2016), Encounter for immunization (09/19/2016), Encounter for screening for other viral diseases (12/01/2017), Female genital prolapse, Hyperlipidemia, Hypertension, Hypothyroidism, Midline cystocele, OAB (overactive bladder), Osteopenia, Personal history of other specified conditions (04/23/2015), Raynaud's disease, Right carotid bruit, FRANCIS (stress urinary incontinence, female), Wears glasses, and Wears partial dentures.    Surgical History  She has a past surgical history that includes Tubal ligation (10/18/2013); Cataract extraction (06/11/2015); Hysterectomy (N/A, 09/16/2024); Urethral sling (N/A, 09/16/2024); and Sacrocolpopexy (N/A, 09/16/2024).     Social History  She reports that she has never smoked. She has never used smokeless tobacco. She reports that she does not drink alcohol and does not use drugs.    Family History  Family History   Problem Relation Name Age of Onset    Cervical cancer Mother      Prostate cancer Father      Diabetes type II Father      Aneurysm Sister          Ruptured Cerebral    Hyperlipidemia Brother      Diabetes type II Brother          Allergies  Lisinopril and Sulfa (sulfonamide antibiotics)      A comprehensive 10+ review of systems was negative except for: see hpi                          PHYSICAL EXAMINATION:  BP Readings  "from Last 3 Encounters:   10/28/24 107/62   10/21/24 152/60   10/17/24 174/73      Wt Readings from Last 3 Encounters:   10/28/24 72.6 kg (160 lb)   10/21/24 72.1 kg (159 lb)   10/17/24 72.3 kg (159 lb 6.3 oz)      BMI: Estimated body mass index is 30.23 kg/m² as calculated from the following:    Height as of 10/28/24: 1.549 m (5' 1\").    Weight as of 10/28/24: 72.6 kg (160 lb).  BSA: Estimated body surface area is 1.77 meters squared as calculated from the following:    Height as of 10/28/24: 1.549 m (5' 1\").    Weight as of 10/28/24: 72.6 kg (160 lb).  HEENT: Normocephalic, atraumatic, PER EOMI, nonicteric, trachea normal, thyroid normal, oropharynx normal.  CARDIAC: regular rate & rhythm, S1 & S2 normal.  No heaves, thrills, gallops or murmurs.  LUNGS: Clear to auscultation, no spinal or CV tenderness.  EXTREMITIES: No evidence of cyanosis, clubbing or edema.       Stage 0 POP  Pfannenstiel incision c/d/i        Assessment:  Verenice Sotelo is a 69 y.o. who presents with stage II uterovaginal prolapse and mixed incontinence     POP:  -status post laparoscopic supracervical hysterectomy, sacrocolpopexy, sling 9/16/2024  -c/b bladder sling erosion, removed via retropubic approach by Dr. Silva 10/28       MADIE  -urgency persists  -no FRANCIS  -Recommended no more than 8 ounces of water an hour   -consider pharmacotherapy if OAB persists    Follow up in 3 months       All questions and concerns were answered and addressed.  The patient expressed understanding and agrees with the plan.     Ruben Johnson MD    Scribe Attestation  By signing my name below, I, Mayuri Plata   attest that this documentation has been prepared under the direction and in the presence of Ruben Johnson MD.  "

## 2024-11-13 ENCOUNTER — APPOINTMENT (OUTPATIENT)
Dept: UROLOGY | Facility: CLINIC | Age: 69
End: 2024-11-13
Payer: COMMERCIAL

## 2024-11-18 ENCOUNTER — OFFICE VISIT (OUTPATIENT)
Dept: PRIMARY CARE | Facility: CLINIC | Age: 69
End: 2024-11-18
Payer: COMMERCIAL

## 2024-11-18 ENCOUNTER — HOSPITAL ENCOUNTER (OUTPATIENT)
Dept: RADIOLOGY | Facility: HOSPITAL | Age: 69
Discharge: HOME | End: 2024-11-18
Payer: COMMERCIAL

## 2024-11-18 VITALS
SYSTOLIC BLOOD PRESSURE: 132 MMHG | WEIGHT: 159 LBS | BODY MASS INDEX: 30.02 KG/M2 | DIASTOLIC BLOOD PRESSURE: 64 MMHG | HEART RATE: 77 BPM | TEMPERATURE: 98.4 F | HEIGHT: 61 IN | OXYGEN SATURATION: 99 %

## 2024-11-18 DIAGNOSIS — R35.0 URINARY FREQUENCY: ICD-10-CM

## 2024-11-18 DIAGNOSIS — R35.0 URINARY FREQUENCY: Primary | ICD-10-CM

## 2024-11-18 DIAGNOSIS — M79.10 MYALGIA: ICD-10-CM

## 2024-11-18 LAB
ATRIAL RATE: 62 BPM
P AXIS: 48 DEGREES
P OFFSET: 167 MS
P ONSET: 134 MS
POC APPEARANCE, URINE: CLEAR
POC BILIRUBIN, URINE: NEGATIVE
POC BLOOD, URINE: ABNORMAL
POC COLOR, URINE: YELLOW
POC GLUCOSE, URINE: NEGATIVE MG/DL
POC KETONES, URINE: NEGATIVE MG/DL
POC LEUKOCYTES, URINE: ABNORMAL
POC NITRITE,URINE: NEGATIVE
POC PH, URINE: 6 PH
POC PROTEIN, URINE: NEGATIVE MG/DL
POC SPECIFIC GRAVITY, URINE: <=1.005
POC UROBILINOGEN, URINE: 0.2 EU/DL
PR INTERVAL: 176 MS
Q ONSET: 222 MS
QRS COUNT: 10 BEATS
QRS DURATION: 74 MS
QT INTERVAL: 410 MS
QTC CALCULATION(BAZETT): 416 MS
QTC FREDERICIA: 414 MS
R AXIS: 36 DEGREES
T AXIS: 61 DEGREES
T OFFSET: 427 MS
VENTRICULAR RATE: 62 BPM

## 2024-11-18 PROCEDURE — 3078F DIAST BP <80 MM HG: CPT

## 2024-11-18 PROCEDURE — 99214 OFFICE O/P EST MOD 30 MIN: CPT

## 2024-11-18 PROCEDURE — G2211 COMPLEX E/M VISIT ADD ON: HCPCS

## 2024-11-18 PROCEDURE — 1159F MED LIST DOCD IN RCRD: CPT

## 2024-11-18 PROCEDURE — 87186 SC STD MICRODIL/AGAR DIL: CPT

## 2024-11-18 PROCEDURE — 1036F TOBACCO NON-USER: CPT

## 2024-11-18 PROCEDURE — 2550000001 HC RX 255 CONTRASTS

## 2024-11-18 PROCEDURE — 74176 CT ABD & PELVIS W/O CONTRAST: CPT

## 2024-11-18 PROCEDURE — 87086 URINE CULTURE/COLONY COUNT: CPT

## 2024-11-18 PROCEDURE — 74176 CT ABD & PELVIS W/O CONTRAST: CPT | Performed by: RADIOLOGY

## 2024-11-18 PROCEDURE — 3008F BODY MASS INDEX DOCD: CPT

## 2024-11-18 PROCEDURE — 3075F SYST BP GE 130 - 139MM HG: CPT

## 2024-11-18 PROCEDURE — A9698 NON-RAD CONTRAST MATERIALNOC: HCPCS

## 2024-11-18 PROCEDURE — 1160F RVW MEDS BY RX/DR IN RCRD: CPT

## 2024-11-18 PROCEDURE — 81003 URINALYSIS AUTO W/O SCOPE: CPT

## 2024-11-18 RX ORDER — MELOXICAM 7.5 MG/1
7.5 TABLET ORAL DAILY
COMMUNITY

## 2024-11-18 ASSESSMENT — PATIENT HEALTH QUESTIONNAIRE - PHQ9
1. LITTLE INTEREST OR PLEASURE IN DOING THINGS: NOT AT ALL
2. FEELING DOWN, DEPRESSED OR HOPELESS: NOT AT ALL
SUM OF ALL RESPONSES TO PHQ9 QUESTIONS 1 AND 2: 0

## 2024-11-18 NOTE — PROGRESS NOTES
"Subjective   Patient ID: Verenice Sotelo is a 69 y.o. female who presents for decreased appetite (Body aches and chills since wed.).  HPI  - She is s/p cystoscopy rigidon 9/27/24. Saw Dr Johnson a week ago and he said incision looked good and \"cleared\" her  -  wednesday started having chills , taking a lot of tylenol for body aches   - waking up soaked in sweat but not registering a fever on the thermometer  - no appetite, forces herself to eat to take her medication in the AM   - not drinking coffee which is highly unusual for her   - no vomiting / diarrhea  - feels pain suprapubic when going to the bathroom-- hard for her to tell if it is from the bladder, colon, constipation, incision pain etc   - woke up with bad back ache the last few days -- low back pain, midline. Tylenol helped. This is new for her  - no cough, CP, SOB, congestion, sore throat, ear pain   - also taking meloxicam  - covid neg     Current Outpatient Medications:     acetaminophen (Tylenol) 500 mg tablet, Take 2 tablets (1,000 mg) by mouth every 6 hours if needed for mild pain (1 - 3)., Disp: 20 tablet, Rfl: 0    amLODIPine (Norvasc) 5 mg tablet, Take 1 tablet (5 mg) by mouth once daily., Disp: 90 tablet, Rfl: 3    aspirin 81 mg EC tablet, Take 1 tablet (81 mg) by mouth once daily at bedtime., Disp: , Rfl:     atorvastatin (Lipitor) 40 mg tablet, Take 1 tablet (40 mg) by mouth once daily., Disp: 90 tablet, Rfl: 3    levothyroxine (Synthroid, Levoxyl) 100 mcg tablet, Take 1 tablet (100 mcg) by mouth once daily., Disp: 90 tablet, Rfl: 3    magnesium hydroxide (Milk of Magnesia) 400 mg/5 mL suspension, Take 15 mL by mouth once daily as needed for constipation (if have not had a BM by postoperative day 3)., Disp: 360 mL, Rfl: 0    meloxicam (Mobic) 7.5 mg tablet, Take 1 tablet (7.5 mg) by mouth once daily., Disp: , Rfl:     polyethylene glycol (Glycolax, Miralax) 17 gram/dose powder, DISSOLVE 17 GRAMS IN 8 OZ OF FLUID LIQUID DRINK DAILY AS " "DIRECTED, Disp: 510 g, Rfl: 0   Past Surgical History:   Procedure Laterality Date    CATARACT EXTRACTION  06/11/2015    Cataract Surgery    HYSTERECTOMY N/A 09/16/2024    SACROCOLPOPEXY N/A 09/16/2024    TUBAL LIGATION  10/18/2013    Tubal Ligation    URETHRAL SLING N/A 09/16/2024      Past Medical History:   Diagnosis Date    Acute maxillary sinusitis, unspecified 10/26/2016    Acute maxillary sinusitis    Arthritis     Diverticulosis     Encounter for immunization 01/09/2020    Need for shingles vaccine    Encounter for immunization 09/19/2016    Need for shingles vaccine    Encounter for immunization 09/19/2016    Needs flu shot    Encounter for screening for other viral diseases 12/01/2017    Need for hepatitis C screening test    Female genital prolapse     Hyperlipidemia     Hypertension     Hypothyroidism     Midline cystocele     OAB (overactive bladder)     Osteopenia     Personal history of other specified conditions 04/23/2015    History of diarrhea    Raynaud's disease     Right carotid bruit     FRANCIS (stress urinary incontinence, female)     Wears glasses     Wears partial dentures     lower     Social History     Tobacco Use    Smoking status: Never    Smokeless tobacco: Never   Substance Use Topics    Alcohol use: Never    Drug use: Never      Family History   Problem Relation Name Age of Onset    Cervical cancer Mother      Prostate cancer Father      Diabetes type II Father      Aneurysm Sister          Ruptured Cerebral    Hyperlipidemia Brother      Diabetes type II Brother        Review of Systems  10 point ROS negative except as otherwise noted in the HPI.      Objective   /64   Pulse 77   Temp 36.9 °C (98.4 °F)   Ht 1.549 m (5' 1\")   Wt 72.1 kg (159 lb)   SpO2 99%   BMI 30.04 kg/m²    Physical Exam  Vitals reviewed.   Constitutional:       General: She is not in acute distress.     Appearance: Normal appearance. She is ill-appearing.   HENT:      Head: Normocephalic and atraumatic. "      Right Ear: Tympanic membrane, ear canal and external ear normal.      Left Ear: Tympanic membrane, ear canal and external ear normal.      Nose: Nose normal.      Mouth/Throat:      Mouth: Mucous membranes are moist.      Pharynx: Oropharynx is clear. No oropharyngeal exudate or posterior oropharyngeal erythema.   Eyes:      Extraocular Movements: Extraocular movements intact.      Conjunctiva/sclera: Conjunctivae normal.      Pupils: Pupils are equal, round, and reactive to light.   Cardiovascular:      Rate and Rhythm: Normal rate and regular rhythm.      Pulses: Normal pulses.      Heart sounds: Normal heart sounds.   Pulmonary:      Effort: Pulmonary effort is normal.      Breath sounds: Normal breath sounds.   Abdominal:      General: Abdomen is flat. Bowel sounds are normal. There is no distension.      Palpations: Abdomen is soft.      Tenderness: There is abdominal tenderness. There is no right CVA tenderness, left CVA tenderness, guarding or rebound.      Comments: Suprapubic tenderness  Well healing transverse incision suprapubic   Musculoskeletal:         General: Normal range of motion.      Cervical back: Normal range of motion and neck supple.   Lymphadenopathy:      Cervical: No cervical adenopathy.   Skin:     General: Skin is warm and dry.      Findings: No rash.   Neurological:      General: No focal deficit present.      Mental Status: She is alert and oriented to person, place, and time.   Psychiatric:      Comments: tearful           Assessment/Plan   Problem List Items Addressed This Visit    None  Visit Diagnoses       Urinary frequency    -  Primary  - Pt with night sweats, chills, myalgias for 6 days. Also with suprapubic TTP. She is s/p rigid cystoscopy, incision healing well.   - no obvious source on exam  - UA w small leuks, trace blood   - urine cx pending   - c/f abscess, infection such as pyelo   - D/w DS, CT AP non con ordered stat , will f/u on results     Relevant Orders     POCT UA Automated manually resulted (Completed)    Urine Culture    CT abdomen pelvis wo IV contrast    Myalgia        Relevant Orders    CT abdomen pelvis wo IV contrast            Discussed at visit any disease processes that were of concern as well as the risks, benefits and instructions on any new medication provided. Patient (and/or caretaker of patient if present) stated all questions were answered, and they voiced understanding of instructions.     Caprice Taylor PA-C

## 2024-11-19 DIAGNOSIS — N39.0 URINARY TRACT INFECTION WITHOUT HEMATURIA, SITE UNSPECIFIED: ICD-10-CM

## 2024-11-19 DIAGNOSIS — R35.0 URINARY FREQUENCY: Primary | ICD-10-CM

## 2024-11-19 RX ORDER — DOXYCYCLINE 100 MG/1
100 CAPSULE ORAL 2 TIMES DAILY
Qty: 28 CAPSULE | Refills: 0 | Status: SHIPPED | OUTPATIENT
Start: 2024-11-19 | End: 2024-12-03

## 2024-11-20 LAB — BACTERIA UR CULT: ABNORMAL

## 2024-11-21 ENCOUNTER — OFFICE VISIT (OUTPATIENT)
Dept: UROLOGY | Facility: CLINIC | Age: 69
End: 2024-11-21
Payer: COMMERCIAL

## 2024-11-21 DIAGNOSIS — Z09 POSTOP CHECK: ICD-10-CM

## 2024-11-21 PROCEDURE — 87070 CULTURE OTHR SPECIMN AEROBIC: CPT

## 2024-11-21 PROCEDURE — 99024 POSTOP FOLLOW-UP VISIT: CPT | Performed by: STUDENT IN AN ORGANIZED HEALTH CARE EDUCATION/TRAINING PROGRAM

## 2024-11-21 PROCEDURE — 87075 CULTR BACTERIA EXCEPT BLOOD: CPT

## 2024-11-21 PROCEDURE — 87205 SMEAR GRAM STAIN: CPT

## 2024-11-21 RX ORDER — LIDOCAINE HYDROCHLORIDE 10 MG/ML
10 INJECTION, SOLUTION INFILTRATION; PERINEURAL ONCE
Status: COMPLETED | OUTPATIENT
Start: 2024-11-21 | End: 2024-11-21

## 2024-11-21 NOTE — PROGRESS NOTES
HISTORY OF PRESENT ILLNESS:  Verenice Sotelo is a 69 y.o. female who presents today for a post op visit. She is doing well. She states her incision feels fine. CT showed a seroma, she is on abx for a UTI and feels ok           Past Medical History  She has a past medical history of Acute maxillary sinusitis, unspecified (10/26/2016), Arthritis, Diverticulosis, Encounter for immunization (01/09/2020), Encounter for immunization (09/19/2016), Encounter for immunization (09/19/2016), Encounter for screening for other viral diseases (12/01/2017), Female genital prolapse, Hyperlipidemia, Hypertension, Hypothyroidism, Midline cystocele, OAB (overactive bladder), Osteopenia, Personal history of other specified conditions (04/23/2015), Raynaud's disease, Right carotid bruit, FRANCIS (stress urinary incontinence, female), Wears glasses, and Wears partial dentures.    Surgical History  She has a past surgical history that includes Tubal ligation (10/18/2013); Cataract extraction (06/11/2015); Hysterectomy (N/A, 09/16/2024); Urethral sling (N/A, 09/16/2024); and Sacrocolpopexy (N/A, 09/16/2024).     Social History  She reports that she has never smoked. She has never used smokeless tobacco. She reports that she does not drink alcohol and does not use drugs.    Family History  Family History   Problem Relation Name Age of Onset    Cervical cancer Mother      Prostate cancer Father      Diabetes type II Father      Aneurysm Sister          Ruptured Cerebral    Hyperlipidemia Brother      Diabetes type II Brother          Allergies  Lisinopril and Sulfa (sulfonamide antibiotics)      A comprehensive 10+ review of systems was negative except for: see hpi                          PHYSICAL EXAMINATION:  BP Readings from Last 3 Encounters:   11/18/24 132/64   10/28/24 107/62   10/21/24 152/60      Wt Readings from Last 3 Encounters:   11/18/24 72.1 kg (159 lb)   10/28/24 72.6 kg (160 lb)   10/21/24 72.1 kg (159 lb)      BMI:  "Estimated body mass index is 30.04 kg/m² as calculated from the following:    Height as of 11/18/24: 1.549 m (5' 1\").    Weight as of 11/18/24: 72.1 kg (159 lb).  BSA: Estimated body surface area is 1.76 meters squared as calculated from the following:    Height as of 11/18/24: 1.549 m (5' 1\").    Weight as of 11/18/24: 72.1 kg (159 lb).  HEENT: Normocephalic, atraumatic, PER EOMI, nonicteric, trachea normal, thyroid normal, oropharynx normal.  CARDIAC: regular rate & rhythm, S1 & S2 normal.  No heaves, thrills, gallops or murmurs.  LUNGS: Clear to auscultation, no spinal or CV tenderness.  EXTREMITIES: No evidence of cyanosis, clubbing or edema.       Small amount of serosanguinous fluid aspirated from just underneath incision, using a 20 Ga spinal needle after the area was cleaned with betadine and numbed with 1% lidocaine with epi    Sent for pathology             Assessment:  Verenice Sotelo is a 69 y.o. who presents with stage II uterovaginal prolapse and mixed incontinence     POP:  -status post laparoscopic supracervical hysterectomy, sacrocolpopexy, sling 9/16/2024  -c/b bladder sling erosion, removed via retropubic approach by Dr. Silva 10/28        MADIE  -urgency persists  -no FRANCIS  -Recommended no more than 8 ounces of water an hour   -consider pharmacotherapy if OAB persists      Follow up in 3 months        All questions and concerns were answered and addressed.  The patient expressed understanding and agrees with the plan.     Ruben Johnson MD    Scribe Attestation  By signing my name below, I, Yana Jeevan, Scribe   attest that this documentation has been prepared under the direction and in the presence of Ruben Johnson MD.  "

## 2024-11-24 LAB
BACTERIA SPEC CULT: NORMAL
GRAM STN SPEC: NORMAL
GRAM STN SPEC: NORMAL

## 2025-01-09 ENCOUNTER — PATIENT OUTREACH (OUTPATIENT)
Dept: PRIMARY CARE | Facility: CLINIC | Age: 70
End: 2025-01-09
Payer: COMMERCIAL

## 2025-01-09 DIAGNOSIS — Z09 HOSPITAL DISCHARGE FOLLOW-UP: ICD-10-CM

## 2025-01-09 NOTE — PROGRESS NOTES
Meg communication for 90 day TCM post discharge outreach.   Patient has met target of no readmission for 90 days post hospital discharge and is graduated from Transitional Care Management program at this time.

## 2025-01-20 RX ORDER — MELOXICAM 7.5 MG/1
7.5 TABLET ORAL DAILY
OUTPATIENT
Start: 2025-01-20

## 2025-01-22 DIAGNOSIS — M79.10 MYALGIA: Primary | ICD-10-CM

## 2025-01-22 DIAGNOSIS — M17.11 ARTHRITIS OF KNEE, RIGHT: ICD-10-CM

## 2025-01-22 RX ORDER — MELOXICAM 7.5 MG/1
7.5 TABLET ORAL DAILY
Qty: 90 TABLET | Refills: 1 | Status: SHIPPED | OUTPATIENT
Start: 2025-01-22

## 2025-02-06 ENCOUNTER — APPOINTMENT (OUTPATIENT)
Dept: UROLOGY | Facility: CLINIC | Age: 70
End: 2025-02-06
Payer: COMMERCIAL

## 2025-02-06 DIAGNOSIS — N39.3 SUI (STRESS URINARY INCONTINENCE, FEMALE): Primary | ICD-10-CM

## 2025-02-06 DIAGNOSIS — N32.81 OAB (OVERACTIVE BLADDER): ICD-10-CM

## 2025-02-06 DIAGNOSIS — N81.9 FEMALE GENITAL PROLAPSE, UNSPECIFIED TYPE: ICD-10-CM

## 2025-02-06 PROCEDURE — 99214 OFFICE O/P EST MOD 30 MIN: CPT | Performed by: STUDENT IN AN ORGANIZED HEALTH CARE EDUCATION/TRAINING PROGRAM

## 2025-02-06 PROCEDURE — G2211 COMPLEX E/M VISIT ADD ON: HCPCS | Performed by: STUDENT IN AN ORGANIZED HEALTH CARE EDUCATION/TRAINING PROGRAM

## 2025-02-06 RX ORDER — VIBEGRON 75 MG/1
75 TABLET, FILM COATED ORAL DAILY
Qty: 84 TABLET | Refills: 0 | COMMUNITY
Start: 2025-02-06 | End: 2025-05-01

## 2025-02-06 NOTE — PROGRESS NOTES
HISTORY OF PRESENT ILLNESS:  Verenice Sotelo is a 69 y.o. female who presents today for a follow up visit. She reports that she does have some urgency. She states that it has been worsening. She does feel that stress incontinence is starting to develop as well. She also wakes up at night time to void.           Past Medical History  She has a past medical history of Acute maxillary sinusitis, unspecified (10/26/2016), Arthritis, Diverticulosis, Encounter for immunization (01/09/2020), Encounter for immunization (09/19/2016), Encounter for immunization (09/19/2016), Encounter for screening for other viral diseases (12/01/2017), Female genital prolapse, Hyperlipidemia, Hypertension, Hypothyroidism, Midline cystocele, OAB (overactive bladder), Osteopenia, Personal history of other specified conditions (04/23/2015), Raynaud's disease, Right carotid bruit, FRANCIS (stress urinary incontinence, female), Wears glasses, and Wears partial dentures.    Surgical History  She has a past surgical history that includes Tubal ligation (10/18/2013); Cataract extraction (06/11/2015); Hysterectomy (N/A, 09/16/2024); Urethral sling (N/A, 09/16/2024); and Sacrocolpopexy (N/A, 09/16/2024).     Social History  She reports that she has never smoked. She has never used smokeless tobacco. She reports that she does not drink alcohol and does not use drugs.    Family History  Family History   Problem Relation Name Age of Onset    Cervical cancer Mother      Prostate cancer Father      Diabetes type II Father      Aneurysm Sister          Ruptured Cerebral    Hyperlipidemia Brother      Diabetes type II Brother          Allergies  Lisinopril and Sulfa (sulfonamide antibiotics)      A comprehensive 10+ review of systems was negative except for: see hpi                          PHYSICAL EXAMINATION:  BP Readings from Last 3 Encounters:   11/18/24 132/64   10/28/24 107/62   10/21/24 152/60      Wt Readings from Last 3 Encounters:   11/18/24 72.1  "kg (159 lb)   10/28/24 72.6 kg (160 lb)   10/21/24 72.1 kg (159 lb)      BMI: Estimated body mass index is 30.04 kg/m² as calculated from the following:    Height as of 11/18/24: 1.549 m (5' 1\").    Weight as of 11/18/24: 72.1 kg (159 lb).  BSA: Estimated body surface area is 1.76 meters squared as calculated from the following:    Height as of 11/18/24: 1.549 m (5' 1\").    Weight as of 11/18/24: 72.1 kg (159 lb).  HEENT: Normocephalic, atraumatic, PER EOMI, nonicteric, trachea normal, thyroid normal, oropharynx normal.  CARDIAC: regular rate & rhythm, S1 & S2 normal.  No heaves, thrills, gallops or murmurs.  LUNGS: Clear to auscultation, no spinal or CV tenderness.  EXTREMITIES: No evidence of cyanosis, clubbing or edema.               Assessment:  Verenice Sotelo is a 69 y.o. who presents with stage II uterovaginal prolapse and mixed incontinence     POP:  -status post laparoscopic supracervical hysterectomy, sacrocolpopexy, sling 9/16/2024  -c/b bladder sling erosion, removed via retropubic approach by Dr. Silva 10/28        MADIE  -no FRANCIS  -has persistent urgency and occasional UUI   -Samples of gemtesa   -Referral to PFT      Follow up in 6 to 8 weeks        All questions and concerns were answered and addressed.  The patient expressed understanding and agrees with the plan.     Ruben Johnson MD    Scribe Attestation  By signing my name below, I, Mayuri Plata   attest that this documentation has been prepared under the direction and in the presence of Ruben Johnson MD.  "

## 2025-02-25 DIAGNOSIS — I10 BENIGN ESSENTIAL HYPERTENSION: ICD-10-CM

## 2025-02-25 RX ORDER — AMLODIPINE BESYLATE 5 MG/1
5 TABLET ORAL DAILY
Qty: 90 TABLET | Refills: 3 | Status: SHIPPED | OUTPATIENT
Start: 2025-02-25 | End: 2026-02-20

## 2025-03-12 DIAGNOSIS — R10.2 PELVIC PAIN IN FEMALE: ICD-10-CM

## 2025-03-12 DIAGNOSIS — M62.89 PELVIC FLOOR DYSFUNCTION: Primary | ICD-10-CM

## 2025-03-18 ENCOUNTER — EVALUATION (OUTPATIENT)
Dept: PHYSICAL THERAPY | Facility: CLINIC | Age: 70
End: 2025-03-18
Payer: MEDICARE

## 2025-03-18 DIAGNOSIS — M62.89 PELVIC FLOOR DYSFUNCTION: ICD-10-CM

## 2025-03-18 DIAGNOSIS — R10.2 PELVIC PAIN IN FEMALE: Primary | ICD-10-CM

## 2025-03-18 PROCEDURE — 97535 SELF CARE MNGMENT TRAINING: CPT | Mod: GP

## 2025-03-18 PROCEDURE — 97110 THERAPEUTIC EXERCISES: CPT | Mod: GP

## 2025-03-18 PROCEDURE — 97161 PT EVAL LOW COMPLEX 20 MIN: CPT | Mod: GP

## 2025-03-18 NOTE — PROGRESS NOTES
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 Pelvic pain in female  Referral to Physical Therapy    Follow Up In Physical Therapy      2. Pelvic floor dysfunction  Referral to Physical Therapy    Follow Up In Physical Therapy          Subjective   General:  Reason for Referral: Pelvic pain  Referred By: Dr. Johnson  Chief complaint: post operative   S/p POP laparoscopic supracervical hysterectomy, sacrocolpexy sling 2024 c/b sling erosion removed via retropubic approach October 10/28 s/p indwelling catheter x 1 month post operative.   She will have some internal pain when beginning to initiate urination.   Some leakage on the way to the bathroom if bladder is overfull.   Daytime voids: >15   Urgency with night time voids (2-3 times average).   Incomplete emptying - delayed 2nd urge after 5-15 minutes in the evenings   Leakage - minimal   Liner - 1 panty in liner throughout the day   Tried Gemtesa - did not like the side effects     No topical estrogen  Menopause - entered age 50s  3 vaginal deliveries     Pain:  Pain Assessment: 0-10  0-10 (Numeric) Pain Score: 0 - No pain    Objective   AROM:  Trunk and spine WFL in all planes   PROM/Joint Mobility:  B hip rotation hypomobility   Increased pain with passive hip extension   SLR > 90 degrees   Strength:  Hip abduction 4/5  Hip adduction 4/5  Special Tests:  ? TAHIRA  ? FADDIR   Palpation:  Tenderness over visceral  borders, mons pubis   Observation:   Decreased hip extension with gait   Pt tearful at times discussing previous experiences    *ortho exam limited by extensive interview*     Outcome Measure:   NIH CPSI pain 6 NIH CPSI urinary 7 NIH CPSI quality of life 6    Assessment:    Pt presenting to the clinic with complicated past medical history bladder mesh surgery with complications that required excision of mesh tissue and in dwelling catheterization for 1 month. Since that time, pt has had increased pelvic pain and dysfunction. There is increased fear and anxiety regarding her past medical trauma and a vaginal exam was deferred. Will perform when pt comfortable and consents. At this time, pt with increased bladder pain as well as increased voiding. Anticipate pt with vaginal hypertonicity causing altered pain and perception.     Plan:   PT Plan: Skilled PT  PT Frequency: 1 time per week  Duration: 12 weeks  Rehab Potential: Excellent  Plan of Care Agreement: Patient  Planned interventions include: biofeedback, cryotherapy, education/instruction, electrical stimulation, gait training, home program, hot pack, kinesiotaping, manual therapy, neuromuscular re-education, self care/home management, therapeutic activities, and therapeutic exercises.   Access Code: [email or web address]  URL: [email or web address]  Date: 03/18/2025  Prepared by: Elizabeth Duffy    Exercises  - Seated Figure 4 Piriformis Stretch  - 1 x daily - 7 x weekly - 2 sets - 5 reps - 20-30seconds  hold  - Sit to Stand with Resistance Around Legs  - 1 x daily - 7 x weekly - 2 sets - 10 reps - 5 seconds  hold  - Standing 4-Way Leg Reach with Counter Support  - 1 x daily - 7 x weekly - 2 sets - 10 reps - 5 seconds  hold  - Seated Hamstring Stretch  - 1 x daily - 7 x weekly - 2 sets - 5 reps - 20-30 seconds  hold    Careplan Goals:  1. Pt will be independent in HEP to maximize PT POC   2. Pt will be able to improve worst pain severity on NPRS by >2  points MCID   3. Pt will improve NIH CPSI by >50% raw score    Mariah Duffy, PT

## 2025-03-19 ASSESSMENT — PAIN - FUNCTIONAL ASSESSMENT: PAIN_FUNCTIONAL_ASSESSMENT: 0-10

## 2025-03-19 ASSESSMENT — ENCOUNTER SYMPTOMS
DEPRESSION: 0
LOSS OF SENSATION IN FEET: 0
OCCASIONAL FEELINGS OF UNSTEADINESS: 0

## 2025-03-19 ASSESSMENT — PATIENT HEALTH QUESTIONNAIRE - PHQ9
SUM OF ALL RESPONSES TO PHQ9 QUESTIONS 1 AND 2: 0
1. LITTLE INTEREST OR PLEASURE IN DOING THINGS: NOT AT ALL
2. FEELING DOWN, DEPRESSED OR HOPELESS: NOT AT ALL

## 2025-03-19 ASSESSMENT — PAIN SCALES - GENERAL: PAINLEVEL_OUTOF10: 0 - NO PAIN

## 2025-03-23 NOTE — PROGRESS NOTES
HISTORY OF PRESENT ILLNESS:  Verenice Sotelo is a 70 y.o. female who presents today for a follow up visit. She has had a slight headache and some vertigo with the medication. She has a history of a tubal. She does not recall any other surgical history in her pelvis that is not reported.           Past Medical History  She has a past medical history of Acute maxillary sinusitis, unspecified (10/26/2016), Arthritis, Diverticulosis, Encounter for immunization (01/09/2020), Encounter for immunization (09/19/2016), Encounter for immunization (09/19/2016), Encounter for screening for other viral diseases (12/01/2017), Female genital prolapse, Hyperlipidemia, Hypertension, Hypothyroidism, Midline cystocele, OAB (overactive bladder), Osteopenia, Personal history of other specified conditions (04/23/2015), Raynaud's disease, Right carotid bruit, FRANCIS (stress urinary incontinence, female), Wears glasses, and Wears partial dentures.    Surgical History  She has a past surgical history that includes Tubal ligation (10/18/2013); Cataract extraction (06/11/2015); Hysterectomy (N/A, 09/16/2024); Urethral sling (N/A, 09/16/2024); and Sacrocolpopexy (N/A, 09/16/2024).     Social History  She reports that she has never smoked. She has never used smokeless tobacco. She reports that she does not drink alcohol and does not use drugs.    Family History  Family History   Problem Relation Name Age of Onset    Cervical cancer Mother      Prostate cancer Father      Diabetes type II Father      Aneurysm Sister          Ruptured Cerebral    Hyperlipidemia Brother      Diabetes type II Brother          Allergies  Lisinopril and Sulfa (sulfonamide antibiotics)      A comprehensive 10+ review of systems was negative except for: see hpi                          PHYSICAL EXAMINATION:  BP Readings from Last 3 Encounters:   11/18/24 132/64   10/28/24 107/62   10/21/24 152/60      Wt Readings from Last 3 Encounters:   11/18/24 72.1 kg (159 lb)  "  10/28/24 72.6 kg (160 lb)   10/21/24 72.1 kg (159 lb)      BMI: Estimated body mass index is 30.04 kg/m² as calculated from the following:    Height as of 11/18/24: 1.549 m (5' 1\").    Weight as of 11/18/24: 72.1 kg (159 lb).  BSA: Estimated body surface area is 1.76 meters squared as calculated from the following:    Height as of 11/18/24: 1.549 m (5' 1\").    Weight as of 11/18/24: 72.1 kg (159 lb).  HEENT: Normocephalic, atraumatic, PER EOMI, nonicteric, trachea normal, thyroid normal, oropharynx normal.  CARDIAC: regular rate & rhythm, S1 & S2 normal.  No heaves, thrills, gallops or murmurs.  LUNGS: Clear to auscultation, no spinal or CV tenderness.  EXTREMITIES: No evidence of cyanosis, clubbing or edema.               Assessment:  Verenice Sotelo is a 70 y.o. who presents with stage II uterovaginal prolapse and mixed incontinence     POP:  -status post laparoscopic supracervical hysterectomy, sacrocolpopexy, sling 9/16/2024  -c/b bladder sling erosion, removed via retropubic approach by Dr. Silva 10/28        MADIE  -no FRANCIS  -has persistent urgency and occasional UUI   -had adverse rxn with gemtesa, vertigo  -Referral to PFT      Follow up in 4 months        All questions and concerns were answered and addressed.  The patient expressed understanding and agrees with the plan.     Ruben Johnson MD    Scribe Attestation  By signing my name below, I, Mayuri Plata   attest that this documentation has been prepared under the direction and in the presence of Ruben Johnson MD.  "

## 2025-03-24 ENCOUNTER — APPOINTMENT (OUTPATIENT)
Dept: UROLOGY | Facility: CLINIC | Age: 70
End: 2025-03-24
Payer: MEDICARE

## 2025-03-24 DIAGNOSIS — M62.89 PELVIC FLOOR DYSFUNCTION: Primary | ICD-10-CM

## 2025-03-24 DIAGNOSIS — N32.81 OAB (OVERACTIVE BLADDER): ICD-10-CM

## 2025-03-24 DIAGNOSIS — R10.2 PELVIC PAIN IN FEMALE: ICD-10-CM

## 2025-03-24 PROCEDURE — 99214 OFFICE O/P EST MOD 30 MIN: CPT | Performed by: STUDENT IN AN ORGANIZED HEALTH CARE EDUCATION/TRAINING PROGRAM

## 2025-03-25 ENCOUNTER — APPOINTMENT (OUTPATIENT)
Dept: PHYSICAL THERAPY | Facility: CLINIC | Age: 70
End: 2025-03-25
Payer: MEDICARE

## 2025-03-26 ENCOUNTER — TREATMENT (OUTPATIENT)
Dept: PHYSICAL THERAPY | Facility: CLINIC | Age: 70
End: 2025-03-26
Payer: MEDICARE

## 2025-03-26 DIAGNOSIS — R10.2 PELVIC PAIN IN FEMALE: ICD-10-CM

## 2025-03-26 DIAGNOSIS — M62.89 PELVIC FLOOR DYSFUNCTION: ICD-10-CM

## 2025-03-26 PROCEDURE — 97140 MANUAL THERAPY 1/> REGIONS: CPT | Mod: GP

## 2025-03-26 PROCEDURE — 97535 SELF CARE MNGMENT TRAINING: CPT | Mod: GP

## 2025-03-26 NOTE — PROGRESS NOTES
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 Johnson     Therapy diagnoses:   1. Pelvic floor dysfunction  Follow Up In Physical Therapy      2. Pelvic pain in female  Follow Up In Physical Therapy           Subjective:  Noticing a little less intensity of bladder pain over the past week. Pain is more present in the morning prior to a bowel movement. Compliant with massage. Frequency is also improving.   Biggest goal is confidence and holding urine   Pain (0-10): mild discomfort, <3/10    Objective:    Treatment Performed:   Therapeutic Exercise:    minutes    Therapeutic Activity:   minutes     Self Care: 10 minutes  Discuss POC, goals of progress   Manual Therapy: 36 minutes  External and internal assessment explained. Verbal consent obtained to proceed with vaginal or rectal exam and consented for the treatment approaches today. Patient understands they have power and right to stop examination at any time. A chaperone was offered to patient and patient declined.    Vaginal Observation:  Voluntary Contraction: + initially with significant glute atrophy   Voluntary Relaxation: +   Involuntary Contraction: +   Involuntary Relaxation:  +  Labial resorption  Anterior descent noted, minimal change with curl up   Sling incision with mild build up, no paresthesias or skin changes   Mild tenderness to lateral R > L lateral bladder wall   Appropriate clitoral mobility      External vaginal palpation: no trigger points  1 o'clock (ischiocavernosus)  2 o'clock (bulbocavernosus)  3 o'clock (superficial transverse perineal)  4 o'clock (pubococcygeus)  5 o'clock (iliococcygeus)  6 o'clock (coccyx)  7 o'clock (iliococcygeus)  8 o'clock (pubococcygeus)  9 o'clock (superficial transverse perineal)  10 o'clock (bulbocavernosus)  11 o'clock (ischiocavernosus)  12 o'clock (pubic symphysis inferior angle)  Obturator:  Piriformis:  Hamstring:  Adductor:    Internal vaginal palpation: no trigger points   1 o'clock (ischiocavernosus)  2 o'clock (bulbocavernosus)  3 o'clock (superficial transverse perineal)  4 o'clock (pubococcygeus)  5 o'clock (iliococcygeus)  6 o'clock (coccyx)  7 o'clock (iliococcygeus)  8 o'clock (pubococcygeus)  9 o'clock (superficial transverse perineal)  10 o'clock (bulbocavernosus)  11 o'clock (ischiocavernosus)  12 o'clock (pubic symphysis inferior angle)  Obturator:  Piriformis:      Pelvic Floor MMT Grade  0/zero: no palpable contraction/squeeze  1/trace: flicker of squeeze or contraction  2/poor: squeeze pressure asymmetrical or felt at various points- no lift or displacement  3/fair: squeeze pressure (contraction) and lift or displacement  4/good: squeeze pressure (contraction) and lift or displacement from anterior, posterior, and side walls  5/strong: full circumference of finger compressed, displaced with an inward pull    [place] PERF(Power/Endurance/Repetitions/Fast Twitch)  */*/*/*    Neuromuscular Re-education:   minutes    Gait Training:    minutes    Modalities:    minutes    Dry Needling:   minutes    Assessment:    Pt with near normal values of pelvic floor performance. Pt with abnormal bladder habits affecting main symptom. Improved functional status and pain level.  Pain levels were unchanged at the end of the treatment.    Plan:  Will see in 2-3 weeks

## 2025-04-09 DIAGNOSIS — E03.9 HYPOTHYROIDISM, UNSPECIFIED TYPE: ICD-10-CM

## 2025-04-09 RX ORDER — LEVOTHYROXINE SODIUM 100 UG/1
100 TABLET ORAL DAILY
Qty: 90 TABLET | Refills: 3 | Status: SHIPPED | OUTPATIENT
Start: 2025-04-09

## 2025-04-22 ENCOUNTER — TREATMENT (OUTPATIENT)
Dept: PHYSICAL THERAPY | Facility: CLINIC | Age: 70
End: 2025-04-22
Payer: MEDICARE

## 2025-04-22 DIAGNOSIS — R10.2 PELVIC PAIN IN FEMALE: ICD-10-CM

## 2025-04-22 DIAGNOSIS — M62.89 PELVIC FLOOR DYSFUNCTION: ICD-10-CM

## 2025-04-22 PROCEDURE — 97535 SELF CARE MNGMENT TRAINING: CPT | Mod: GP

## 2025-04-22 NOTE — PROGRESS NOTES
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 Johnson     Therapy diagnoses:   1. Pelvic floor dysfunction  Follow Up In Physical Therapy      2. Pelvic pain in female  Follow Up In Physical Therapy             Subjective:  Pain is overall a little improved, now she is thinking it may not actually be bladder pain but actually bowel pain. One episode of leakage when holding bladder a little too long. She had a moderate amount of leakage.   Biggest goal is confidence and holding urine   Pain (0-10): mild discomfort, <3/10    Objective:    Treatment Performed:   Therapeutic Exercise:    minutes    Therapeutic Activity:   minutes     Self Care: 38 minutes  Discuss POC, goals of progress, exercise program, indications for return, red flags   Manual Therapy:   minutes    Neuromuscular Re-education:   minutes    Gait Training:    minutes    Modalities:    minutes    Dry Needling:   minutes    Assessment:    Pt has made significant strides with PT. She is having better control of her urine. Leakage only occurred situationally with overfull bladder. Her pain has also decreased significantly.   Pain levels were unchanged at the end of the treatment.    Plan:  All patient's questions were answered and will discharge and follow up if needed.

## 2025-04-28 ENCOUNTER — APPOINTMENT (OUTPATIENT)
Dept: PRIMARY CARE | Facility: CLINIC | Age: 70
End: 2025-04-28
Payer: COMMERCIAL

## 2025-04-28 VITALS
BODY MASS INDEX: 31.02 KG/M2 | SYSTOLIC BLOOD PRESSURE: 124 MMHG | DIASTOLIC BLOOD PRESSURE: 72 MMHG | HEART RATE: 54 BPM | WEIGHT: 158 LBS | OXYGEN SATURATION: 99 % | HEIGHT: 60 IN

## 2025-04-28 DIAGNOSIS — I10 BENIGN ESSENTIAL HYPERTENSION: ICD-10-CM

## 2025-04-28 DIAGNOSIS — Z86.39 H/O: OBESITY: ICD-10-CM

## 2025-04-28 DIAGNOSIS — R55 PRE-SYNCOPE: ICD-10-CM

## 2025-04-28 DIAGNOSIS — Z00.00 ROUTINE GENERAL MEDICAL EXAMINATION AT HEALTH CARE FACILITY: Primary | ICD-10-CM

## 2025-04-28 DIAGNOSIS — M85.80 OSTEOPENIA, UNSPECIFIED LOCATION: ICD-10-CM

## 2025-04-28 DIAGNOSIS — Z78.0 POST-MENOPAUSAL: ICD-10-CM

## 2025-04-28 DIAGNOSIS — E03.9 HYPOTHYROIDISM, UNSPECIFIED TYPE: ICD-10-CM

## 2025-04-28 DIAGNOSIS — Z12.31 ENCOUNTER FOR SCREENING MAMMOGRAM FOR MALIGNANT NEOPLASM OF BREAST: ICD-10-CM

## 2025-04-28 DIAGNOSIS — F32.A DEPRESSION, UNSPECIFIED DEPRESSION TYPE: ICD-10-CM

## 2025-04-28 PROCEDURE — 99397 PER PM REEVAL EST PAT 65+ YR: CPT

## 2025-04-28 PROCEDURE — 3074F SYST BP LT 130 MM HG: CPT

## 2025-04-28 PROCEDURE — 1159F MED LIST DOCD IN RCRD: CPT

## 2025-04-28 PROCEDURE — 1036F TOBACCO NON-USER: CPT

## 2025-04-28 PROCEDURE — 3078F DIAST BP <80 MM HG: CPT

## 2025-04-28 PROCEDURE — 1160F RVW MEDS BY RX/DR IN RCRD: CPT

## 2025-04-28 PROCEDURE — 99214 OFFICE O/P EST MOD 30 MIN: CPT

## 2025-04-28 PROCEDURE — G0439 PPPS, SUBSEQ VISIT: HCPCS

## 2025-04-28 PROCEDURE — 3008F BODY MASS INDEX DOCD: CPT

## 2025-04-28 PROCEDURE — 1170F FXNL STATUS ASSESSED: CPT

## 2025-04-28 RX ORDER — HYDROXYZINE HYDROCHLORIDE 25 MG/1
25 TABLET, FILM COATED ORAL EVERY 8 HOURS PRN
Qty: 90 TABLET | Refills: 0 | Status: SHIPPED | OUTPATIENT
Start: 2025-04-28 | End: 2025-05-28

## 2025-04-28 RX ORDER — SERTRALINE HYDROCHLORIDE 50 MG/1
50 TABLET, FILM COATED ORAL DAILY
Qty: 30 TABLET | Refills: 5 | Status: SHIPPED | OUTPATIENT
Start: 2025-04-28 | End: 2025-04-30 | Stop reason: SDUPTHER

## 2025-04-28 ASSESSMENT — ACTIVITIES OF DAILY LIVING (ADL)
TAKING_MEDICATION: INDEPENDENT
GROCERY_SHOPPING: INDEPENDENT
DOING_HOUSEWORK: INDEPENDENT
DRESSING: INDEPENDENT
BATHING: INDEPENDENT
MANAGING_FINANCES: INDEPENDENT

## 2025-04-28 NOTE — PROGRESS NOTES
"Subjective   Reason for Visit: Verenice Sotelo is an 70 y.o. female here for a Medicare Wellness visit.     HPI    Chronic issues:  - HTN- amlodipine--- previously on losartan, hctz.. switched to amlodopine to help with raynauds    - hypothyroidism-  levo 100  - HLD- lipitor  - pelvic prolapse- s/p repair with Dr Johnson. Follows w him. Still running to the bathroom all the time. Feeling so upset about how things have been going with this.  - osteopenia- vit d, calcium  - R carotid bruit- last carotid US normal 2022  - Raynauds- + NEWTON, negative breakdown. Norvasc in fall.     New concerns:  - having a lot of stress, very overwhelmed, feels like she is spiraling. Takes care of her mom and a lot of caretaking falls on her. Has talked with . No SI/HI. Having trouble sleeping 1-2x per week. Feels like her memory is affected.   - occasionally getting feeling like she is going to pass out. Head feels off. Can stop and take a breath and it may improve in 10-15 seconds. Not even happening once weekly usually. First time it happened was back in august 2024. No cp, sob. Sometimes gets a feeling like a flipping in her chest.     Health Maintenance Reminder:  -Medicare: today  -Preventative: today   -Blood Work: today  -Hep C: complete  -Mammogram: 5/25  -Colonoscopy: 10/31  -Pap: >65y  -DEXA (FRAX): 5/25  -Shringix : completed   -Pneumovax: complete  -Prevnar: wants to do next time   -Flu: Yearly       Mood:  Sleep:  Appetite/diet:  Exercise:  Drugs, alcohol, tobacco:        Patient Care Team:  Caprice Taylor PA-C as PCP - General (Family Medicine)   Ruben Johnson- Urology     Review of Systems    Objective   Vitals:  /72   Pulse 54   Ht 1.53 m (5' 0.24\")   Wt 71.7 kg (158 lb)   SpO2 99%   BMI 30.62 kg/m²       Physical Exam  Constitutional:       Appearance: Normal appearance.   HENT:      Head: Normocephalic and atraumatic.   Eyes:      Extraocular Movements: Extraocular movements intact.      Pupils: " Pupils are equal, round, and reactive to light.   Neck:      Vascular: Carotid bruit present.   Cardiovascular:      Rate and Rhythm: Normal rate and regular rhythm.      Pulses: Normal pulses.      Heart sounds: Normal heart sounds.   Pulmonary:      Effort: Pulmonary effort is normal.      Breath sounds: Normal breath sounds.   Musculoskeletal:         General: Normal range of motion.      Right lower leg: No edema.      Left lower leg: No edema.   Skin:     General: Skin is warm and dry.      Findings: No rash.   Neurological:      General: No focal deficit present.      Mental Status: She is alert and oriented to person, place, and time.   Psychiatric:         Mood and Affect: Mood normal.         Behavior: Behavior normal.         Assessment & Plan  Routine general medical examination at health care facility    Orders:    1 Year Follow Up In Primary Care - Wellness Exam; Future    Benign essential hypertension  Doing well on amlodipine, well controlled, continue   Orders:    Lipid Panel    Comprehensive Metabolic Panel    CBC and Auto Differential    Hypothyroidism, unspecified type  On levo 100, recheck tsh today   Orders:    TSH with reflex to Free T4 if abnormal    Encounter for screening mammogram for malignant neoplasm of breast  Due for mammo in may , ordered  Orders:    BI mammo bilateral screening tomosynthesis; Future    Osteopenia, unspecified location  Continue calcium and vit d  Ordered dexa  Orders:    XR DEXA bone density; Future    Post-menopausal  Due for dexa in may, ordered  Orders:    XR DEXA bone density; Future    Pre-syncope  Having episodes of feeling like she is going to pass out, sometimes less than weekly, sometimes a few times per day  Does have a significant carotid bruit, last carotid US 2022  Check holter, echo, carotid US, iron, tsh   Orders:    Holter or Event Cardiac Monitor; Future    Vascular US Carotid Artery Duplex Bilateral; Future    Iron and TIBC    Transthoracic Echo  (TTE) Complete; Future    Depression, unspecified depression type  Pt having overwhelming stress, depression, anxiety lately. Has a lot on her plate. Previously did ok on wellbutrin but much more anxiety now than in the past  - start zoloft 50 -> 100 as tolerated. Discussed how the medication works, side effects, and educated pt it may take 4-6 weeks to see full effects of medication and  how well it is working. Educated to go to the ER with any SI/HI/self harm.   - hydroxyzine prn   - counseling  Follow up in 6 weeks.  Orders:    sertraline (Zoloft) 50 mg tablet; Take 1 tablet (50 mg) by mouth once daily.    hydrOXYzine HCL (Atarax) 25 mg tablet; Take 1 tablet (25 mg) by mouth every 8 hours if needed for anxiety.    H/O: obesity  Brittle nails , check iron and tsh   Orders:    Iron and TIBC           Depression Screening  more than 15 minutes were spent screening for depression.

## 2025-04-28 NOTE — ASSESSMENT & PLAN NOTE
Doing well on amlodipine, well controlled, continue   Orders:    Lipid Panel    Comprehensive Metabolic Panel    CBC and Auto Differential

## 2025-04-28 NOTE — PATIENT INSTRUCTIONS
Start zoloft 50 mg. If after a few weeks you are tolerating it ok, you can increase to 100 mg. It can take 4-6 weeks to really see the full effects of an SSRI. You can use 1/2 or 1 whole tablet of hydroxyzine as needed for anxiety. Take it at home the first time so you know how you feel.     I will call with lab results    [phone]- family pride for your mom  Consider speaking with Dr Burroughs about the department of aging day program for adults         Schedule the holter ,echo, and the carotid US         Counseling Services   (Many places are doing virtual appointments with patients)     Suicide Hotline: (785) 139-2370    [facility]   [phone]  Variety of locations  [email or web address]    [facility]  [phone] [address]  [place] [address]    [facility]   [phone]    [facility]   [phone]   [address] [place] [address]  [email or web address]    [place]     [phone]  [address] [place]   [address]  [facility]  [phone] [address]  [place] [address]  [email or web address]        [facility]  [phone]  [address]  [place] [address]    [facility]   General number: [phone]  [email or web address]  *no kids*     [facility]  [phone] [address] [place] [address]  *no kids*     [facility]  [phone] [address] [place] [address]  *no kids*     [facility]  [phone] [address]  [place] [address]    [facility]: [facility]   [phone]  [facility]  [address], [place], [address]  Walk in hours: Mon - Fri 1:00 PM - 5:00 PM    [facility]: [facility]   [phone] <[phone]>)  [address] [place], [place],  [address]  Walk in hours: Mon - Fri  11:00 AM - 1:30 PM

## 2025-04-29 LAB
ALBUMIN SERPL-MCNC: 4.5 G/DL (ref 3.6–5.1)
ALP SERPL-CCNC: 74 U/L (ref 37–153)
ALT SERPL-CCNC: 27 U/L (ref 6–29)
ANION GAP SERPL CALCULATED.4IONS-SCNC: 8 MMOL/L (CALC) (ref 7–17)
AST SERPL-CCNC: 32 U/L (ref 10–35)
BASOPHILS # BLD AUTO: 40 CELLS/UL (ref 0–200)
BASOPHILS NFR BLD AUTO: 0.8 %
BILIRUB SERPL-MCNC: 0.8 MG/DL (ref 0.2–1.2)
BUN SERPL-MCNC: 14 MG/DL (ref 7–25)
CALCIUM SERPL-MCNC: 9.4 MG/DL (ref 8.6–10.4)
CHLORIDE SERPL-SCNC: 106 MMOL/L (ref 98–110)
CHOLEST SERPL-MCNC: 130 MG/DL
CHOLEST/HDLC SERPL: 2.9 (CALC)
CO2 SERPL-SCNC: 27 MMOL/L (ref 20–32)
CREAT SERPL-MCNC: 0.77 MG/DL (ref 0.6–1)
EGFRCR SERPLBLD CKD-EPI 2021: 82 ML/MIN/1.73M2
EOSINOPHIL # BLD AUTO: 110 CELLS/UL (ref 15–500)
EOSINOPHIL NFR BLD AUTO: 2.2 %
ERYTHROCYTE [DISTWIDTH] IN BLOOD BY AUTOMATED COUNT: 13 % (ref 11–15)
GLUCOSE SERPL-MCNC: 78 MG/DL (ref 65–99)
HCT VFR BLD AUTO: 41.1 % (ref 35–45)
HDLC SERPL-MCNC: 45 MG/DL
HGB BLD-MCNC: 13.5 G/DL (ref 11.7–15.5)
LDLC SERPL CALC-MCNC: 72 MG/DL (CALC)
LYMPHOCYTES # BLD AUTO: 1890 CELLS/UL (ref 850–3900)
LYMPHOCYTES NFR BLD AUTO: 37.8 %
MCH RBC QN AUTO: 30.4 PG (ref 27–33)
MCHC RBC AUTO-ENTMCNC: 32.8 G/DL (ref 32–36)
MCV RBC AUTO: 92.6 FL (ref 80–100)
MONOCYTES # BLD AUTO: 410 CELLS/UL (ref 200–950)
MONOCYTES NFR BLD AUTO: 8.2 %
NEUTROPHILS # BLD AUTO: 2550 CELLS/UL (ref 1500–7800)
NEUTROPHILS NFR BLD AUTO: 51 %
NONHDLC SERPL-MCNC: 85 MG/DL (CALC)
PLATELET # BLD AUTO: 232 THOUSAND/UL (ref 140–400)
PMV BLD REES-ECKER: 10.1 FL (ref 7.5–12.5)
POTASSIUM SERPL-SCNC: 4.1 MMOL/L (ref 3.5–5.3)
PROT SERPL-MCNC: 7 G/DL (ref 6.1–8.1)
RBC # BLD AUTO: 4.44 MILLION/UL (ref 3.8–5.1)
SODIUM SERPL-SCNC: 141 MMOL/L (ref 135–146)
TRIGL SERPL-MCNC: 54 MG/DL
TSH SERPL-ACNC: 0.85 MIU/L (ref 0.4–4.5)
WBC # BLD AUTO: 5 THOUSAND/UL (ref 3.8–10.8)

## 2025-04-30 DIAGNOSIS — F32.A DEPRESSION, UNSPECIFIED DEPRESSION TYPE: ICD-10-CM

## 2025-04-30 RX ORDER — SERTRALINE HYDROCHLORIDE 50 MG/1
50 TABLET, FILM COATED ORAL DAILY
Qty: 90 TABLET | Refills: 3 | Status: SHIPPED | OUTPATIENT
Start: 2025-04-30 | End: 2026-04-25

## 2025-05-06 ENCOUNTER — TELEPHONE (OUTPATIENT)
Dept: PRIMARY CARE | Facility: CLINIC | Age: 70
End: 2025-05-06
Payer: MEDICARE

## 2025-05-06 DIAGNOSIS — F32.A DEPRESSION, UNSPECIFIED DEPRESSION TYPE: Primary | ICD-10-CM

## 2025-05-06 RX ORDER — BUPROPION HYDROCHLORIDE 150 MG/1
150 TABLET ORAL EVERY MORNING
Qty: 30 TABLET | Refills: 1 | Status: SHIPPED | OUTPATIENT
Start: 2025-05-06 | End: 2025-05-09 | Stop reason: SDUPTHER

## 2025-05-06 NOTE — TELEPHONE ENCOUNTER
Started zoloft on 4/28. Is too tired, no enery or motivation to do do anything. Wants to stop it but wants to know if she should just stop or slowly stop.

## 2025-05-09 DIAGNOSIS — F32.A DEPRESSION, UNSPECIFIED DEPRESSION TYPE: ICD-10-CM

## 2025-05-09 RX ORDER — BUPROPION HYDROCHLORIDE 150 MG/1
150 TABLET ORAL EVERY MORNING
Qty: 90 TABLET | Refills: 1 | Status: SHIPPED | OUTPATIENT
Start: 2025-05-09 | End: 2025-11-05

## 2025-05-21 DIAGNOSIS — F32.A DEPRESSION, UNSPECIFIED DEPRESSION TYPE: ICD-10-CM

## 2025-05-21 RX ORDER — HYDROXYZINE HYDROCHLORIDE 25 MG/1
25 TABLET, FILM COATED ORAL EVERY 8 HOURS PRN
Qty: 270 TABLET | Refills: 1 | Status: SHIPPED | OUTPATIENT
Start: 2025-05-21

## 2025-05-27 ENCOUNTER — HOSPITAL ENCOUNTER (OUTPATIENT)
Dept: CARDIOLOGY | Facility: HOSPITAL | Age: 70
Discharge: HOME | End: 2025-05-27
Payer: MEDICARE

## 2025-05-27 ENCOUNTER — HOSPITAL ENCOUNTER (OUTPATIENT)
Dept: VASCULAR MEDICINE | Facility: HOSPITAL | Age: 70
Discharge: HOME | End: 2025-05-27
Payer: MEDICARE

## 2025-05-27 DIAGNOSIS — R55 PRE-SYNCOPE: ICD-10-CM

## 2025-05-27 LAB
AORTIC VALVE MEAN GRADIENT: 4 MMHG
AORTIC VALVE PEAK VELOCITY: 1.57 M/S
AV PEAK GRADIENT: 10 MMHG
AVA (PEAK VEL): 2.64 CM2
AVA (VTI): 2.77 CM2
EJECTION FRACTION APICAL 4 CHAMBER: 66.7
EJECTION FRACTION: 63 %
LEFT ATRIUM VOLUME AREA LENGTH INDEX BSA: 22 ML/M2
LEFT VENTRICLE INTERNAL DIMENSION DIASTOLE: 4.5 CM (ref 3.5–6)
LEFT VENTRICULAR OUTFLOW TRACT DIAMETER: 2 CM
MITRAL VALVE E/A RATIO: 0.89
RIGHT VENTRICLE FREE WALL PEAK S': 9.9 CM/S
RIGHT VENTRICLE PEAK SYSTOLIC PRESSURE: 24.3 MMHG
TRICUSPID ANNULAR PLANE SYSTOLIC EXCURSION: 2.6 CM

## 2025-05-27 PROCEDURE — 93306 TTE W/DOPPLER COMPLETE: CPT | Performed by: STUDENT IN AN ORGANIZED HEALTH CARE EDUCATION/TRAINING PROGRAM

## 2025-05-27 PROCEDURE — 93306 TTE W/DOPPLER COMPLETE: CPT

## 2025-05-27 PROCEDURE — 93880 EXTRACRANIAL BILAT STUDY: CPT

## 2025-05-27 PROCEDURE — 93246 EXT ECG>7D<15D RECORDING: CPT

## 2025-05-27 PROCEDURE — 93880 EXTRACRANIAL BILAT STUDY: CPT | Performed by: SURGERY

## 2025-06-04 DIAGNOSIS — R09.89 BRUIT OF RIGHT CAROTID ARTERY: Primary | ICD-10-CM

## 2025-06-09 ENCOUNTER — APPOINTMENT (OUTPATIENT)
Dept: PRIMARY CARE | Facility: CLINIC | Age: 70
End: 2025-06-09
Payer: MEDICARE

## 2025-07-05 DIAGNOSIS — E78.2 MIXED HYPERLIPIDEMIA: ICD-10-CM

## 2025-07-07 RX ORDER — ATORVASTATIN CALCIUM 40 MG/1
40 TABLET, FILM COATED ORAL DAILY
Qty: 90 TABLET | Refills: 1 | Status: SHIPPED | OUTPATIENT
Start: 2025-07-07

## 2025-07-10 DIAGNOSIS — R94.31 ABNORMAL HOLTER MONITOR FINDING: Primary | ICD-10-CM

## 2025-07-14 ENCOUNTER — APPOINTMENT (OUTPATIENT)
Dept: UROLOGY | Facility: CLINIC | Age: 70
End: 2025-07-14
Payer: MEDICARE

## 2025-07-16 DIAGNOSIS — M17.11 ARTHRITIS OF KNEE, RIGHT: ICD-10-CM

## 2025-07-17 RX ORDER — MELOXICAM 7.5 MG/1
7.5 TABLET ORAL DAILY
Qty: 90 TABLET | Refills: 1 | Status: SHIPPED | OUTPATIENT
Start: 2025-07-17

## 2025-07-23 ENCOUNTER — APPOINTMENT (OUTPATIENT)
Dept: VASCULAR SURGERY | Facility: HOSPITAL | Age: 70
End: 2025-07-23
Payer: MEDICARE

## 2025-08-12 DIAGNOSIS — R94.31 ABNORMAL HOLTER EXAM: Primary | ICD-10-CM

## 2025-08-18 ENCOUNTER — HOSPITAL ENCOUNTER (OUTPATIENT)
Dept: RADIOLOGY | Facility: HOSPITAL | Age: 70
Discharge: HOME | End: 2025-08-18
Payer: MEDICARE

## 2025-08-18 ENCOUNTER — APPOINTMENT (OUTPATIENT)
Dept: CARDIOLOGY | Facility: HOSPITAL | Age: 70
End: 2025-08-18
Payer: MEDICARE

## 2025-08-18 ENCOUNTER — APPOINTMENT (OUTPATIENT)
Dept: RADIOLOGY | Facility: HOSPITAL | Age: 70
End: 2025-08-18
Payer: MEDICARE

## 2025-08-18 VITALS — HEIGHT: 60 IN | BODY MASS INDEX: 31.02 KG/M2 | WEIGHT: 158 LBS

## 2025-08-18 DIAGNOSIS — Z78.0 POST-MENOPAUSAL: ICD-10-CM

## 2025-08-18 DIAGNOSIS — Z12.31 ENCOUNTER FOR SCREENING MAMMOGRAM FOR MALIGNANT NEOPLASM OF BREAST: ICD-10-CM

## 2025-08-18 DIAGNOSIS — M85.80 OSTEOPENIA, UNSPECIFIED LOCATION: ICD-10-CM

## 2025-08-18 PROCEDURE — 77080 DXA BONE DENSITY AXIAL: CPT

## 2025-08-18 PROCEDURE — 77080 DXA BONE DENSITY AXIAL: CPT | Performed by: RADIOLOGY

## 2025-08-18 PROCEDURE — 77067 SCR MAMMO BI INCL CAD: CPT | Performed by: RADIOLOGY

## 2025-08-18 PROCEDURE — 77067 SCR MAMMO BI INCL CAD: CPT

## 2025-08-18 PROCEDURE — 77063 BREAST TOMOSYNTHESIS BI: CPT | Performed by: RADIOLOGY

## 2025-10-20 ENCOUNTER — APPOINTMENT (OUTPATIENT)
Dept: PRIMARY CARE | Facility: CLINIC | Age: 70
End: 2025-10-20
Payer: MEDICARE

## (undated) DEVICE — PAD, SANITARY, OBSTETRICAL, W/ADHESIVE STRIP, WING, 11 IN, NS

## (undated) DEVICE — TUBE SET, PNEUMOLAR HEATED, SMOKE EVACU, HIGH-FLOW

## (undated) DEVICE — COUNTER, NEEDLE, FOAM STRIP, DOUBLE, W/BLADEGUARD, 30 COUNT

## (undated) DEVICE — SUTURE, PDS, 0, 18 IN, LIGATING LOOP, VIOLET

## (undated) DEVICE — TROCAR, OPTICAL BLADELESS 5MM X 100 W/ADVANCED FIXATION

## (undated) DEVICE — SUTURE, SILK, 0, 30 IN, FSL, BLACK

## (undated) DEVICE — DISSECTOR, KITTNER, PEANUT, 5MM

## (undated) DEVICE — GOWN, LARGE, IMPERVIOUS SLEEVES

## (undated) DEVICE — DRAPE PACK, MINOR, CUSTOM, GEAUGA

## (undated) DEVICE — DRAPE PACK, LAVH, W/ATTACHED LEGGINGS, W/POUCH, 100 X 114 IN, LF, STERILE

## (undated) DEVICE — EVACUATOR, WOUND, SUCTION, CLOSED, JACKSON-PRATT, 100 CC, SILICONE

## (undated) DEVICE — STOPCOCK, SAN ANTONIO, W/MODIFIED FITTING

## (undated) DEVICE — COVER, TABLE, 44X90

## (undated) DEVICE — SUTURE, PDS II, 4-0, 27 IN, RB-1 VIL MONO, LF

## (undated) DEVICE — DRAIN, PENROSE, 0.25 X 12 IN

## (undated) DEVICE — TIP, SUCTION, POOLEHANDPIECE, POOLE SUCTION, W/VENT, 14-28FR

## (undated) DEVICE — ADHESIVE, SKIN, MASTISOL, 2/3 CC VIAL

## (undated) DEVICE — SUTURE, ETHIBOND, XTRA, 30 IN, 0, CT-2, GREEN

## (undated) DEVICE — Device

## (undated) DEVICE — SUTURE, VICRYL, 2-0, 27 IN, UR-6, VIOLET

## (undated) DEVICE — CAUTERY, PENCIL, PUSH BUTTON, SMOKE EVAC, 70MM

## (undated) DEVICE — DRAIN, JP CHANNEL, 15 FR, RND, W/TROCAR

## (undated) DEVICE — SOLUTION, IRRIGATION, SODIUM CHLORIDE 0.9%, 1000 ML, POUR BOTTLE

## (undated) DEVICE — DRESSING, GAUZE, DRAIN SPONGE, 6 PLY, EXCILON, 4 X 4 IN, STERILE

## (undated) DEVICE — PREP TRAY, SKIN, DRY, W/GLOVES

## (undated) DEVICE — DRAPE, INSTRUMENT, W/POUCH, STERI DRAPE, 9 5/8 X 18 LONG

## (undated) DEVICE — NEEDLE, SAFETY, 22 G X 1.5 IN

## (undated) DEVICE — GRASPER TIP, FENESTRATED, DISP

## (undated) DEVICE — POSITIONING, THE PINK PAD, PIGAZZI SYSTEM

## (undated) DEVICE — TOWEL PACK, STERILE, 4/PACK, BLUE

## (undated) DEVICE — SUTURE, VICRYL, 0, 27 IN, CT-2, UNDYED

## (undated) DEVICE — COVER HANDLE LIGHT, STERIS, BLUE, STERILE

## (undated) DEVICE — STAY SET, SURGICAL , 5MM SHARP HOOK, CS/ 50

## (undated) DEVICE — SUTURE, VICRYL, 0, 18 IN, CT-1, UNDYED

## (undated) DEVICE — ELECTRODE, ELECTROSURGICAL, LAPAROSCOPIC, L HOOK TIP, 36 IN

## (undated) DEVICE — SUTURE, VICRYL, 2-0, 27 IN, SH, UNDYED

## (undated) DEVICE — TIP, SUCTION, YANKAUER, FLEXIBLE

## (undated) DEVICE — SUTURE, SILK, 3-0, 30 IN, SH, CONTROL RELEASE, MULTIPACK, BLACK

## (undated) DEVICE — NEEDLE, SPINAL, 20 G X 3.5 IN, YELLOW HUB

## (undated) DEVICE — TROCAR SYSTEM, BALLOON, KII GELPORT, 12 X 100MM

## (undated) DEVICE — APPLICATOR, CHLORAPREP, W/ORANGE TINT, 26ML

## (undated) DEVICE — TRAY, FOLEY, ADVANCE, 16FR, SILICONE, W/STATLOCK

## (undated) DEVICE — ADHESIVE, SKIN, DERMABOND ADVANCED, 15CM, PEN-STYLE

## (undated) DEVICE — GLOVE, SURGICAL, PROTEXIS PI MICRO, 7.5, PF, LF

## (undated) DEVICE — DRAPE, LEGGINGS, 48 X 31 IN, STERILE, LF

## (undated) DEVICE — DRESSING, ADHESIVE, ISLAND, TELFA, 4 X 14 IN

## (undated) DEVICE — IRRIGATION SET, CYSTOSCOPY, TURP, Y, CONTINUOUS, 81 IN

## (undated) DEVICE — TRAY, SURESTEP, SILICONE DRAINAGE BAG, STATLOCK, 16FR

## (undated) DEVICE — SLING, DESARA, BLUE TV, WITH 2.7 INTRODUCER

## (undated) DEVICE — SUTURE, MONOCRYL, 3-0, 27 IN, SH, UNDYED

## (undated) DEVICE — SOLUTION, IRRIGATION, STERILE WATER, 1000 ML, POUR BOTTLE

## (undated) DEVICE — COVER, TABLE, 54X90

## (undated) DEVICE — SUTURE, PDSII, 1, TP-1, VIL, MONO, 48LP

## (undated) DEVICE — CATHETER, URETHRAL, ALL PURPOSE, 16FR

## (undated) DEVICE — DRAPE, PAD, INSTRUMENT, MAGNETIC, MEDIUM, 10 X 16 IN, DISPOSABLE

## (undated) DEVICE — CATHETER, FOLEY, ELASTOMER, 2 WAY, 16 FR 5 CC, SILICONE

## (undated) DEVICE — SUTURE, VICRYL, 4-0, 18 IN, PS2, UNDYED

## (undated) DEVICE — CARE KIT, LAPAROSCOPIC, ADVANCED

## (undated) DEVICE — SYRINGE, 60 CC, LUER LOCK, MONOJECT, W/O CAP, LF

## (undated) DEVICE — SUTURE, STRATAFIX, SPIRAL, 2-0 SH, PDS PLUS VIOLET

## (undated) DEVICE — SUTURE, VICRYL, 0, 18 IN, TIE, VIOLET

## (undated) DEVICE — ACCESS SYS, KII SHIELDED BLADED, Z-THREAD, 12X100CM

## (undated) DEVICE — ASSEMBLY, STRYKER FLOW 2, SUCTION IRRIGATOR, WITH TIP

## (undated) DEVICE — SOLUTION, INJECTION, USP, SODIUM CHLORIDE 0.9%, .9, NACL, 1000 ML, BAG

## (undated) DEVICE — DEVICE, VOYANT, MARYLAND, 5MM X 37CM

## (undated) DEVICE — ELECTRODE, OPTI2 LAPAROSCOPIC SPATULA, CURVED

## (undated) DEVICE — RETRACTOR, CERVICAL CUP, VCARE, STANDARD

## (undated) DEVICE — SUTURE, PDS II, 0, 27 IN, CT-2, VIOLET

## (undated) DEVICE — OSTOMY KIT, POSTOP, COLOSTOMY, 2 PIECE, 2 3/4 INCH

## (undated) DEVICE — SCISSOR, MINI ENDO CUT, TIPS, DISP

## (undated) DEVICE — SUTURE, VICRYL, 3-0, 27 IN, SH